# Patient Record
Sex: FEMALE | Race: WHITE | Employment: OTHER | URBAN - METROPOLITAN AREA
[De-identification: names, ages, dates, MRNs, and addresses within clinical notes are randomized per-mention and may not be internally consistent; named-entity substitution may affect disease eponyms.]

---

## 2022-11-10 ENCOUNTER — HOSPITAL ENCOUNTER (OUTPATIENT)
Dept: CT IMAGING | Age: 72
Discharge: HOME | End: 2022-11-12
Payer: COMMERCIAL

## 2022-11-10 VITALS
BODY MASS INDEX: 30.55 KG/M2 | HEIGHT: 62 IN | OXYGEN SATURATION: 97 % | RESPIRATION RATE: 16 BRPM | WEIGHT: 166 LBS | SYSTOLIC BLOOD PRESSURE: 176 MMHG | TEMPERATURE: 98.2 F | HEART RATE: 75 BPM | DIASTOLIC BLOOD PRESSURE: 76 MMHG

## 2022-11-10 DIAGNOSIS — R80.1 PERSISTENT PROTEINURIA: ICD-10-CM

## 2022-11-10 DIAGNOSIS — N17.9 ACUTE RENAL FAILURE, UNSPECIFIED ACUTE RENAL FAILURE TYPE (HCC): ICD-10-CM

## 2022-11-10 LAB
INR BLD: 1.2
PROTHROMBIN TIME: 12.9 SEC (ref 9.3–12.4)

## 2022-11-10 PROCEDURE — 2709999900 CT BIOPSY RENAL

## 2022-11-10 PROCEDURE — 2500000003 HC RX 250 WO HCPCS: Performed by: RADIOLOGY

## 2022-11-10 PROCEDURE — 6360000002 HC RX W HCPCS: Performed by: RADIOLOGY

## 2022-11-10 PROCEDURE — 2709999900 HC NON-CHARGEABLE SUPPLY

## 2022-11-10 PROCEDURE — 7100000011 HC PHASE II RECOVERY - ADDTL 15 MIN

## 2022-11-10 PROCEDURE — 85610 PROTHROMBIN TIME: CPT

## 2022-11-10 PROCEDURE — 36415 COLL VENOUS BLD VENIPUNCTURE: CPT

## 2022-11-10 PROCEDURE — 36591 DRAW BLOOD OFF VENOUS DEVICE: CPT

## 2022-11-10 PROCEDURE — 77012 CT SCAN FOR NEEDLE BIOPSY: CPT

## 2022-11-10 PROCEDURE — 7100000010 HC PHASE II RECOVERY - FIRST 15 MIN

## 2022-11-10 RX ORDER — FERROUS SULFATE 325(65) MG
325 TABLET ORAL
COMMUNITY

## 2022-11-10 RX ORDER — METOPROLOL TARTRATE 5 MG/5ML
10 INJECTION INTRAVENOUS ONCE
Status: COMPLETED | OUTPATIENT
Start: 2022-11-10 | End: 2022-11-10

## 2022-11-10 RX ORDER — ZINC GLUCONATE 50 MG
50 TABLET ORAL DAILY
COMMUNITY

## 2022-11-10 RX ORDER — MIDAZOLAM HYDROCHLORIDE 2 MG/2ML
INJECTION, SOLUTION INTRAMUSCULAR; INTRAVENOUS
Status: COMPLETED | OUTPATIENT
Start: 2022-11-10 | End: 2022-11-10

## 2022-11-10 RX ORDER — AMLODIPINE BESYLATE 5 MG/1
5 TABLET ORAL 2 TIMES DAILY
COMMUNITY

## 2022-11-10 RX ORDER — SODIUM BICARBONATE 325 MG/1
650 TABLET ORAL 2 TIMES DAILY
COMMUNITY

## 2022-11-10 RX ORDER — FENTANYL CITRATE 50 UG/ML
INJECTION, SOLUTION INTRAMUSCULAR; INTRAVENOUS
Status: COMPLETED | OUTPATIENT
Start: 2022-11-10 | End: 2022-11-10

## 2022-11-10 RX ORDER — HYDRALAZINE HYDROCHLORIDE 10 MG/1
10 TABLET, FILM COATED ORAL EVERY 12 HOURS
COMMUNITY

## 2022-11-10 RX ORDER — FLUOXETINE 10 MG/1
10 CAPSULE ORAL DAILY
COMMUNITY

## 2022-11-10 RX ORDER — LANOLIN ALCOHOL/MO/W.PET/CERES
1000 CREAM (GRAM) TOPICAL DAILY
COMMUNITY

## 2022-11-10 RX ORDER — OLANZAPINE 2.5 MG/1
2.5 TABLET ORAL NIGHTLY
COMMUNITY

## 2022-11-10 RX ORDER — PANTOPRAZOLE SODIUM 40 MG/1
40 TABLET, DELAYED RELEASE ORAL DAILY
COMMUNITY

## 2022-11-10 RX ORDER — FUROSEMIDE 20 MG/1
20 TABLET ORAL DAILY
COMMUNITY

## 2022-11-10 RX ORDER — LIDOCAINE HYDROCHLORIDE 20 MG/ML
INJECTION, SOLUTION INFILTRATION; PERINEURAL
Status: COMPLETED | OUTPATIENT
Start: 2022-11-10 | End: 2022-11-10

## 2022-11-10 RX ADMIN — MIDAZOLAM HYDROCHLORIDE 1 MG: 1 INJECTION, SOLUTION INTRAMUSCULAR; INTRAVENOUS at 11:33

## 2022-11-10 RX ADMIN — LIDOCAINE HYDROCHLORIDE 5 ML: 20 INJECTION, SOLUTION INFILTRATION; PERINEURAL at 11:40

## 2022-11-10 RX ADMIN — FENTANYL CITRATE 50 MCG: 50 INJECTION, SOLUTION INTRAMUSCULAR; INTRAVENOUS at 11:25

## 2022-11-10 RX ADMIN — FENTANYL CITRATE 50 MCG: 50 INJECTION, SOLUTION INTRAMUSCULAR; INTRAVENOUS at 11:33

## 2022-11-10 RX ADMIN — MIDAZOLAM HYDROCHLORIDE 1 MG: 1 INJECTION, SOLUTION INTRAMUSCULAR; INTRAVENOUS at 11:26

## 2022-11-10 RX ADMIN — METOPROLOL TARTRATE 10 MG: 5 INJECTION, SOLUTION INTRAVENOUS at 10:53

## 2022-11-10 ASSESSMENT — PAIN - FUNCTIONAL ASSESSMENT: PAIN_FUNCTIONAL_ASSESSMENT: NONE - DENIES PAIN

## 2022-11-10 NOTE — PROCEDURES
1226 pt returned from procedure, A&Ox4, VS returned to baseline. Pt verbalizes no complaints. Dressing clean, dry, and intact. Will continue to monitor    1241 A&Ox4, VS at baseline. Pt verbalizes no complaints. Dressing clean, dry, and intact. Supplied food/drink to pt, tolerating well.  Will continue to monitor

## 2022-11-10 NOTE — PROGRESS NOTES
Patient given discharge instructions and signed dc papers. Site was checked, still dry and intact, no s/s of bleeding noted.  Vss.

## 2023-01-13 RX ORDER — HYDRALAZINE HYDROCHLORIDE 10 MG/1
20 TABLET, FILM COATED ORAL EVERY 12 HOURS
COMMUNITY
Start: 2022-10-25

## 2023-01-13 RX ORDER — CYANOCOBALAMIN 1000 UG/ML
1000 INJECTION, SOLUTION INTRAMUSCULAR; SUBCUTANEOUS DAILY
COMMUNITY
Start: 2022-06-09 | End: 2023-02-07

## 2023-01-13 RX ORDER — FUROSEMIDE 20 MG/1
20 TABLET ORAL DAILY
COMMUNITY
Start: 2022-10-25

## 2023-01-13 RX ORDER — FERROUS SULFATE 325(65) MG
325 TABLET ORAL
COMMUNITY
Start: 2022-07-09 | End: 2023-01-23 | Stop reason: SDUPTHER

## 2023-01-13 RX ORDER — OLANZAPINE 2.5 MG/1
2.5 TABLET ORAL NIGHTLY
COMMUNITY
Start: 2022-07-07

## 2023-01-23 ENCOUNTER — OFFICE VISIT (OUTPATIENT)
Dept: VASCULAR SURGERY | Age: 73
End: 2023-01-23
Payer: COMMERCIAL

## 2023-01-23 ENCOUNTER — PREP FOR PROCEDURE (OUTPATIENT)
Dept: VASCULAR SURGERY | Age: 73
End: 2023-01-23

## 2023-01-23 DIAGNOSIS — Z99.2 ENCOUNTER REGARDING VASCULAR ACCESS FOR DIALYSIS FOR END-STAGE RENAL DISEASE (HCC): ICD-10-CM

## 2023-01-23 DIAGNOSIS — N18.6 ENCOUNTER REGARDING VASCULAR ACCESS FOR DIALYSIS FOR END-STAGE RENAL DISEASE (HCC): ICD-10-CM

## 2023-01-23 PROCEDURE — 99204 OFFICE O/P NEW MOD 45 MIN: CPT | Performed by: PHYSICIAN ASSISTANT

## 2023-01-23 PROCEDURE — 1123F ACP DISCUSS/DSCN MKR DOCD: CPT | Performed by: PHYSICIAN ASSISTANT

## 2023-01-23 RX ORDER — OMEPRAZOLE 20 MG/1
20 CAPSULE, DELAYED RELEASE ORAL DAILY
COMMUNITY
Start: 2023-01-16

## 2023-01-23 NOTE — PROGRESS NOTES
Vascular Surgery Outpatient Consultation    Reason for Consult:  Dialysis Access    PCP : Alyse Herbert MD  Nephrologist : Dr Marco Núñez:    Patient presents for evaluation for upper arm dialysis access creation. She is not currently on dialysis. She is right handed. Denies history of pacer, defibrillator, UE DVT, PICC line or other CVCs. She has chronic pruritis and is constantly scratching her arms resulting in excoriations. [] Dialysis and If so date initiated    [x] Etiology of ESRD - IgA nephropathy    [] Hx of Central  Catheters    [] Current Access    [] Previous Access Procedures    [x] Right Hand Dominant      ROS : All others Negative if blank [], Positive if [x]  General Urinary   [] Fevers [] Hematuria   [] Chills [] Dysuria   [] Weight Loss Vascular   Skin [] Claudication   [] Tissue Loss [] Rest Pain   Eyes Neurologic   [] Wears Glasses/Contacts [] Stroke/TIA   [] Vision Changes [] Focal weakness   Respiratory [] Slurred Speech    [] Shortness of breath ENT   Cardiovascular [] Difficulty swallowing   [] Chest Pain Endocrine    [] Shortness of breath with exertion [] Increased Thirst   Gastrointestinal    [] Abdominal Pain    [] Melena   [] Hematochezia         Past Medical History:        Diagnosis Date    Anemia     Kidney failure      Past Surgical History:        Procedure Laterality Date    CT BIOPSY RENAL  11/10/2022    CT BIOPSY RENAL 11/10/2022 Sami Mariea Gosselin, MD [facility]     Current Medications:   Current Outpatient Medications   Medication Sig Dispense Refill    omeprazole (PRILOSEC) 20 MG delayed release capsule Take 20 mg by mouth daily      furosemide (LASIX) 20 MG tablet Take 20 mg by mouth daily      hydrALAZINE (APRESOLINE) 10 MG tablet 20 mg  in the morning and 20 mg in the evening.       OLANZapine (ZYPREXA) 2.5 MG tablet Take 2.5 mg by mouth nightly      amLODIPine (NORVASC) 5 MG tablet Take 10 mg by mouth daily      vitamin B-12 (CYANOCOBALAMIN) 1000 MCG tablet Take 1,000 mcg by mouth daily      ferrous sulfate (IRON 325) 325 (65 Fe) MG tablet Take 325 mg by mouth three times a week      FLUoxetine (PROZAC) 10 MG capsule Take 20 mg by mouth 2 times daily      sodium bicarbonate 325 MG tablet Take 1,300 mg by mouth 2 times daily      zinc gluconate 50 MG tablet Take 50 mg by mouth daily      cyanocobalamin 1000 MCG/ML injection 1,000 mcg daily (Patient not taking: Reported on 1/23/2023)       No current facility-administered medications for this visit. Allergies:  Patient has no known allergies. Social History     Socioeconomic History    Marital status:      Spouse name: Not on file    Number of children: Not on file    Years of education: Not on file    Highest education level: Not on file   Occupational History    Not on file   Tobacco Use    Smoking status: Never    Smokeless tobacco: Never   Vaping Use    Vaping Use: Never used   Substance and Sexual Activity    Alcohol use: Yes     Comment: rare    Drug use: Never    Sexual activity: Not on file   Other Topics Concern    Not on file   Social History Narrative    Not on file     Social Determinants of Health     Financial Resource Strain: Not on file   Food Insecurity: Not on file   Transportation Needs: Not on file   Physical Activity: Not on file   Stress: Not on file   Social Connections: Not on file   Intimate Partner Violence: Not on file   Housing Stability: Not on file     No family history on file.   Labs  Lab Results   Component Value Date    PROTIME 12.9 (H) 11/10/2022    INR 1.2 11/10/2022       PHYSICAL EXAM:    CONSTITUTIONAL:   Awake, alert, cooperative  PSYCHIATRIC :  Oriented to time, place and person     Appropriate insight to disease process  EYES: Lids and lashes normal  ENT:  External ears and nose without lesions   Hearing deficits absent  NECK: Supple, symmetrical, trachea midline   Thyroid goiter not appreciated   Carotid bruit absent  LUNGS:  No increased work of breathing                 Clear to auscultation  CARDIOVASCULAR:  regular rate and rhythm   ABDOMEN:  soft, non-distended, non-tender  Lymphatics : Cervical lymphadenopathy absent     Femoral lymphadenopathy absent  SKIN:   Normal skin color   Texture and turgor normal, no induration  EXTREMITIES:   R UE 5/5 strength   No cyanosis noted in nail beds   Excoriations throughout  L UE 5/5 strength   No cyanosis noted in nail beds   Excoriations throughout  R brachial 3 L brachial 3   R radial 2 L radial 2   R ulnar  L ulnar 1   R palmar  L palmar biphasic   Arterial Dominance  Arterial Dominance codominance     RADIOLOGY: Vein mapping reviewed suggesting either a LUE BC or BB AVF    A/P ESRD requiring vascular access for hemodialysis  Based off bedside vein mapping the best option will likely be mid-forearm Left RC AVF vs brachiocephalic AVF  will schedule OR at patient's convenience  Encouraged her to stop scratching her arms so her superficial excoriations improve prior to surgery  I explained that I evaluate patient in OR with US to see if there are any other options  discussed with patient options of AV fistula vs. AV Graft  Discussed specifically the difficulties associated with dialysis access, time to use or maturation of access, potential need for additional procedures or even a completely new access if one created does not function well  Discussed risk of steal syndrome, symptoms associated with, and potential need for ligation of access if significant  Pt explained risks, benefits, complications, procedure, alternatives, options, and expected outcomes of each of the above options and was agreeable to proceed    Pt seen and plan reviewed with Dr. Kelsie Leon.      Franklin Brizuela PA-C

## 2023-02-07 RX ORDER — FLUOXETINE HYDROCHLORIDE 20 MG/1
20 CAPSULE ORAL DAILY
COMMUNITY

## 2023-02-07 RX ORDER — AMLODIPINE BESYLATE 10 MG/1
10 TABLET ORAL DAILY
COMMUNITY

## 2023-02-07 NOTE — PROGRESS NOTES
[address]   PRE-ADMISSION TESTING GENERAL INSTRUCTIONS  [facility] Phone Number: [phone]      GENERAL INSTRUCTIONS:    [x] Antibacterial Soap Shower Night before or AM of surgery. [] CHG Wipes instruction sheet and wipes given. [] Hibiclens shower the night before and the morning of surgery.   -Do not use Hibiclens on your face or head. [x] Do not wear makeup, lotions, powders, deodorant. [x] Nothing by mouth after midnight; including gum, candy, mints, or water. [x] You may brush your teeth, gargle, but do NOT swallow water. [x] No tobacco products, illegal drugs, or alcohol within 24 hours of your surgery. [x] Jewelry or valuables should not be brought to the hospital. All body and/or tongue piercing's must be removed prior to arriving to hospital. No contact lens or hair pins. [x] Arrange transportation with a responsible adult  to and from the hospital. Arrange for someone to be with you for the remainder of the day and for 24 hours after your procedure due to having had anesthesia when discharged.         -Who will be your  for transportation? Vennie Hodgkin        -Who will be staying with you for 24 hrs after your procedure? Pedro-son  [x] [employer] card and photo ID. [x] Bring copy of living will or healthcare power of  papers to be placed in your electronic record. [] Urine Pregnancy test will be preformed the day of surgery. A specimen sample may be brought from home. [] Transfusion Bracelet: Please bring with you to hospital, day of surgery. PARKING INSTRUCTIONS:     [x] ARRIVAL DATE & TIME:  2/10 at 7 am   [x] Enter into the [employer] [facility] [place]. Two people may accompany you. Masks are not required but are recommended. [x] Parking Lot \"I\" is where you will park. It is located on the corner of [address] and [address]. The entrance is on [address].    Upon entering the parking lot, a voucher ticket will print    EDUCATION INSTRUCTIONS:        [] Knee or Hip replacement booklet & exercise pamphlets given. [] [address] placed in chart. [] Pre-admission Testing educational folder given  [] Incentive Spirometry,coughing & deep breathing exercises reviewed. [] Medication information sheet(s)   [] Fluoroscopy-Xray used in surgery reviewed with patient. Educational pamphlet placed in chart. [] Pain: Post-op pain is normal and to be expected. You will be asked to rate your pain from 0-10. [] Joint camp offered. [] Joint replacement booklets given.  [] Spine Navigator to see in PAT. [] Other:___________________________    MEDICATION INSTRUCTIONS:    [x] Bring a complete list of your medications, please write the last time you took the medicine, give this list to the nurse in Pre-Op. [x] Take only the following medications the morning of surgery with 1-2 ounces of water:  Prozac; Apresoline; Norvasc; Prilosec   [x] Stop all herbal supplements and vitamins 5 days before surgery. Stop NSAIDS 7 days before surgery. [] DO NOT take any diabetic medicine the morning of surgery. Follow instructions for insulin the day before surgery. [] If you are diabetic and your blood sugar is low or you feel symptomatic, you may drink 1-2 ounces of apple juice or take a glucose tablet.            -The morning of your procedure, you may call the pre-op area if you have concerns about your blood sugar [phone]. [] Use your inhalers the morning of surgery. Bring your emergency inhaler with you day of surgery. [x] Follow physician instructions regarding any blood thinners you may be taking. WHAT TO EXPECT:    [x] The day of surgery you will be greeted and checked in by the [employer].  In addition, you will be registered in the [place] by a Patient Access Representative. Please bring your photo ID and insurance card.  A nurse will greet you in accordance to the time you are needed in the pre-op area to prepare you for surgery. Please do not be discouraged if you are not greeted in the order you arrive as there are many variables that are involved in patient preparation. Your patience is greatly appreciated as you wait for your nurse. Please bring in items such as: books, magazines, newspapers, electronics, or any other items  to occupy your time in the waiting area. [x]  Delays may occur with surgery and staff will make a sincere effort to keep you informed of delays. If any delays occur with your procedure, we apologize ahead of time for your inconvenience as we recognize the value of your time.

## 2023-02-10 ENCOUNTER — ANESTHESIA EVENT (OUTPATIENT)
Dept: OPERATING ROOM | Age: 73
End: 2023-02-10
Payer: COMMERCIAL

## 2023-02-10 ENCOUNTER — HOSPITAL ENCOUNTER (OUTPATIENT)
Age: 73
Setting detail: OUTPATIENT SURGERY
Discharge: HOME | End: 2023-02-10
Attending: SURGERY | Admitting: SURGERY
Payer: COMMERCIAL

## 2023-02-10 ENCOUNTER — ANESTHESIA (OUTPATIENT)
Dept: OPERATING ROOM | Age: 73
End: 2023-02-10
Payer: COMMERCIAL

## 2023-02-10 VITALS
SYSTOLIC BLOOD PRESSURE: 144 MMHG | DIASTOLIC BLOOD PRESSURE: 63 MMHG | OXYGEN SATURATION: 92 % | TEMPERATURE: 98 F | HEIGHT: 62 IN | RESPIRATION RATE: 18 BRPM | BODY MASS INDEX: 30.73 KG/M2 | HEART RATE: 80 BPM | WEIGHT: 167 LBS

## 2023-02-10 DIAGNOSIS — Z99.2 ENCOUNTER REGARDING VASCULAR ACCESS FOR DIALYSIS FOR END-STAGE RENAL DISEASE (HCC): Primary | ICD-10-CM

## 2023-02-10 DIAGNOSIS — N18.6 END STAGE RENAL DISEASE (HCC): ICD-10-CM

## 2023-02-10 DIAGNOSIS — N18.6 ENCOUNTER REGARDING VASCULAR ACCESS FOR DIALYSIS FOR END-STAGE RENAL DISEASE (HCC): Primary | ICD-10-CM

## 2023-02-10 LAB
ABO/RH: NORMAL
ANION GAP SERPL CALCULATED.3IONS-SCNC: 14 MMOL/L (ref 7–16)
ANTIBODY SCREEN: NORMAL
BUN BLDV-MCNC: 44 MG/DL (ref 6–23)
CALCIUM SERPL-MCNC: 8.1 MG/DL (ref 8.6–10.2)
CHLORIDE BLD-SCNC: 105 MMOL/L (ref 98–107)
CO2: 23 MMOL/L (ref 22–29)
CREAT SERPL-MCNC: 4.5 MG/DL (ref 0.5–1)
GFR SERPL CREATININE-BSD FRML MDRD: 10 ML/MIN/1.73
GLUCOSE BLD-MCNC: 122 MG/DL (ref 74–99)
HCT VFR BLD CALC: 25.7 % (ref 34–48)
HEMOGLOBIN: 8.3 G/DL (ref 11.5–15.5)
INR BLD: 1.2
MCH RBC QN AUTO: 28.5 PG (ref 26–35)
MCHC RBC AUTO-ENTMCNC: 32.3 % (ref 32–34.5)
MCV RBC AUTO: 88.3 FL (ref 80–99.9)
PDW BLD-RTO: 14.1 FL (ref 11.5–15)
PLATELET # BLD: 120 E9/L (ref 130–450)
PMV BLD AUTO: 11.5 FL (ref 7–12)
POTASSIUM REFLEX MAGNESIUM: 3.9 MMOL/L (ref 3.5–5)
PROTHROMBIN TIME: 13.4 SEC (ref 9.3–12.4)
RBC # BLD: 2.91 E12/L (ref 3.5–5.5)
SODIUM BLD-SCNC: 142 MMOL/L (ref 132–146)
WBC # BLD: 5.3 E9/L (ref 4.5–11.5)

## 2023-02-10 PROCEDURE — 85027 COMPLETE CBC AUTOMATED: CPT

## 2023-02-10 PROCEDURE — 6360000002 HC RX W HCPCS: Performed by: NURSE ANESTHETIST, CERTIFIED REGISTERED

## 2023-02-10 PROCEDURE — 2580000003 HC RX 258: Performed by: SURGERY

## 2023-02-10 PROCEDURE — 3600000012 HC SURGERY LEVEL 2 ADDTL 15MIN: Performed by: SURGERY

## 2023-02-10 PROCEDURE — 86900 BLOOD TYPING SEROLOGIC ABO: CPT

## 2023-02-10 PROCEDURE — 6370000000 HC RX 637 (ALT 250 FOR IP): Performed by: SURGERY

## 2023-02-10 PROCEDURE — 86901 BLOOD TYPING SEROLOGIC RH(D): CPT

## 2023-02-10 PROCEDURE — 6360000002 HC RX W HCPCS: Performed by: ANESTHESIOLOGY

## 2023-02-10 PROCEDURE — 36415 COLL VENOUS BLD VENIPUNCTURE: CPT

## 2023-02-10 PROCEDURE — 6360000002 HC RX W HCPCS: Performed by: SURGERY

## 2023-02-10 PROCEDURE — 2500000003 HC RX 250 WO HCPCS: Performed by: SURGERY

## 2023-02-10 PROCEDURE — 7100000011 HC PHASE II RECOVERY - ADDTL 15 MIN: Performed by: SURGERY

## 2023-02-10 PROCEDURE — 6360000002 HC RX W HCPCS

## 2023-02-10 PROCEDURE — 2709999900 HC NON-CHARGEABLE SUPPLY: Performed by: SURGERY

## 2023-02-10 PROCEDURE — 3700000000 HC ANESTHESIA ATTENDED CARE: Performed by: SURGERY

## 2023-02-10 PROCEDURE — 3600000002 HC SURGERY LEVEL 2 BASE: Performed by: SURGERY

## 2023-02-10 PROCEDURE — 3700000001 HC ADD 15 MINUTES (ANESTHESIA): Performed by: SURGERY

## 2023-02-10 PROCEDURE — 64415 NJX AA&/STRD BRCH PLXS IMG: CPT | Performed by: ANESTHESIOLOGY

## 2023-02-10 PROCEDURE — 7100000010 HC PHASE II RECOVERY - FIRST 15 MIN: Performed by: SURGERY

## 2023-02-10 PROCEDURE — 36821 AV FUSION DIRECT ANY SITE: CPT | Performed by: SURGERY

## 2023-02-10 PROCEDURE — 86850 RBC ANTIBODY SCREEN: CPT

## 2023-02-10 PROCEDURE — A4217 STERILE WATER/SALINE, 500 ML: HCPCS | Performed by: SURGERY

## 2023-02-10 PROCEDURE — 2580000003 HC RX 258

## 2023-02-10 PROCEDURE — 85610 PROTHROMBIN TIME: CPT

## 2023-02-10 PROCEDURE — 80048 BASIC METABOLIC PNL TOTAL CA: CPT

## 2023-02-10 RX ORDER — MIDAZOLAM HYDROCHLORIDE 2 MG/2ML
1 INJECTION, SOLUTION INTRAMUSCULAR; INTRAVENOUS PRN
Status: DISCONTINUED | OUTPATIENT
Start: 2023-02-10 | End: 2023-02-10 | Stop reason: HOSPADM

## 2023-02-10 RX ORDER — HEPARIN SODIUM 1000 [USP'U]/ML
INJECTION, SOLUTION INTRAVENOUS; SUBCUTANEOUS PRN
Status: DISCONTINUED | OUTPATIENT
Start: 2023-02-10 | End: 2023-02-10 | Stop reason: SDUPTHER

## 2023-02-10 RX ORDER — OXYCODONE HYDROCHLORIDE AND ACETAMINOPHEN 5; 325 MG/1; MG/1
1 TABLET ORAL EVERY 6 HOURS PRN
Qty: 28 TABLET | Refills: 0 | Status: SHIPPED | OUTPATIENT
Start: 2023-02-10 | End: 2023-02-17

## 2023-02-10 RX ORDER — OXYCODONE HYDROCHLORIDE AND ACETAMINOPHEN 5; 325 MG/1; MG/1
1 TABLET ORAL EVERY 4 HOURS PRN
Status: DISCONTINUED | OUTPATIENT
Start: 2023-02-10 | End: 2023-02-10 | Stop reason: HOSPADM

## 2023-02-10 RX ORDER — SODIUM CHLORIDE 0.9 % (FLUSH) 0.9 %
5-40 SYRINGE (ML) INJECTION EVERY 12 HOURS SCHEDULED
Status: DISCONTINUED | OUTPATIENT
Start: 2023-02-10 | End: 2023-02-10 | Stop reason: HOSPADM

## 2023-02-10 RX ORDER — SODIUM CHLORIDE 9 MG/ML
INJECTION, SOLUTION INTRAVENOUS PRN
Status: DISCONTINUED | OUTPATIENT
Start: 2023-02-10 | End: 2023-02-10 | Stop reason: HOSPADM

## 2023-02-10 RX ORDER — PROTAMINE SULFATE 10 MG/ML
INJECTION, SOLUTION INTRAVENOUS PRN
Status: DISCONTINUED | OUTPATIENT
Start: 2023-02-10 | End: 2023-02-10 | Stop reason: SDUPTHER

## 2023-02-10 RX ORDER — SODIUM CHLORIDE 9 MG/ML
INJECTION, SOLUTION INTRAVENOUS CONTINUOUS
Status: DISCONTINUED | OUTPATIENT
Start: 2023-02-10 | End: 2023-02-10 | Stop reason: HOSPADM

## 2023-02-10 RX ORDER — PROPOFOL 10 MG/ML
INJECTION, EMULSION INTRAVENOUS CONTINUOUS PRN
Status: DISCONTINUED | OUTPATIENT
Start: 2023-02-10 | End: 2023-02-10 | Stop reason: SDUPTHER

## 2023-02-10 RX ORDER — SODIUM CHLORIDE 0.9 % (FLUSH) 0.9 %
5-40 SYRINGE (ML) INJECTION PRN
Status: DISCONTINUED | OUTPATIENT
Start: 2023-02-10 | End: 2023-02-10 | Stop reason: HOSPADM

## 2023-02-10 RX ORDER — ROPIVACAINE HYDROCHLORIDE 5 MG/ML
30 INJECTION, SOLUTION EPIDURAL; INFILTRATION; PERINEURAL ONCE
Status: COMPLETED | OUTPATIENT
Start: 2023-02-10 | End: 2023-02-10

## 2023-02-10 RX ORDER — DEXAMETHASONE SODIUM PHOSPHATE 10 MG/ML
4 INJECTION, SOLUTION INTRAMUSCULAR; INTRAVENOUS ONCE
Status: COMPLETED | OUTPATIENT
Start: 2023-02-10 | End: 2023-02-10

## 2023-02-10 RX ORDER — ROPIVACAINE HYDROCHLORIDE 5 MG/ML
INJECTION, SOLUTION EPIDURAL; INFILTRATION; PERINEURAL
Status: COMPLETED | OUTPATIENT
Start: 2023-02-10 | End: 2023-02-10

## 2023-02-10 RX ADMIN — DEXAMETHASONE SODIUM PHOSPHATE 4 MG: 10 INJECTION INTRAMUSCULAR; INTRAVENOUS at 08:23

## 2023-02-10 RX ADMIN — PROPOFOL 50 MCG/KG/MIN: 10 INJECTION, EMULSION INTRAVENOUS at 09:43

## 2023-02-10 RX ADMIN — CEFAZOLIN 2000 MG: 2 INJECTION, POWDER, FOR SOLUTION INTRAMUSCULAR; INTRAVENOUS at 09:43

## 2023-02-10 RX ADMIN — HEPARIN SODIUM 7000 UNITS: 1000 INJECTION INTRAVENOUS; SUBCUTANEOUS at 10:04

## 2023-02-10 RX ADMIN — SODIUM CHLORIDE: 9 INJECTION, SOLUTION INTRAVENOUS at 09:38

## 2023-02-10 RX ADMIN — ROPIVACAINE HYDROCHLORIDE 30 ML: 5 INJECTION EPIDURAL; INFILTRATION; PERINEURAL at 08:30

## 2023-02-10 RX ADMIN — ROPIVACAINE HYDROCHLORIDE 30 ML: 5 INJECTION EPIDURAL; INFILTRATION; PERINEURAL at 08:23

## 2023-02-10 RX ADMIN — MIDAZOLAM 2 MG: 1 INJECTION INTRAMUSCULAR; INTRAVENOUS at 08:22

## 2023-02-10 RX ADMIN — PROTAMINE SULFATE 20 MG: 10 INJECTION, SOLUTION INTRAVENOUS at 10:47

## 2023-02-10 RX ADMIN — PROTAMINE SULFATE 25 MG: 10 INJECTION, SOLUTION INTRAVENOUS at 10:29

## 2023-02-10 ASSESSMENT — PAIN - FUNCTIONAL ASSESSMENT: PAIN_FUNCTIONAL_ASSESSMENT: 0-10

## 2023-02-10 NOTE — DISCHARGE INSTRUCTIONS
Elevate Left upper extremity at all times  Use left arm sling for 24 hours or until feeling and mobility returns in the arm. Then throw sling away. [place] may be drowsy or lightheaded after receiving sedation or anesthesia. A responsible person should be with you for the next 24 hours. Please follow the instructions checked below:    DIET INSTRUCTIONS:  [x]Start with light diet and progress to your normal diet as you feel like eating. If you experience nausea or repeated episodes of vomiting which persist beyond 12-24 hours, notify your doctor. []Other     ACTIVITY INSTRUCTIONS:  [x]Rest today. Increase activity as tolerated    [x]Elevate operative limb   [x]No heavy lifting or strenuous activity     [x]No driving for 2 days  []Other     WOUND/DRESSING INSTRUCTIONS:  Always ensure you and your care giver clean hands before and after caring for the wound. []May shower      [x]May bathe      [x]Derma bond dressing-Do not apply lotion, gel, or liquid to wound while the derma bond is in place. []Other         MEDICATION INSTRUCTIONS:  [x]Prescriptions sent with you. Use as directed. When taking pain medications, you may experience dizziness or drowsiness. Do not drink alcohol or drive when taking these medications. [x]You may take a non-prescription headache remedy, preferably one that does not contain aspirin. FOLLOW-UP CARE:  [x]Call the office at [phone] for follow-up appointment in 2 weeks    Call physician if they or any other problems occur:  Fever over 101°    Redness, swelling, hardness or warmth at the operative site  Swelling of arm  Unrelieved nausea    Foul smelling or cloudy drainage at the operative site   Unrelieved pain    Blood soaked dressing.  (Some oozing may be normal)  Pain in Hand  Blue or black fingers

## 2023-02-10 NOTE — ANESTHESIA POSTPROCEDURE EVALUATION
Department of Anesthesiology  Postprocedure Note    Patient: Phillip Nichole  MRN: [id number]  YOB: 1950  Date of evaluation: 2/10/2023      Procedure Summary     Date: 02/10/23 Room / Location: [facility] OR 04 / [facility]    Anesthesia Start: 8606 Anesthesia Stop: 1110    Procedure: CREATION ARTERIOVENOUS FISTULA LEFT ARM (Left: Arm Lower) Diagnosis:       End stage renal disease ([address]      (End stage renal disease ([address] [N18.6])    Surgeons: Worth Opitz, MD Responsible Provider: Delfin Palma DO    Anesthesia Type: MAC, regional ASA Status: 3          Anesthesia Type: No value filed.     Jt Phase I:      Jt Phase II: Jt Score: 10      Anesthesia Post Evaluation    Patient location during evaluation: bedside  Patient participation: complete - patient cannot participate  Level of consciousness: awake and alert  Airway patency: patent  Nausea & Vomiting: no nausea and no vomiting  Complications: no  Cardiovascular status: blood pressure returned to baseline  Respiratory status: acceptable  Hydration status: euvolemic  Multimodal analgesia pain management approach

## 2023-02-10 NOTE — H&P
Vascular Surgery History & Physical Exam      Chief Complaint:  ESRD    HISTORY OF PRESENT ILLNESS:                The patient is a 74 y.o. female who presents to the hospital for elective creation of a arteriovenous fistula, possible graft. The patient denies any problems since the last office visit. IMPRESSION:   ESRD    PLAN:  Creation of a left upper extremity arteriovenous fistula, possible graft    I reviewed the procedure with the patient and family as available. I discussed the procedure, risks, benefits, complications, and alternatives of the procedure. They understand and consent.   All questions were answered    ROS : All others Negative if blank [], Positive if [x]  General   [] Fevers   [] Chills   [] Weight Loss   Skin   [] Tissue Loss   Eyes   [] Wears Glasses/Contacts   [] Vision Changes   Respiratory    [] Shortness of breath   Cardiovascular   [] Chest Pain   [] Shortness of breath with exertion   Gastrointestinal   [] Abdominal Pain     Past Medical History:   Diagnosis Date    Anemia     iron deficiency    Hypertension     Kidney failure      Past Surgical History:   Procedure Laterality Date    CT BIOPSY RENAL  11/10/2022    CT BIOPSY RENAL 11/10/2022 Juan Her MD [facility]     Current Medications:     Current Facility-Administered Medications:     0.9 % sodium chloride infusion, , IntraVENous, Continuous, Carlos Hines APRN - CNP    sodium chloride flush 0.9 % injection 5-40 mL, 5-40 mL, IntraVENous, 2 times per day, QUINTEN Soto - CNP    sodium chloride flush 0.9 % injection 5-40 mL, 5-40 mL, IntraVENous, PRN, Carlos Hines APRN - CNP    0.9 % sodium chloride infusion, , IntraVENous, PRN, Carlos Hines APRN - CNP    ceFAZolin (ANCEF) 2,000 mg in sterile water 20 mL IV syringe, 2,000 mg, IntraVENous, On Call to OR, QUINTEN Arguelles - CNP    midazolam PF (VERSED) injection 1 mg, 1 mg, IntraVENous, PRN, Dior Barton DO, 2 mg at 02/10/23 9475  Allergies:  Patient has no known allergies. Social History     Socioeconomic History    Marital status:      Spouse name: Not on file    Number of children: Not on file    Years of education: Not on file    Highest education level: Not on file   Occupational History    Not on file   Tobacco Use    Smoking status: Never    Smokeless tobacco: Never   Vaping Use    Vaping Use: Never used   Substance and Sexual Activity    Alcohol use: Yes     Comment: rare    Drug use: Never    Sexual activity: Not on file   Other Topics Concern    Not on file   Social History Narrative    Not on file     Social Determinants of Health     Financial Resource Strain: Not on file   Food Insecurity: Not on file   Transportation Needs: Not on file   Physical Activity: Not on file   Stress: Not on file   Social Connections: Not on file   Intimate Partner Violence: Not on file   Housing Stability: Not on file     History reviewed. No pertinent family history. REVIEW OF SYSTEMS:  The chart was reviewed.     PHYSICAL EXAM:    Vitals:    02/10/23 0715   BP: 127/75   Pulse: 87   Resp: 21   Temp: 97.7 °F (36.5 °C)   SpO2: 97%     CONSTITUTIONAL:  awake, alert, cooperative, no apparent distress, and appears stated age  NECK:  supple, symmetrical, trachea midline  LUNGS:  no increased work of breathing, good resp excursion  CARDIOVASCULAR:  S1S2   ABDOMEN:  soft, non-distended and non-tender  left upper extremity   Brachial 2+ Radial 1+    LABS:    Lab Results   Component Value Date    WBC 5.3 02/10/2023    HGB 8.3 (L) 02/10/2023    HCT 25.7 (L) 02/10/2023     (L) 02/10/2023    PROTIME 13.4 (H) 02/10/2023    INR 1.2 02/10/2023    K 3.9 02/10/2023    BUN 44 (H) 02/10/2023    CREATININE 4.5 (H) 02/10/2023       Yohana Jeffers MD

## 2023-02-10 NOTE — ANESTHESIA PRE PROCEDURE
Department of Anesthesiology  Preprocedure Note       Name:  Luiza Hodge   Age:  74 y.o.  :  1950                                          MRN:  [id number]         Date:  2/10/2023      Surgeon: Leland Pham):  Ld Officer, MD    Procedure: Procedure(s):  Creation AVF left arm, possible AVG    Medications prior to admission:   Prior to Admission medications    Medication Sig Start Date End Date Taking? Authorizing Provider   FLUoxetine (PROZAC) 20 MG capsule Take 20 mg by mouth daily   Yes Historical Provider, MD   amLODIPine (NORVASC) 10 MG tablet Take 10 mg by mouth daily   Yes Historical Provider, MD   omeprazole (PRILOSEC) 20 MG delayed release capsule Take 20 mg by mouth daily 23   Historical Provider, MD   furosemide (LASIX) 20 MG tablet Take 20 mg by mouth daily 10/25/22   Historical Provider, MD   hydrALAZINE (APRESOLINE) 10 MG tablet 20 mg  in the morning and 20 mg in the evening.  10/25/22   Historical Provider, MD   OLANZapine (ZYPREXA) 2.5 MG tablet Take 2.5 mg by mouth nightly 22   Historical Provider, MD   vitamin B-12 (CYANOCOBALAMIN) 1000 MCG tablet Take 1,000 mcg by mouth daily    Historical Provider, MD   ferrous sulfate (IRON 325) 325 (65 Fe) MG tablet Take 325 mg by mouth three times a week [facility]    Historical Provider, MD   sodium bicarbonate 325 MG tablet Take 1,300 mg by mouth 2 times daily    Historical Provider, MD   zinc gluconate 50 MG tablet Take 50 mg by mouth daily    Historical Provider, MD       Current medications:    Current Facility-Administered Medications   Medication Dose Route Frequency Provider Last Rate Last Admin    0.9 % sodium chloride infusion   IntraVENous Continuous QUINTEN Soto CNP        sodium chloride flush 0.9 % injection 5-40 mL  5-40 mL IntraVENous 2 times per day QUNITEN Chung CNP        sodium chloride flush 0.9 % injection 5-40 mL  5-40 mL IntraVENous PRN QUINTEN Soto CNP        0.9 % sodium chloride infusion   IntraVENous PRN QUINTEN Carr - CNP        ceFAZolin (ANCEF) 2,000 mg in sterile water 20 mL IV syringe  2,000 mg IntraVENous On Call to [facility], APRN - CNP        midazolam PF (VERSED) injection 1 mg  1 mg IntraVENous PRN Massimo Juan DO   2 mg at 02/10/23 8189       Allergies:  No Known Allergies    Problem List:    Patient Active Problem List   Diagnosis Code    End stage renal disease ([facility] N18.6    Encounter regarding vascular access for dialysis for end-stage renal disease ([facility] N18.6, Z99.2       Past Medical History:        Diagnosis Date    Anemia     iron deficiency    Hypertension     Kidney failure        Past Surgical History:        Procedure Laterality Date    CT BIOPSY RENAL  11/10/2022    CT BIOPSY RENAL 11/10/2022 Juan Márquez MD [facility]       Social History:    Social History     Tobacco Use    Smoking status: Never    Smokeless tobacco: Never   Substance Use Topics    Alcohol use: Yes     Comment: rare                                Counseling given: Not Answered      Vital Signs (Current):   Vitals:    02/07/23 1223 02/10/23 0715   BP:  127/75   Pulse:  87   Resp:  21   Temp:  97.7 °F (36.5 °C)   TempSrc:  Temporal   SpO2:  97%   Weight: 167 lb (75.8 kg) 167 lb (75.8 kg)   Height: 5' 2\" (1.575 m) 5' 2\" (1.575 m)                                              BP Readings from Last 3 Encounters:   02/10/23 127/75   11/10/22 (!) 176/76       NPO Status: Time of last liquid consumption: 2300                        Time of last solid consumption: 2300                        Date of last liquid consumption: 02/09/23                        Date of last solid food consumption: 02/09/23    BMI:   Wt Readings from Last 3 Encounters:   02/10/23 167 lb (75.8 kg)   11/10/22 166 lb (75.3 kg)     Body mass index is 30.54 kg/m².     CBC:   Lab Results   Component Value Date/Time    WBC 5.3 02/10/2023 07:40 AM    RBC 2.91 02/10/2023 07:40 AM    HGB 8.3 02/10/2023 07:40 AM    HCT 25.7 02/10/2023 07:40 AM    MCV 88.3 02/10/2023 07:40 AM    RDW 14.1 02/10/2023 07:40 AM     02/10/2023 07:40 AM       CMP:   Lab Results   Component Value Date/Time     02/10/2023 07:40 AM    K 3.9 02/10/2023 07:40 AM     02/10/2023 07:40 AM    CO2 23 02/10/2023 07:40 AM    BUN 44 02/10/2023 07:40 AM    CREATININE 4.5 02/10/2023 07:40 AM    LABGLOM 10 02/10/2023 07:40 AM    GLUCOSE 122 02/10/2023 07:40 AM    CALCIUM 8.1 02/10/2023 07:40 AM       POC Tests: No results for input(s): POCGLU, POCNA, POCK, POCCL, POCBUN, POCHEMO, POCHCT in the last 72 hours. Coags:   Lab Results   Component Value Date/Time    PROTIME 13.4 02/10/2023 07:40 AM    INR 1.2 02/10/2023 07:40 AM       HCG (If Applicable): No results found for: PREGTESTUR, PREGSERUM, HCG, HCGQUANT     ABGs: No results found for: PHART, PO2ART, UPM6CFB, QFR1OAL, BEART, J4PYTXLS     Type & Screen (If Applicable):  No results found for: LABABO, LABRH    Drug/Infectious Status (If Applicable):  No results found for: HIV, HEPCAB    COVID-19 Screening (If Applicable): No results found for: COVID19        Anesthesia Evaluation  Patient summary reviewed and Nursing notes reviewed no history of anesthetic complications:   Airway: Mallampati: II  TM distance: >3 FB   Neck ROM: full  Mouth opening: > = 3 FB   Dental:      Comment: None loose    Pulmonary:Negative Pulmonary ROS and normal exam  breath sounds clear to auscultation                             Cardiovascular:  Exercise tolerance: poor (<4 METS),   (+) hypertension:,         Rhythm: regular  Rate: normal                    Neuro/Psych:   Negative Neuro/Psych ROS              GI/Hepatic/Renal:   (+) renal disease: CRI,           Endo/Other:                     Abdominal:             Vascular: Other Findings:           Anesthesia Plan      MAC and regional     ASA 3       Induction: intravenous.     MIPS: Prophylactic antiemetics administered. Anesthetic plan and risks discussed with patient. Plan discussed with CRNA.           Post-op pain plan if not by surgeon: minesh Arteaga DO   2/10/2023

## 2023-02-10 NOTE — ANESTHESIA PROCEDURE NOTES
Peripheral Block    Patient location during procedure: pre-op  Reason for block: post-op pain management and at surgeon's request  Start time: 2/10/2023 8:30 AM  End time: 2/10/2023 8:32 AM  Staffing  Performed: anesthesiologist   Anesthesiologist: Miguel Parra DO  Preanesthetic Checklist  Completed: patient identified, IV checked, site marked, risks and benefits discussed, surgical/procedural consents, equipment checked, pre-op evaluation, timeout performed, anesthesia consent given, oxygen available and monitors applied/VS acknowledged  Peripheral Block   Patient position: sitting  Prep: ChloraPrep  Provider prep: mask and sterile gloves  Patient monitoring: cardiac monitor, continuous pulse ox, frequent blood pressure checks and IV access  Block type: Brachial plexus  Supraclavicular  Laterality: left  Injection technique: single-shot  Guidance: ultrasound guided  Local infiltration: lidocaine  Infiltration strength: 1 %  Local infiltration: lidocaine  Dose: 3 mL    Needle   Needle gauge: 21 G  Needle localization: ultrasound guidance  Needle length: 10 cm  Assessment   Injection assessment: negative aspiration for heme, no paresthesia on injection and local visualized surrounding nerve on ultrasound  Paresthesia pain: none  Slow fractionated injection: yes  Hemodynamics: stable  Real-time US image taken/store: yes  Outcomes: uncomplicated and patient tolerated procedure well    Additional Notes  U/S [id number].   Timeout performed          Medications Administered  dexamethasone 4 MG/ML - Perineural   4 mg - 2/10/2023 8:30:00 AM  ropivacaine (NAROPIN) injection 0.5% - Perineural   30 mL - 2/10/2023 8:30:00 AM

## 2023-02-10 NOTE — OP NOTE
Operative Note      Patient: Roberto Eng  YOB: 1950  MRN: [id number]    Date of Procedure: 2/10/2023    Pre-Op Diagnosis: End stage renal disease ([facility] [N18.6]    Post-Op Diagnosis: same       Procedure   L BC AVF    Surgeon(s):  Pradeep Seth MD    Assistant:   Surgical Assistant: QUINTEN Dorsey - CNP    Anesthesia: Regional, [facility]    Estimated Blood Loss (mL): less than 50     Complications: None    DESCRIPTION OF PROCEDURE: The patient was identified and the procedure was confirmed. The left arm was prepped and draped in the usual sterile fashion. Lidocaine 1% mixed with 0.25% Marcaine was used for local anesthesia. A transverse skin incision was made in the antecubital fossa and carried down through the subcutaneous tissue. The cephalic vein was identified and dissected free from the surrounding tissues. The vein appeared to be good quality for the fistula. It was marked for orientation and branches were divided between silk ties. It was ligated distally and divided. Medially, the brachial artery was identified and freed from the surrounding tissues. It was surrounded proximally and distally with vessel loops for control. The patient was heparinized and the artery was clamped proximally and distally. A longitudinal arteriotomy measuring 5 mm was made. The vein was cut to the appropriate length and spatulated and anastomosed to the side of the artery using a running 6-0 Prolene suture. After completing the anastomosis, the clamps were released and a thrill was appreciated through the fistula. A radial pulse, ulnar, and palmar biphasic was appreciated in the wrist. Hemostasis was obtained and the incisions were irrigated with saline solution. The incision was approximated with Vicryl sutures and Dermabond was applied to the incisions in the operating room. Needle, sponge and instrument counts were reported as correct x2.  The patient tolerated the procedure and was transferred to the recovery area in satisfactory condition. Nereida Contreras CNP was scrubbed and participated in the entire procedure including incision, exposure, anastomosis, and closure. There was no qualified general surgery resident available.      Celso Wright MD    PCP : Martin Maria MD  Nephrologist : Dr Pilo Mayo  CKD IV    Previous Vascular Procedures  2/10/23 [facility] AVF               Electronically signed by Celso Wright MD on 2/10/2023 at 11:17 AM

## 2023-02-10 NOTE — PROGRESS NOTES
CLINICAL PHARMACY NOTE: MEDS TO BEDS    Total # of Prescriptions Filled: 1   The following medications were delivered to the patient:  Oxycodone-acetaminophen 5-325    Additional Documentation:

## 2023-02-24 ENCOUNTER — TELEPHONE (OUTPATIENT)
Dept: VASCULAR SURGERY | Age: 73
End: 2023-02-24

## 2023-02-27 ENCOUNTER — OFFICE VISIT (OUTPATIENT)
Dept: VASCULAR SURGERY | Age: 73
End: 2023-02-27

## 2023-02-27 ENCOUNTER — PREP FOR PROCEDURE (OUTPATIENT)
Dept: VASCULAR SURGERY | Age: 73
End: 2023-02-27

## 2023-02-27 VITALS — BODY MASS INDEX: 32.76 KG/M2 | HEIGHT: 62 IN | WEIGHT: 178 LBS

## 2023-02-27 DIAGNOSIS — N18.6 ENCOUNTER REGARDING VASCULAR ACCESS FOR DIALYSIS FOR END-STAGE RENAL DISEASE (HCC): Primary | ICD-10-CM

## 2023-02-27 DIAGNOSIS — Z99.2 ENCOUNTER REGARDING VASCULAR ACCESS FOR DIALYSIS FOR END-STAGE RENAL DISEASE (HCC): Primary | ICD-10-CM

## 2023-02-27 NOTE — PROGRESS NOTES
3/13/2023    Gerard Overton  1950    PCP :  Nephrologist :   Dialysis Center :     Previous Vascular Access Procedu   PCP : Alem Hsu MD  Nephrologist : Dr Silverio Bates  CKD IV     Previous Vascular Procedures  2/10/23 L BC AVF           Patient returns for post operative evaluation. The patient denies any unexpected problems since the procedure. The wound is healing well. Denies left hand pain. PE  Gen NAD Awake and Alert  left Upper Extremity  Fistula is pulsatile proximally  It measures as big as 7-8 mm proximally  Large vein branch is present  Distally it is about 5 mm   2+Brachial  Radial1+ Ulnar biphasic Palmar biphasic  Wound is clean, dry and intact  with no evidence of cellulitis or drainage  No deficits in sensation or motor     A/P Dialysis Access  left BV AV Fistula  wound is healing well  no evidence of  steal syndrome  av access is not ready for use  Suspect scarring vs. Valve causing fistula diffiuclty with development  Will need branch ligation and opening up scarred area to address with likely vein patch  I reviewed with the patient that normal activities can be resumed as tolerated  I reviewed the procedure with the patient and family as available. I discussed the procedure, risks, benefits, complications, and alternatives of the procedure. They understand and consent.   All questions were answered    Jose Painter MD

## 2023-03-08 ENCOUNTER — APPOINTMENT (OUTPATIENT)
Dept: ULTRASOUND IMAGING | Age: 73
End: 2023-03-08
Attending: FAMILY MEDICINE
Payer: MEDICARE

## 2023-03-08 ENCOUNTER — HOSPITAL ENCOUNTER (INPATIENT)
Age: 73
LOS: 9 days | Discharge: NURSING, REHAB OR LONG-TERM CARE | End: 2023-03-17
Attending: FAMILY MEDICINE | Admitting: INTERNAL MEDICINE
Payer: MEDICARE

## 2023-03-08 PROBLEM — N17.9 ACUTE RENAL FAILURE (ARF) (HCC): Status: ACTIVE | Noted: 2023-03-08

## 2023-03-08 LAB
ALBUMIN SERPL-MCNC: 2.9 G/DL (ref 3.5–5.2)
ALP BLD-CCNC: 137 U/L (ref 35–104)
ALT SERPL-CCNC: 7 U/L (ref 0–32)
ANION GAP SERPL CALCULATED.3IONS-SCNC: 14 MMOL/L (ref 7–16)
AST SERPL-CCNC: 22 U/L (ref 0–31)
BACTERIA: ABNORMAL /HPF
BILIRUB SERPL-MCNC: 0.5 MG/DL (ref 0–1.2)
BILIRUBIN URINE: NEGATIVE
BLOOD, URINE: ABNORMAL
BUN BLDV-MCNC: 49 MG/DL (ref 6–23)
CALCIUM SERPL-MCNC: 8 MG/DL (ref 8.6–10.2)
CHLORIDE BLD-SCNC: 106 MMOL/L (ref 98–107)
CHLORIDE URINE RANDOM: <20 MMOL/L
CLARITY: CLEAR
CO2: 24 MMOL/L (ref 22–29)
COLOR: YELLOW
CREAT SERPL-MCNC: 5.7 MG/DL (ref 0.5–1)
CREATININE URINE: 130 MG/DL (ref 29–226)
CREATININE URINE: 131 MG/DL (ref 29–226)
GFR SERPL CREATININE-BSD FRML MDRD: 7 ML/MIN/1.73
GLUCOSE BLD-MCNC: 112 MG/DL (ref 74–99)
GLUCOSE URINE: NEGATIVE MG/DL
HCT VFR BLD CALC: 28.4 % (ref 34–48)
HEMOGLOBIN: 8.3 G/DL (ref 11.5–15.5)
KETONES, URINE: NEGATIVE MG/DL
LEUKOCYTE ESTERASE, URINE: NEGATIVE
LV EF: 65 %
LVEF MODALITY: NORMAL
MAGNESIUM: 2.5 MG/DL (ref 1.6–2.6)
MCH RBC QN AUTO: 28.4 PG (ref 26–35)
MCHC RBC AUTO-ENTMCNC: 29.2 % (ref 32–34.5)
MCV RBC AUTO: 97.3 FL (ref 80–99.9)
MICROALBUMIN UR-MCNC: 670.1 MG/L
MICROALBUMIN/CREAT UR-RTO: 511.5 (ref 0–30)
NITRITE, URINE: NEGATIVE
PDW BLD-RTO: 15 FL (ref 11.5–15)
PH UA: 6 (ref 5–9)
PHOSPHORUS: 4.5 MG/DL (ref 2.5–4.5)
PLATELET # BLD: 115 E9/L (ref 130–450)
PMV BLD AUTO: 11.9 FL (ref 7–12)
POTASSIUM SERPL-SCNC: 3.7 MMOL/L (ref 3.5–5)
POTASSIUM, UR: 22.5 MMOL/L
PROTEIN UA: 100 MG/DL
RBC # BLD: 2.92 E12/L (ref 3.5–5.5)
RBC UA: ABNORMAL /HPF (ref 0–2)
SODIUM BLD-SCNC: 144 MMOL/L (ref 132–146)
SODIUM URINE: 50 MMOL/L
SPECIFIC GRAVITY UA: 1.01 (ref 1–1.03)
TOTAL PROTEIN: 6.2 G/DL (ref 6.4–8.3)
UREA NITROGEN, UR: 477 MG/DL (ref 800–1666)
UROBILINOGEN, URINE: 0.2 E.U./DL
VITAMIN D 25-HYDROXY: <6 NG/ML (ref 30–100)
WBC # BLD: 7.1 E9/L (ref 4.5–11.5)
WBC UA: ABNORMAL /HPF (ref 0–5)

## 2023-03-08 PROCEDURE — 82570 ASSAY OF URINE CREATININE: CPT

## 2023-03-08 PROCEDURE — 80053 COMPREHEN METABOLIC PANEL: CPT

## 2023-03-08 PROCEDURE — 83735 ASSAY OF MAGNESIUM: CPT

## 2023-03-08 PROCEDURE — 84540 ASSAY OF URINE/UREA-N: CPT

## 2023-03-08 PROCEDURE — 76770 US EXAM ABDO BACK WALL COMP: CPT

## 2023-03-08 PROCEDURE — 36415 COLL VENOUS BLD VENIPUNCTURE: CPT

## 2023-03-08 PROCEDURE — 84300 ASSAY OF URINE SODIUM: CPT

## 2023-03-08 PROCEDURE — 84133 ASSAY OF URINE POTASSIUM: CPT

## 2023-03-08 PROCEDURE — 2500000003 HC RX 250 WO HCPCS: Performed by: INTERNAL MEDICINE

## 2023-03-08 PROCEDURE — 6370000000 HC RX 637 (ALT 250 FOR IP): Performed by: INTERNAL MEDICINE

## 2023-03-08 PROCEDURE — 82306 VITAMIN D 25 HYDROXY: CPT

## 2023-03-08 PROCEDURE — 82436 ASSAY OF URINE CHLORIDE: CPT

## 2023-03-08 PROCEDURE — 2580000003 HC RX 258: Performed by: INTERNAL MEDICINE

## 2023-03-08 PROCEDURE — 81001 URINALYSIS AUTO W/SCOPE: CPT

## 2023-03-08 PROCEDURE — 1200000000 HC SEMI PRIVATE

## 2023-03-08 PROCEDURE — 84100 ASSAY OF PHOSPHORUS: CPT

## 2023-03-08 PROCEDURE — 82044 UR ALBUMIN SEMIQUANTITATIVE: CPT

## 2023-03-08 PROCEDURE — 93306 TTE W/DOPPLER COMPLETE: CPT

## 2023-03-08 PROCEDURE — 85027 COMPLETE CBC AUTOMATED: CPT

## 2023-03-08 RX ORDER — ERGOCALCIFEROL 1.25 MG/1
50000 CAPSULE ORAL WEEKLY
Status: DISCONTINUED | OUTPATIENT
Start: 2023-03-08 | End: 2023-03-17 | Stop reason: HOSPADM

## 2023-03-08 RX ORDER — LANOLIN ALCOHOL/MO/W.PET/CERES
1000 CREAM (GRAM) TOPICAL DAILY
Status: DISCONTINUED | OUTPATIENT
Start: 2023-03-08 | End: 2023-03-17 | Stop reason: HOSPADM

## 2023-03-08 RX ORDER — AMLODIPINE BESYLATE 10 MG/1
20 TABLET ORAL DAILY
Status: DISCONTINUED | OUTPATIENT
Start: 2023-03-08 | End: 2023-03-16

## 2023-03-08 RX ORDER — OMEPRAZOLE 20 MG/1
20 CAPSULE, DELAYED RELEASE ORAL DAILY
Status: DISCONTINUED | OUTPATIENT
Start: 2023-03-08 | End: 2023-03-08 | Stop reason: CLARIF

## 2023-03-08 RX ORDER — FUROSEMIDE 20 MG/1
20 TABLET ORAL DAILY
Status: DISCONTINUED | OUTPATIENT
Start: 2023-03-08 | End: 2023-03-08

## 2023-03-08 RX ORDER — PANTOPRAZOLE SODIUM 40 MG/1
40 TABLET, DELAYED RELEASE ORAL
Status: DISCONTINUED | OUTPATIENT
Start: 2023-03-09 | End: 2023-03-17 | Stop reason: HOSPADM

## 2023-03-08 RX ORDER — OLANZAPINE 5 MG/1
2.5 TABLET, ORALLY DISINTEGRATING ORAL NIGHTLY
Status: DISCONTINUED | OUTPATIENT
Start: 2023-03-08 | End: 2023-03-17 | Stop reason: HOSPADM

## 2023-03-08 RX ORDER — FLUOXETINE HYDROCHLORIDE 20 MG/1
20 CAPSULE ORAL DAILY
Status: DISCONTINUED | OUTPATIENT
Start: 2023-03-08 | End: 2023-03-17 | Stop reason: HOSPADM

## 2023-03-08 RX ORDER — FERROUS SULFATE 325(65) MG
325 TABLET ORAL
Status: DISCONTINUED | OUTPATIENT
Start: 2023-03-08 | End: 2023-03-17 | Stop reason: HOSPADM

## 2023-03-08 RX ORDER — HYDRALAZINE HYDROCHLORIDE 10 MG/1
20 TABLET, FILM COATED ORAL 2 TIMES DAILY
Status: DISCONTINUED | OUTPATIENT
Start: 2023-03-08 | End: 2023-03-16

## 2023-03-08 RX ORDER — ZINC GLUCONATE 50 MG
50 TABLET ORAL DAILY
Status: DISCONTINUED | OUTPATIENT
Start: 2023-03-08 | End: 2023-03-17 | Stop reason: HOSPADM

## 2023-03-08 RX ORDER — SODIUM BICARBONATE 650 MG/1
650 TABLET ORAL 2 TIMES DAILY
Status: DISCONTINUED | OUTPATIENT
Start: 2023-03-08 | End: 2023-03-17 | Stop reason: HOSPADM

## 2023-03-08 RX ADMIN — HYDRALAZINE HYDROCHLORIDE 20 MG: 10 TABLET, FILM COATED ORAL at 22:13

## 2023-03-08 RX ADMIN — FLUOXETINE 20 MG: 20 CAPSULE ORAL at 12:03

## 2023-03-08 RX ADMIN — Medication 50 MG: at 12:03

## 2023-03-08 RX ADMIN — AMLODIPINE BESYLATE 20 MG: 10 TABLET ORAL at 12:03

## 2023-03-08 RX ADMIN — FERROUS SULFATE TAB 325 MG (65 MG ELEMENTAL FE) 325 MG: 325 (65 FE) TAB at 12:03

## 2023-03-08 RX ADMIN — CYANOCOBALAMIN TAB 1000 MCG 1000 MCG: 1000 TAB at 12:03

## 2023-03-08 RX ADMIN — SODIUM BICARBONATE 650 MG: 650 TABLET ORAL at 12:03

## 2023-03-08 RX ADMIN — OLANZAPINE 2.5 MG: 5 TABLET, ORALLY DISINTEGRATING ORAL at 22:12

## 2023-03-08 RX ADMIN — HYDRALAZINE HYDROCHLORIDE 20 MG: 10 TABLET, FILM COATED ORAL at 12:03

## 2023-03-08 RX ADMIN — BUMETANIDE 0.5 MG/HR: 0.25 INJECTION, SOLUTION INTRAMUSCULAR; INTRAVENOUS at 12:05

## 2023-03-08 RX ADMIN — SODIUM BICARBONATE 650 MG: 650 TABLET ORAL at 22:12

## 2023-03-08 ASSESSMENT — PAIN SCALES - GENERAL: PAINLEVEL_OUTOF10: 0

## 2023-03-08 NOTE — CONSULTS
Nephrology Consult  [facility]    CC:   Management of chronic kidney disease stage V not yet on dialysis    HPI:   Issa Reilly is a 77-year-old female patient we are asked to see in regards to management of her chronic kidney disease stage V not yet on dialysis. She was seen yesterday at [facility] emergency department for after a fall she sustained at home. Her son is at bedside and states patient's legs have been weak. Also concern for increased abdominal girth which patient is in need of seeing GI for. Patient was transferred here from [facility] yesterday for GI consultation and for further management of her chronic kidney disease. Is followed longitudinally in the office by Dr. Gilmar Charlton was last seen in the office on 2/21/2023. In November of last year patient has a renal biopsy that showed IgA nephropathy. She had a left upper arm AV fistula placed on 2/10/2023 by Dr. Kasey Baugh and followed up with them 2/27/2023. AV fistula with faint thrill and bruit. Per family plans were to give the fistula more time for development. On her last visit to the office her creatinine was noted to be at her recent baseline of 4.37 4.50 mg/dL. She notes increasing abdominal girth over several weeks. Lower extremity edema has continued to increase over the past week or so. Her son is at the bedside and states she has been having a decrease in her appetite over the past several weeks. She has been drinking more than usual.  She admits that she fills up fast especially with the increasing abdominal girth. She is pleasant and a fairly good historian. When she is unable to relay her son is at the bedside and is able to relate quite well.     PMH:    Past Medical History:   Diagnosis Date    Anemia     iron deficiency    Hypertension     Kidney failure        Patient Active Problem List   Diagnosis    End stage renal disease ([facility]    Encounter regarding vascular access for dialysis for end-stage renal disease (HCC)    Acute renal failure (ARF) ([facility])       Meds:     ferrous sulfate  325 mg Oral Once per day on Mon Wed Fri    amLODIPine  20 mg Oral Daily    FLUoxetine  20 mg Oral Daily    hydrALAZINE  20 mg Oral BID    OLANZapine zydis  2.5 mg Oral Nightly    sodium bicarbonate  650 mg Oral BID    vitamin B-12  1,000 mcg Oral Daily    zinc gluconate  50 mg Oral Daily    [START ON 3/9/2023] pantoprazole  40 mg Oral QAM AC        bumetanide 0.1 mg/mL infusion 0.5 mg/hr (03/08/23 1205)       Meds prn:     perflutren lipid microspheres    Meds prior to admission:     No current facility-administered medications on file prior to encounter. Current Outpatient Medications on File Prior to Encounter   Medication Sig Dispense Refill    FLUoxetine (PROZAC) 20 MG capsule Take 20 mg by mouth daily      amLODIPine (NORVASC) 10 MG tablet Take 20 mg by mouth daily      omeprazole (PRILOSEC) 20 MG delayed release capsule Take 20 mg by mouth daily      furosemide (LASIX) 20 MG tablet Take 20 mg by mouth daily      hydrALAZINE (APRESOLINE) 10 MG tablet 20 mg  in the morning and 20 mg in the evening. OLANZapine (ZYPREXA) 2.5 MG tablet Take 2.5 mg by mouth nightly      vitamin B-12 (CYANOCOBALAMIN) 1000 MCG tablet Take 1,000 mcg by mouth daily      ferrous sulfate (IRON 325) 325 (65 Fe) MG tablet Take 325 mg by mouth three times a week Otgdco-Bdfpbqhsq-Rpaakj      sodium bicarbonate 325 MG tablet Take 650 mg by mouth 2 times daily (Patient not taking: No sig reported)      zinc gluconate 50 MG tablet Take 50 mg by mouth daily         Allergies:    Patient has no known allergies. Social History:     reports that she has never smoked. She has never used smokeless tobacco. She reports current alcohol use. She reports that she does not use drugs. Family History:     No family history on file.     ROS:     All pertinent positives discussed in HPI  All other sx negative     Physical Exam:      Patient Vitals for the past 24 hrs:   BP Temp Temp src Pulse Resp SpO2 Height Weight   03/08/23 1000 134/63 98.8 °F (37.1 °C) Oral 76 17 93 % 5' 2\" (1.575 m) 190 lb 6.4 oz (86.4 kg)       No intake or output data in the 24 hours ending 03/08/23 1221    Constitutional: Patient in no acute distress   Head: normocephalic, atraumatic   Neck: supple, no jvd  Cardiovascular:  S1-S2 no S3 rub  Respiratory: Clear upper with bibasilar crackles  Gastrointestinal: Taut and distended  Ext:++ Bilateral lower extremity edema, left upper extremity with faint thrill and bruit of AV fistula  Neuro: Awake alert and oriented  Skin: She has multiple areas where she is scratching on her upper and lower extremities in various stages of healing. Data:    No results for input(s): WBC, HGB, HCT, MCV, PLT in the last 72 hours. No results for input(s): NA, K, CL, CO2, GLU, CREATININE, BUN, LABGLOM, GLUCOSE, CALCIUM, PHOS, MG in the last 72 hours. No results found for: VITD25    No results found for: PTH    No results for input(s): ALT, AST, ALKPHOS, BILITOT, BILIDIR in the last 72 hours. No results for input(s): LABALBU in the last 72 hours. No results found for: FERRITIN, IRON, TIBC    No results found for: [id number]    No results found for: FOLATE      No results found for: VOL, APPEARANCE, COLORU, LABSPEC, LABPH, LEUKBLD, NITRU, GLUCOSEU, KETUA, UROBILINOGEN, KETUA, UROBILINOGEN, BILIRUBINUR, OCBU    No results found for: RAYNE, CREURRAN, MACREATRATIO, OSMOU    No components found for: URIC    No results found for: LIPIDPAN      Assessment and Plan:    Acute kidney injury on chronic kidney disease.   Kidney injury presumed in the setting of decreased effective renal perfusion  Cannot rule out hepatorenal syndrome  We will check urinalysis urine lytes urine urea  Check urine microalbumin to creatinine ratio  We will follow labs and urine output closely  Was discussed with her and her son that she may require intervention with renal replacement therapy during this admission if we do not see improvement in her renal function. 2. Chronic kidney disease stage V not yet on dialysis-   In the setting of biopsy-proven IgA nephropathy  Creatinine at [facility] yesterday was 6.1 g/dL  Recent baseline serum creatinine 4.37 to 4.50 mg/dL  Follow renal function  Labs ordered for today    3. Volume overload-  In the setting of cirrhosis and acute renal failure  Trialing bumetanide drip at 0.5 mg/h  Check a CMP today  Patient may need SPA assist with diuresis    4. Hypertension with chronic kidney disease stage V-  Blood pressure goal less than 130/80  Currently at goal  Follow closely and avoid hypotension with plan diuresis    5. Secondary hyperparathyroidism of renal origin-  We will check PTH, vitamin D and phosphorus in the morning    6. Anemia of chronic kidney disease-  Been maintained as an outpatient on an VIKKI via the [address] iron studies in the morning, B12 and folate      QUINTEN Mills - CNP      ==========================    Renal Attending Addendum  Seen and examined   Agree with NP note above    Ms Lawyer Melissa is a 74 to F who initially presented to [facility] after a fall. There was concern for cirrhosis/ascites and decision was made to transfer for GI evaluation. Consult for MARY    She has chronic kidney disease that has been worsening  Chronic kidney disease with biopsy proven IgA nephropathy with severe changes from Nov 2022. Check urine studies as above  Check renal US     Last creatinine was 6.1 from 3/7. Follow renal function closely    She has evidence of volume overload on exam.  Give diuretics. May needs paracentesis as well    Follow closely for need to start dialysis. She has AVF in place but this is not mature. She is on sodium bicarbonate for metabolic acidosis in the setting of kidney failure and possible liver failure.   Follow CO2    Discussed with son at bedside  Thank you for the opportunity to participate in the care of Ms Mabel Lantigua MD

## 2023-03-08 NOTE — PROGRESS NOTES
Physical Therapy    Room #: 1421/1525-89  Date: 3/8/2023       Patient Name: Delfino Medina  : 1950      MRN: [id number]     Patient unavailable for physical therapy eval due to off floor at [address]. Will attempt PT evaluation at a later time. Thank you.        Loretta Johnson, PT

## 2023-03-08 NOTE — PROGRESS NOTES
Physical Therapy Initial Evaluation/Plan of Care    Room #:  0915/9615-41  Patient Name: Delfino Medina  YOB: 1950  MRN: [id number]    Date of Service: 3/9/2023     Tentative placement recommendation: Subacute Rehab  Equipment recommendation: [place]      Evaluating Physical Therapist: Loretta Johnson, PT #[id number]      Specific Provider Orders/Date/Referring Provider : 03/08/23 1115    PT eval and treat  Start:  03/08/23 1115,   End:  03/08/23 1115,   ONE TIME,   Standing Count:  1 Occurrences,   R         Lauren Starkey,       Admitting Diagnosis:   Acute renal failure (ARF) ([facility] [N17.9]     Priyanka Russ yesterday-lost her balance- legs are too weak to support her    Went to [facility]- also has \"fluid in her belly and fluid in her legs\"  Surgery: none      Patient Active Problem List   Diagnosis    End stage renal disease ([facility]    Encounter regarding vascular access for dialysis for end-stage renal disease ([facility]    Acute renal failure (ARF) ([facility]        ASSESSMENT of Current Deficits Patient exhibits decreased strength, balance, and endurance impairing functional mobility, transfers, gait , gait distance, and tolerance to activity slow danae, slight shortness of breath maintains O2 saturation. Patient requires continued skilled physical therapy to address concerns listed above for increased safety and function at discharge.          PHYSICAL THERAPY  PLAN OF CARE       Physical therapy plan of care is established based on physician order,  patient diagnosis and clinical assessment    Current Treatment Recommendations:    -Bed Mobility: Lower extremity exercises   -Sitting Balance: Incorporate reaching activities to activate trunk muscles   -Standing Balance: Perform strengthening exercises in standing to promote motor control with or without upper extremity support   -Transfers: Provide instruction on proper hand and foot position for adequate transfer of weight onto lower extremities and use of gait device if needed and Cues for hand placement, technique and safety. Provide stabilization to prevent fall   -Gait: Gait training, Standing activities to improve: base of support, weight shift, weight bearing , and Pregait training to emphasize: Sequencing , Device control, Safety, and pacing   -Endurance: Utilize Supervised activities to increase level of endurance to allow for safe functional mobility including transfers and gait  and Use graduated activities to promote good breathing techniques and provide support and education to maximize respiratory function    PT long term treatment goals are located in below grid    Patient and or family understand(s) diagnosis, prognosis, and plan of care. Frequency of treatments: Patient will be seen  daily. Prior Level of Function: Patient ambulated independently    Rehab Potential: good - for baseline    Past medical history:   Past Medical History:   Diagnosis Date    Anemia     iron deficiency    Hypertension     Kidney failure      Past Surgical History:   Procedure Laterality Date    CT BIOPSY RENAL  11/10/2022    CT BIOPSY RENAL 11/10/2022 Juan Munguia MD [facility] CT    DIALYSIS FISTULA CREATION Left 2/10/2023    CREATION ARTERIOVENOUS FISTULA LEFT ARM performed by Gabby Diaz MD at [address]:    Precautions:    , falls ,      Imaging results: No results found. Social history: Patient lives with son in a ranch home  with 3 steps, bilateral rails  to enter home.    Walk in shower  , built in shower chair     Equipment owned: Cane and Quad cane,       AM-PAC Basic Mobility        AM-PAC Basic Mobility - Inpatient   How much help is needed turning from your back to your side while in a flat bed without using bedrails?: A Little  How much help is needed moving from lying on your back to sitting on the side of a flat bed without using bedrails?: A Little  How much help is needed moving to and from a bed to a chair?: A Little  How much help is needed standing up from a chair using your arms?: A Little  How much help is needed walking in hospital room?: A Little  How much help is needed climbing 3-5 steps with a railing?: A Lot  AM-PAC Inpatient Mobility Raw Score : 17  AM-PAC Inpatient T-Scale Score : 42.13  Mobility Inpatient CMS 0-100% Score: 50.57  Mobility Inpatient CMS G-Code Modifier : CK    Nursing cleared patient for PT evaluation. The admitting diagnosis and active problem list as listed above have been reviewed prior to the initiation of this evaluation. OBJECTIVE;   Initial Evaluation  Date: 3/9/2023 Treatment Date:     Short Term/ Long Term   Goals   Was pt agreeable to Eval/treatment? Yes  To be met in 3 days   Pain level   0/10        Bed Mobility    Rolling: Not assessed patient in chair      Rolling: Independent    Supine to sit:  Independent    Sit to supine: Independent    Scooting: Independent     Transfers Sit to stand: Minimal assist of 1 Cues for hand placement and safety   Sit to stand: Supervision      Ambulation     2 x 20 feet using  wheeled walker with Minimal assist of 1   for walker control and cues for walker approximation, safety, and pacing    2 x 50 feet using  wheeled walker with Supervision     Stair negotiation: ascended and descended   Not assessed          ROM Within functional limits    Increase range of motion 10% of affected joints    Strength BUE:  refer to OT eval  RLE:  3+/5  LLE:  3+/5  Increase strength in affected mm groups by 1/3 grade   Balance Sitting EOB:  not assessed    Dynamic Standing:  fair wheeled walker   Sitting EOB:  good    Dynamic Standing: good -wheeled walker      Patient is Alert & Oriented x person, place, time, and situation and follows directions    Sensation:  Patient  denies numbness/tingling   Edema:  yes bilateral lower extremities   Endurance: fair       Vitals: room air   Blood Pressure at rest  Blood Pressure during session    Heart Rate at rest 72 Heart Rate during session 90   SPO2 at rest 97%  SPO2 during session 94%     Patient education  Patient educated on role of Physical Therapy, risks of immobility, safety and plan of care,  importance of mobility while in hospital , ankle pumps, quad set and glut set for edema control, blood clot prevention, and positioning for skin integrity and comfort     Patient response to education:   Pt verbalized understanding Pt demonstrated skill Pt requires further education in this area   Yes Partial Yes      Treatment:  Patient practiced and was instructed/facilitated in the following treatment: Patient in chair, ambulated in room returned up to chair   performed exercises. Declined 2nd rep gait due to fatigue. Therapeutic Exercises:  ankle pumps, heel raises, glut sets, long arc quad, and seated marching  x 15-20 reps. At end of session, patient in chair with     call light and phone within reach,  all lines and tubes intact, nursing notified. Patient would benefit from continued skilled Physical Therapy to improve functional independence and quality of life. Patient's/ family goals   get stronger    Time in  1035  Time out  1105    Total Treatment Time  10 minutes    Evaluation time includes thorough review of current medical information, gathering information on past medical history/social history and prior level of function, completion of standardized testing/informal observation of tasks, assessment of data, and development of Plan of care and goals.      CPT codes:  Low Complexity PT evaluation (86763)  Therapeutic activities (43929)   10 minutes  1 unit(s)    Mark Watts PT

## 2023-03-09 ENCOUNTER — APPOINTMENT (OUTPATIENT)
Dept: INTERVENTIONAL RADIOLOGY/VASCULAR | Age: 73
End: 2023-03-09
Attending: FAMILY MEDICINE
Payer: MEDICARE

## 2023-03-09 LAB
ALBUMIN FLUID: 1.2 G/DL
ALBUMIN SERPL-MCNC: 2.6 G/DL (ref 3.5–5.2)
ALP BLD-CCNC: 147 U/L (ref 35–104)
ALT SERPL-CCNC: 7 U/L (ref 0–32)
AMYLASE FLUID: 18 U/L
ANION GAP SERPL CALCULATED.3IONS-SCNC: 11 MMOL/L (ref 7–16)
APPEARANCE FLUID: CLEAR
AST SERPL-CCNC: 21 U/L (ref 0–31)
BASOPHILS ABSOLUTE: 0.02 E9/L (ref 0–0.2)
BASOPHILS RELATIVE PERCENT: 0.4 % (ref 0–2)
BILIRUB SERPL-MCNC: 0.4 MG/DL (ref 0–1.2)
BUN BLDV-MCNC: 54 MG/DL (ref 6–23)
CALCIUM SERPL-MCNC: 7.5 MG/DL (ref 8.6–10.2)
CELL COUNT FLUID TYPE: NORMAL
CHLORIDE BLD-SCNC: 105 MMOL/L (ref 98–107)
CHOLESTEROL, TOTAL: 150 MG/DL (ref 0–199)
CO2: 26 MMOL/L (ref 22–29)
COLOR FLUID: NORMAL
CREAT SERPL-MCNC: 5.9 MG/DL (ref 0.5–1)
EOSINOPHILS ABSOLUTE: 0.17 E9/L (ref 0.05–0.5)
EOSINOPHILS RELATIVE PERCENT: 3.7 % (ref 0–6)
FERRITIN: 264 NG/ML
FLUID TYPE: NORMAL
FOLATE: 3.3 NG/ML (ref 4.8–24.2)
GFR SERPL CREATININE-BSD FRML MDRD: 7 ML/MIN/1.73
GLUCOSE BLD-MCNC: 87 MG/DL (ref 74–99)
HCT VFR BLD CALC: 25.5 % (ref 34–48)
HDLC SERPL-MCNC: 48 MG/DL
HEMOGLOBIN: 7.6 G/DL (ref 11.5–15.5)
IMMATURE GRANULOCYTES #: 0.02 E9/L
IMMATURE GRANULOCYTES %: 0.4 % (ref 0–5)
IRON SATURATION: 18 % (ref 15–50)
IRON: 28 MCG/DL (ref 37–145)
LDL CHOLESTEROL CALCULATED: 81 MG/DL (ref 0–99)
LYMPHOCYTES ABSOLUTE: 0.62 E9/L (ref 1.5–4)
LYMPHOCYTES RELATIVE PERCENT: 13.5 % (ref 20–42)
MAGNESIUM: 2.5 MG/DL (ref 1.6–2.6)
MCH RBC QN AUTO: 28.4 PG (ref 26–35)
MCHC RBC AUTO-ENTMCNC: 29.8 % (ref 32–34.5)
MCV RBC AUTO: 95.1 FL (ref 80–99.9)
MONOCYTE, FLUID: 68 %
MONOCYTES ABSOLUTE: 0.39 E9/L (ref 0.1–0.95)
MONOCYTES RELATIVE PERCENT: 8.5 % (ref 2–12)
NEUTROPHIL, FLUID: 32 %
NEUTROPHILS ABSOLUTE: 3.37 E9/L (ref 1.8–7.3)
NEUTROPHILS RELATIVE PERCENT: 73.5 % (ref 43–80)
NUCLEATED CELLS FLUID: 66 /UL
OVALOCYTES: ABNORMAL
PARATHYROID HORMONE INTACT: 104 PG/ML (ref 15–65)
PDW BLD-RTO: 14.8 FL (ref 11.5–15)
PHOSPHORUS: 4.4 MG/DL (ref 2.5–4.5)
PLATELET # BLD: 97 E9/L (ref 130–450)
PLATELET CONFIRMATION: NORMAL
PMV BLD AUTO: 11.8 FL (ref 7–12)
POIKILOCYTES: ABNORMAL
POTASSIUM SERPL-SCNC: 3.6 MMOL/L (ref 3.5–5)
RBC # BLD: 2.68 E12/L (ref 3.5–5.5)
RBC FLUID: <2000 /UL
SEDIMENTATION RATE, ERYTHROCYTE: 10 MM/HR (ref 0–20)
SODIUM BLD-SCNC: 142 MMOL/L (ref 132–146)
TOTAL IRON BINDING CAPACITY: 157 MCG/DL (ref 250–450)
TOTAL PROTEIN: 5.9 G/DL (ref 6.4–8.3)
TRIGL SERPL-MCNC: 104 MG/DL (ref 0–149)
TSH SERPL DL<=0.05 MIU/L-ACNC: 11.87 UIU/ML (ref 0.27–4.2)
URIC ACID, SERUM: 11.9 MG/DL (ref 2.4–5.7)
VITAMIN B-12: >2000 PG/ML (ref 211–946)
VLDLC SERPL CALC-MCNC: 21 MG/DL
WBC # BLD: 4.6 E9/L (ref 4.5–11.5)

## 2023-03-09 PROCEDURE — 82042 OTHER SOURCE ALBUMIN QUAN EA: CPT

## 2023-03-09 PROCEDURE — 2500000003 HC RX 250 WO HCPCS: Performed by: INTERNAL MEDICINE

## 2023-03-09 PROCEDURE — 83036 HEMOGLOBIN GLYCOSYLATED A1C: CPT

## 2023-03-09 PROCEDURE — 83550 IRON BINDING TEST: CPT

## 2023-03-09 PROCEDURE — 89051 BODY FLUID CELL COUNT: CPT

## 2023-03-09 PROCEDURE — 97161 PT EVAL LOW COMPLEX 20 MIN: CPT | Performed by: PHYSICAL THERAPIST

## 2023-03-09 PROCEDURE — 97535 SELF CARE MNGMENT TRAINING: CPT

## 2023-03-09 PROCEDURE — C1729 CATH, DRAINAGE: HCPCS

## 2023-03-09 PROCEDURE — 36415 COLL VENOUS BLD VENIPUNCTURE: CPT

## 2023-03-09 PROCEDURE — 0W9G3ZZ DRAINAGE OF PERITONEAL CAVITY, PERCUTANEOUS APPROACH: ICD-10-PCS | Performed by: INTERNAL MEDICINE

## 2023-03-09 PROCEDURE — 88305 TISSUE EXAM BY PATHOLOGIST: CPT

## 2023-03-09 PROCEDURE — 85025 COMPLETE CBC W/AUTO DIFF WBC: CPT

## 2023-03-09 PROCEDURE — 97530 THERAPEUTIC ACTIVITIES: CPT

## 2023-03-09 PROCEDURE — 84550 ASSAY OF BLOOD/URIC ACID: CPT

## 2023-03-09 PROCEDURE — 84443 ASSAY THYROID STIM HORMONE: CPT

## 2023-03-09 PROCEDURE — 97165 OT EVAL LOW COMPLEX 30 MIN: CPT

## 2023-03-09 PROCEDURE — 6370000000 HC RX 637 (ALT 250 FOR IP): Performed by: NURSE PRACTITIONER

## 2023-03-09 PROCEDURE — 85651 RBC SED RATE NONAUTOMATED: CPT

## 2023-03-09 PROCEDURE — 84100 ASSAY OF PHOSPHORUS: CPT

## 2023-03-09 PROCEDURE — 80061 LIPID PANEL: CPT

## 2023-03-09 PROCEDURE — 88112 CYTOPATH CELL ENHANCE TECH: CPT

## 2023-03-09 PROCEDURE — 97530 THERAPEUTIC ACTIVITIES: CPT | Performed by: PHYSICAL THERAPIST

## 2023-03-09 PROCEDURE — 2580000003 HC RX 258: Performed by: INTERNAL MEDICINE

## 2023-03-09 PROCEDURE — 6370000000 HC RX 637 (ALT 250 FOR IP): Performed by: INTERNAL MEDICINE

## 2023-03-09 PROCEDURE — 83735 ASSAY OF MAGNESIUM: CPT

## 2023-03-09 PROCEDURE — 82150 ASSAY OF AMYLASE: CPT

## 2023-03-09 PROCEDURE — 80053 COMPREHEN METABOLIC PANEL: CPT

## 2023-03-09 PROCEDURE — 82746 ASSAY OF FOLIC ACID SERUM: CPT

## 2023-03-09 PROCEDURE — 83540 ASSAY OF IRON: CPT

## 2023-03-09 PROCEDURE — 1200000000 HC SEMI PRIVATE

## 2023-03-09 PROCEDURE — 49083 ABD PARACENTESIS W/IMAGING: CPT

## 2023-03-09 PROCEDURE — 87205 SMEAR GRAM STAIN: CPT

## 2023-03-09 PROCEDURE — 83970 ASSAY OF PARATHORMONE: CPT

## 2023-03-09 PROCEDURE — 82607 VITAMIN B-12: CPT

## 2023-03-09 PROCEDURE — 82728 ASSAY OF FERRITIN: CPT

## 2023-03-09 PROCEDURE — 87070 CULTURE OTHR SPECIMN AEROBIC: CPT

## 2023-03-09 RX ORDER — FOLIC ACID 1 MG/1
1 TABLET ORAL DAILY
Status: DISCONTINUED | OUTPATIENT
Start: 2023-03-09 | End: 2023-03-17 | Stop reason: HOSPADM

## 2023-03-09 RX ORDER — LEVOTHYROXINE SODIUM 0.1 MG/1
100 TABLET ORAL DAILY
Status: DISCONTINUED | OUTPATIENT
Start: 2023-03-09 | End: 2023-03-17 | Stop reason: HOSPADM

## 2023-03-09 RX ORDER — CHOLESTYRAMINE 4 G/9G
1 POWDER, FOR SUSPENSION ORAL 2 TIMES DAILY
Status: DISCONTINUED | OUTPATIENT
Start: 2023-03-09 | End: 2023-03-17 | Stop reason: HOSPADM

## 2023-03-09 RX ORDER — METOLAZONE 5 MG/1
5 TABLET ORAL ONCE
Status: COMPLETED | OUTPATIENT
Start: 2023-03-09 | End: 2023-03-09

## 2023-03-09 RX ORDER — COLCHICINE 0.6 MG/1
0.6 TABLET ORAL EVERY OTHER DAY
Status: DISCONTINUED | OUTPATIENT
Start: 2023-03-09 | End: 2023-03-17 | Stop reason: HOSPADM

## 2023-03-09 RX ADMIN — FOLIC ACID 1 MG: 1 TABLET ORAL at 20:49

## 2023-03-09 RX ADMIN — LEVOTHYROXINE SODIUM 100 MCG: 100 TABLET ORAL at 20:49

## 2023-03-09 RX ADMIN — CHOLESTYRAMINE 4 G: 4 POWDER, FOR SUSPENSION ORAL at 21:06

## 2023-03-09 RX ADMIN — OLANZAPINE 2.5 MG: 5 TABLET, ORALLY DISINTEGRATING ORAL at 21:06

## 2023-03-09 RX ADMIN — PANTOPRAZOLE SODIUM 40 MG: 40 TABLET, DELAYED RELEASE ORAL at 05:07

## 2023-03-09 RX ADMIN — BUMETANIDE 1 MG/HR: 0.25 INJECTION, SOLUTION INTRAMUSCULAR; INTRAVENOUS at 17:16

## 2023-03-09 RX ADMIN — ERGOCALCIFEROL 50000 UNITS: 1.25 CAPSULE ORAL at 05:06

## 2023-03-09 RX ADMIN — METOLAZONE 5 MG: 5 TABLET ORAL at 10:57

## 2023-03-09 RX ADMIN — CHOLESTYRAMINE 4 G: 4 POWDER, FOR SUSPENSION ORAL at 10:57

## 2023-03-09 RX ADMIN — CYANOCOBALAMIN TAB 1000 MCG 1000 MCG: 1000 TAB at 08:29

## 2023-03-09 RX ADMIN — HYDRALAZINE HYDROCHLORIDE 20 MG: 10 TABLET, FILM COATED ORAL at 21:06

## 2023-03-09 RX ADMIN — COLCHICINE 0.6 MG: 0.6 TABLET, FILM COATED ORAL at 20:49

## 2023-03-09 RX ADMIN — FLUOXETINE 20 MG: 20 CAPSULE ORAL at 08:29

## 2023-03-09 RX ADMIN — HYDRALAZINE HYDROCHLORIDE 20 MG: 10 TABLET, FILM COATED ORAL at 08:29

## 2023-03-09 RX ADMIN — SODIUM BICARBONATE 650 MG: 650 TABLET ORAL at 08:29

## 2023-03-09 RX ADMIN — AMLODIPINE BESYLATE 20 MG: 10 TABLET ORAL at 08:28

## 2023-03-09 RX ADMIN — Medication 50 MG: at 08:29

## 2023-03-09 RX ADMIN — SODIUM BICARBONATE 650 MG: 650 TABLET ORAL at 21:06

## 2023-03-09 ASSESSMENT — PAIN SCALES - GENERAL: PAINLEVEL_OUTOF10: 0

## 2023-03-09 NOTE — CARE COORDINATION
Case Management Assessment  Initial Evaluation    Date/Time of Evaluation: 3/9/2023 9:38 AM  Assessment Completed by: NAVJOT Arguello    If patient is discharged prior to next notation, then this note serves as note for discharge by case management. Patient Name: Augustus Carreno                   YOB: 1950  Diagnosis: Acute renal failure (ARF) ([facility] [address] [N17.9]                   Date / Time: 3/8/2023  9:59 AM    Patient Admission Status: Inpatient   Readmission Risk (Low < 19, Mod (19-27), High > 27): Readmission Risk Score: 17.4    Current PCP: Abida Daigle MD  PCP verified by CM? Yes    Chart Reviewed: Yes      History Provided by: Patient, Child/Family, Other (see comment) (jackie Martin, pts brother and sister in law also present)  Patient Orientation: Alert and Oriented, Person, Place, Situation    Patient Cognition: Alert    Hospitalization in the last 30 days (Readmission):  No    If yes, Readmission Assessment in CM Navigator will be completed. Advance Directives:      Code Status: Full Code   Patient's Primary Decision Maker is: Legal Next of Kin (jackie Russo)    Primary Decision Maker: Pedro Rodriguez - Child - [phone]    Discharge Planning:    Patient lives with: Children Type of Home: House  Primary Care Giver: Self (resides with jackie Martin he is not currently working till summer begins.)  Patient Support Systems include: Children, Family Members   Current Financial resources:    Current community resources:    Current services prior to admission: None            Current DME:              Type of Home Care services:  None    ADLS  Prior functional level: Independent in ADLs/IADLs  Current functional level: Other (see comment) (therapy on, pt fell at home. )    PT AM-PAC:   /24  OT AM-PAC:   /24    Family can provide assistance at DC: Yes  Would you like Case Management to discuss the discharge plan with any other family members/significant others, and if so, who?  Yes (racheal Martin and [facility])  Plans to Return to Present Housing: Other (see comment) (therapy on, HHC vs SNF depending on needs.)  Other Identified Issues/Barriers to RETURNING to current housing: [facility] vs SNF, therapy ordered. Potential Assistance needed at discharge: N/A              Patient expects to discharge to: home vs short term rehab, awaiting therapy    Plan for transportation at discharge: TBD anticipate family if able to sit in chair. Financial    Payor: MEDICARE / Plan: MEDICARE PART A / Product Type: *No Product type* /     Does insurance require precert for SNF: No    Potential assistance Purchasing Medications: No  Meds-to-Beds request: Yes      [facility] [address] - F [phone]  [address] [place] [address] RD. [facility] [address]  Phone: [phone] Fax: [phone]    Notes:    Ss note: 3/9/2023 9:40 AM No covid testing. Pt is on room air, presents from home w/son Paul Fried, pt took fall at home. Resides with son in 1 story home 3 steps/bilateral rails, another son Adali Ogden [phone] also . PTA pt has straight cane & quad cane she doesn't use. Reports independence but son relays pt has been declining with ambulation, pt does not drive. son does homemaking chores, son not currently working until the summer begins. PCP is Dr. Elgin Arteaga. Follows at [facility] for iron infusion every 4 weeks but has not needed them for over a month and half as not needed? Son reports pt has a Fistula since Feb 10th No outpt HD yet. No hx HHC or SNF and therapy has been ordered. Discussed transition of care plan with pt and son, [facility] vs SNF, may be leaning towards SNF as pt needs to be able to ambulate and does steps per the son. Briefly reviewed SNF choices, family MAY be interested in [place] rehab. No final dc plan in place yet awaiting therapy evals and medical needs. SW will update pts other son [address] [place] [address] per request once therapy evals in. SW will follow. NAVJOT Guerra    Advance Care Planning   Healthcare Decision Maker:    Primary Decision Maker: Gabrielle Donnelly Child - [phone]        The Plan for Transition of Care is related to the following treatment goals of Acute renal failure (ARF) ([facility] [address] [N17.9]  I     The Patient and/or Patient Representative Agree with the Discharge Plan?       NAVJOT Vides  Case Management Department

## 2023-03-09 NOTE — PROGRESS NOTES
[address]  [place]. [place]        Date:3/9/2023                                                  Patient Name: Lilliana Winslow    MRN: [id number]    : 1950    Room: [address] [place] [address]      Evaluating OT: Jeannine Varghese OTR/L; [id number]     Referring Provider and Specific Provider Orders/Date:      23 111  OT eval and treat  Start:  23 111,   End:  23 1115,   ONE TIME,   Standing Count:  1 Occurrences,   R         Evetta Null Bisel, DO      Placement Recommendation: Subacute        Diagnosis:   No diagnosis found.        Surgery: anticipate paracentesis d/t abdominal ascites      Pertinent Medical History:       Past Medical History:   Diagnosis Date    Anemia     iron deficiency    Hypertension     Kidney failure          Past Surgical History:   Procedure Laterality Date    CT BIOPSY RENAL  11/10/2022    CT BIOPSY RENAL 11/10/2022 Juan Tirado MD [facility]    DIALYSIS FISTULA CREATION Left 2/10/2023    CREATION ARTERIOVENOUS FISTULA LEFT ARM performed by Faith Mason MD at [facility]      Precautions:  Fall Risk, multiple picked area on the skin with scabbing, chronic kidney disease  Comment: had fallen at home this past Tuesday 3/7/23      Assessment of current deficits:     [x] Functional mobility  [x]ADLs  [x] Strength               []Cognition    [x] Functional transfers   [x] IADLs         [] Safety Awareness   [x]Endurance    [] Fine Coordination              [x] Balance      [] Vision/perception   []Sensation     []Gross Motor Coordination  [] ROM  [] Delirium                   [] Motor Control     OT PLAN OF CARE   OT POC based on physician orders, patient diagnosis and results of clinical assessment    Frequency/Duration 1-3 days/wk for 2 weeks PRN     Specific OT Treatment Interventions to include:   * Instruction/training on adapted ADL techniques and AE recommendations to increase functional independence within precautions       * Training on energy conservation strategies, correct breathing pattern and techniques to improve independence/tolerance for self-care routine  * Functional transfer/mobility training/DME recommendations for increased independence, safety, and fall prevention  * Patient/Family education to increase follow through with safety techniques and functional independence  * Recommendation of environmental modifications for increased safety with functional transfers/mobility and ADLs  * Therapeutic exercise to improve motor endurance, ROM, and functional strength for ADLs/functional transfers  * Therapeutic activities to facilitate/challenge dynamic balance, stand tolerance for increased safety and independence with ADLs  * Positioning to improve skin integrity, interaction with environment and functional independence    Recommended Adaptive Equipment: TBD at rehab      Home Living: with son; single family home, 1 story, 1+3 steps to enter with B rails, laundry in the basement, walk in shower. Equipment owned: quad cane, built in shower chairs     Prior Level of Function: Independent with ADLs, son assists with cooking, laundry and medicine management. Needs assistance with IADLs; ambulated with no device, fell this past Tuesday and the son states she hasent being able to walk since. Pt states both of her \"knees are bad. \"     Driving: not since October 2022  Occupation: no working     Pain Level: no pain at time of evaluation; Nursing notified.       Cognition: A&O: 3/4; Follows 2 step directions   Memory: good    Sequencing: good    Problem solving: fair    Judgement/safety: fair     [facility]   AM-PAC Daily Activity - Inpatient   How much help is needed for putting on and taking off regular lower body clothing?: A Lot  How much help is needed for bathing (which includes washing, rinsing, drying)?: A Lot  How much help is needed for toileting (which includes using toilet, bedpan, or urinal)?: Total  How much help is needed for putting on and taking off regular upper body clothing?: A Lot  How much help is needed for taking care of personal grooming?: A Lot  How much help for eating meals?: A Little  AM-PAC Inpatient Daily Activity Raw Score: 12  AM-PAC Inpatient ADL T-Scale Score : 30.6  ADL Inpatient CMS 0-100% Score: 66.57  ADL Inpatient CMS G-Code Modifier : CL     Functional Assessment:    Initial Eval Status  Date: 3/9/23 Treatment Status  Date: STGs = LTGs  Time frame: 10-14 days   Feeding Supervision with set up   Independent    Grooming Moderate Assist to massage shampoo cap into scalp and towel dry. Moderate Assist to brush hair. Supervision with set up provided for oral care: brush teeth and rinse while seated in bedside chair. Independent    UB Dressing Moderate Assist to doff and don hospital gown  Independent    LB Dressing Dependent   Supervision    Bathing Maximal Assist to sponge bathe while rolling in bed, power applied. Minimal Assist    Toileting Dependent for hygiene   Supervision    Bed Mobility  Supine to sit: Minimal Assist   Moderate assist to scoot out  Sit to supine: N/T as pt was up in chair   Supine to sit: Independent   Sit to supine: Independent    Functional Transfers Minimal Assist from EOB to wheeled walker. Transfer training with verbal cues for hand placement throughout session to improve safety. Independent    Functional Mobility Minimal Assist with EOB to bedside chair to improve balance, verbal cues for walker sequence and safety.    Modified Dent    Balance Sitting:     Static: good     Dynamic: fair   Standing: fair  with wheeled walker     Activity Tolerance fair   good    Visual/  Perceptual Glasses: yes                 Hand Dominance: right      AROM (PROM) Strength Additional Info:    RUE  WFL 4/5 good  and wfl FMC/dexterity noted during ADL tasks     LUE WFL 4/5 good  and wfl FMC/dexterity noted during ADL tasks  Fistula        Hearing: [facility]   Sensation:  No c/o numbness or tingling  Tone: WFL   Edema: no     Comments: Upon arrival the patient was supine. At end of session, patient was up in chair with call light and phone within reach, all lines and tubes intact. Overall patient demonstrated decreased independence and safety during completion of ADL/functional transfer/mobility tasks. Pt would benefit from continued skilled OT to increase safety and independence with completion of ADL/IADL tasks for functional independence and quality of life. Treatment: OT treatment provided this date includes:   Instruction/training on safety and adapted techniques for completion of ADLs   Instruction/training on safe functional mobility/transfer techniques   Instruction/training on energy conservation/work simplification for completion of ADLs    Rehab Potential: Good for established goals. Patient / Family Goal: feel better       Patient and/or family were instructed on functional diagnosis, prognosis/goals and OT plan of care. Demonstrated good understanding. Eval Complexity: Low    Time In: 9:38am   Time Out: 10:31am    Total Treatment Time: 38      Min Units   OT Eval Low 97165  X  1    OT Eval Medium 94401      OT Eval High 92026      OT Re-Eval 27802            ADL/Self Care 65152  28  2    Therapeutic Activities 27433  10 1     Therapeutic Ex 16311       Orthotic Management 43027       Manual [id number]     Neuro Re-Ed 67517       Non-Billable Time        Evaluation Time additionally includes thorough review of current medical information, gathering information on past medical history/social history and prior level of function, interpretation of standardized testing/informal observation of tasks, assessment of data and development of plan of care and goals.         Evaluating OT: Racheal Zuleta OTR/L; [id number]

## 2023-03-09 NOTE — CARE COORDINATION
Ss note: 3/9/2023 3:55 PM Will need Rapid covid test for SNF. Therapy has been ordered and rec SNF. Spoke with pts other son Klever Renae whom relays plan will be SNF at discharge for rehab prior to returning home with son Roxanna Soto. Referral made to [facility] awaiting chart review and acceptance, NO PRECERT. Discussed HHC after SNF and son Derrick Pearson interested in meals on wheels and possible private duty post rehab, sw will leave resources at bedside tomorrow. Son Merwyn Cooks works daily till 2 pm and relays he will be involved with decision making/discharge planning. Pt has Fistula, unsure if pt will require outpt HD prior to discharge. Need Hens and signed ELDA for SNF.  NAVJOT Hagen

## 2023-03-09 NOTE — PROGRESS NOTES
Internal Medicine Progress Note    [facility]    [facility]. Brandy Johnston., ISHMAELODARREN. Jesse Antoine D.O., GALINDO Burch D.O. Kourtney Mathews, MSN, APRN, NP-C  Colette Newton. Mars Somers, MSN, APRN-CNP     Primary Care Physician: Catalino Atwood MD   Admitting Physician:  Guadalupe Stephens DO  Admission date and time: 3/8/2023  9:59 AM    Room:  [facility]-22  Admitting diagnosis: Acute renal failure (ARF) [facility]) [N17.9]    Patient Name: Esme Aleman  MRN: [id number]    Date of Service: 3/9/2023     Subjective:  Martina Guerra is a 74 y.o. female who was seen and examined today,3/9/2023, at the bedside. Patient was in bed. Staff was present as they had just finished giving the patient a bath. Patient states she continues to have issue with itching and has been scratching herself profusely. She has multiple open areas and healing crusted areas on her skin. Admits to abdominal distention. She denies any nausea. Appetite fair. No family present during my examination. Review of System:   Constitutional:   Denies fever or chills, weight loss or gain, positive for fatigue/malaise. HEENT:   Denies ear pain, sore throat, sinus or eye problems. Cardiovascular:   Denies any chest pain, irregular heartbeats, or palpitations. Respiratory:   Denies shortness of breath, coughing, sputum production, hemoptysis, or wheezing. Gastrointestinal:   Denies nausea, vomiting, diarrhea, or constipation. Admits to abdominal distention. Mild discomfort. Genitourinary:    Denies any urgency, frequency, hematuria. Voiding  without difficulty with Wilkerson catheter. Extremities:   Positive for lower extremity swelling//edema. Neurology:    Denies any headache or focal neurological deficits, Denies generalized weakness or memory difficulty. Psch:   Denies being anxious or depressed.   Musculoskeletal:    Denies  myalgias, joint complaints or back pain. Integumentary:   Denies any rashes, ulcers, or excoriations. Denies bruising. Hematologic/Lymphatic:  Denies bruising or bleeding. Physical Exam:  I/O this shift:  In: 240 [P.O.:240]  Out: 450 [Urine:450]    Intake/Output Summary (Last 24 hours) at 3/9/2023 1654  Last data filed at 3/9/2023 1616  Gross per 24 hour   Intake 480 ml   Output 750 ml   Net -270 ml   I/O last 3 completed shifts: In: 240 [P.O.:240]  Out: 300 [Urine:300]  Patient Vitals for the past 96 hrs (Last 3 readings):   Weight   03/09/23 0500 184 lb (83.5 kg)   03/08/23 1000 190 lb 6.4 oz (86.4 kg)     Vital Signs:   Blood pressure (!) 114/56, pulse 77, temperature 97.8 °F (36.6 °C), temperature source Temporal, resp. rate 16, height 5' 2\" (1.575 m), weight 184 lb (83.5 kg), SpO2 97 %. General appearance:  Alert, responsive, oriented to person, place, and time. Well preserved, alert, no distress. Head:  Normocephalic. No masses, lesions or tenderness. Eyes:  PERRLA. EOMI. Sclera clear. Buccal mucosa moist.  Impaired vision. ENT:  Ears normal. Mucosa normal.  Neck:    Supple. Trachea midline. No thyromegaly. No JVD. No bruits. Heart:    Rhythm regular. Rate controlled. No murmurs. Lungs:    Symmetrical. Clear to auscultation bilaterally. No wheezes. No rhonchi. No rales. Abdomen:   Softly distended. Tender. Bowel sounds positive. No organomegaly or masses. Gross ascites. Extremities:    Peripheral pulses present. 2+ pitting edema the bilateral lower extremities. Edema/swelling of the upper extremities. AV fistula left upper extremity. Neurologic:    Alert x 3. No focal deficit. Cranial nerves grossly intact. No focal weakness. Psych:   Behavior is normal. Mood appears normal. Speech is not rapid and/or pressured. Musculoskeletal:   Spine ROM normal. Muscular strength weak. Gait not assessed. Integumentary:  Patient has new open areas as well as old healing scratches that are crusted over. Patient admits to digging at her skin secondary to itching. See EMR for full details under skin  Genitalia/Breast:  Wilkerson catheter. Medication:  Scheduled Meds:   cholestyramine  1 packet Oral BID    ferrous sulfate  325 mg Oral Once per day on Mon Wed Fri    amLODIPine  20 mg Oral Daily    FLUoxetine  20 mg Oral Daily    hydrALAZINE  20 mg Oral BID    OLANZapine zydis  2.5 mg Oral Nightly    sodium bicarbonate  650 mg Oral BID    vitamin B-12  1,000 mcg Oral Daily    zinc gluconate  50 mg Oral Daily    pantoprazole  40 mg Oral QAM AC    vitamin D  50,000 Units Oral Weekly     Continuous Infusions:   bumetanide 0.1 mg/mL infusion 1 mg/hr (03/09/23 1430)       Objective Data:  Recent Labs     03/08/23  1424 03/09/23  0805   WBC 7.1 4.6   RBC 2.92* 2.68*   HGB 8.3* 7.6*   HCT 28.4* 25.5*   MCV 97.3 95.1   MCH 28.4 28.4   MCHC 29.2* 29.8*   RDW 15.0 14.8   * 97*   MPV 11.9 11.8     Recent Labs     03/08/23  1424 03/09/23  0804    142   K 3.7 3.6    105   CO2 24 26   BUN 49* 54*   CREATININE 5.7* 5.9*   GLUCOSE 112* 87   CALCIUM 8.0* 7.5*   PROT 6.2* 5.9*   LABALBU 2.9* 2.6*   BILITOT 0.5 0.4   ALKPHOS 137* 147*   AST 22 21   ALT 7 7     No results found for: TROPONINI       Wound Documentation:   See EMR-patient does have multiple scratches on her body from scratching. Assessment:  Anasarca, probably secondary to progressive end-stage renal disease. End-stage renal disease with possible hepatorenal syndrome. Normochromic normocytic anemia. Essential hypertension. Hypothyroidism-newly diagnosed with TSH of 11.870  Hyperuricemia  Urticaria  History of depression. Urticaria secondary to liver disease. 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 Plan:   Patient has complaint of urticaria. We will add Questran and continue to evaluate. Nephrology is following patient recommendations of been reviewed. Currently following renal function no mention of dialysis. Continue diuresis.   Will increase Bumex drip from 0.5 to 1 mg/h. Strict intake and output. Daily weights. Patient does have ascites therefore will order paracentesis panel. Patient found to have hyperuricemia. We will treat accordingly. Vitamin D supplementation was added for vitamin D deficiency. Hemoglobin is trending downward. We will continue to monitor and transfuse as warranted. Patient was found to have folic acid deficiency and iron level was found to be 28 with a saturation of 18 and TIBC of 157. Patient to be initiated on Synthroid as TSH was found to be 11.870. Will obtain free T3. Continue current therapy. See orders for further plan of care. More than 50% of my  time was spent at the bedside counseling/coordinating care with the patient and/or family with face to face contact. This time was spent reviewing notes and laboratory data as well as instructing and counseling the patient. Time I spent with the family or surrogate(s) is included only if the patient was incapable of providing the necessary information or participating in medical decisions. I also discussed the differential diagnosis and all of the proposed management plans with the patient and individuals accompanying the patient. The patient was seen, examined and then discussed with Dr. Marianela Finley. Carmela Man, QUINTEN - CNP,   3/9/2023  4:54 PM       I saw and evaluated the patient. I agree with the findings and the plan of care as documented in Carmela Man NP-CRISTAL's  note.     Casey Martínez DO, D.O., FACOI  5:10 PM  3/9/2023

## 2023-03-09 NOTE — H&P
[address]                              HISTORY AND PHYSICAL    PATIENT NAME: OSCAR ROWLEY                       :        1950  MED REC NO:   [id number]                            ROOM:       [id number]  ACCOUNT NO:   [de-identified]                           ADMIT DATE: 2023  PROVIDER:     Ralph Justice DO    CHIEF COMPLAINT AND HISTORY OF CHIEF COMPLAINT:  This is a pleasant  57-year-old white female who was admitted to [address] [place] [address]. The  patient presented to the hospital here. Apparently, the patient did not  feel well. She was complaining of increasing amount of swelling of the  lower extremities with progressive weakness and increasing abdominal  fluid. The patient apparently went to [facility]  yesterday. At that time, she apparently did have what appeared to be  progressive end-stage renal disease and does follow up with [facility], Dr. Adelita Bush. The patient apparently saw Dr. Hilario Starkey, vascular  surgeon at [facility], on 2023. A fistula was placed at the  time on 02/10/2023. Fistula is not maturing at this time. The patient  is scheduled to have additional surgery on 2023. Apparently, the  patient, because of the symptoms of distended abdomen and increasing  abdominal girth, was transferred to [facility] where she was  met under the service of Dr. Fahad Silvestre and Dr. Asha Wyatt. The patient was seen  at this time. Exam at this time reveals a 57-year-old white female. From a cardiac standpoint of view, the patient currently denies any  chest pain. She does have increasing swelling of her lower extremities  along with exertional dyspnea. Some of this is a component of an  underlying volume overload. She does have a history of hypertension and  also of anemia. Respiratory wise, the patient denies any significant  smoking, history of productive cough. Gastrointestinal wise, the  patient denies any significant use of alcohol but does have evidence of  intraabdominal ascites. At that time, retroperitoneal ultrasound did  show a large amount of ascites present and there was evidence of anemia. Genitourinary wise, she does have a low urine output. She denies  neurologically any stroke, TIAs, or abnormalities, although she does  appear to be very weak and fatigued. ALLERGIES:  No known drug allergies. MEDICATIONS:  Current medicines at the present time include Prozac 20 mg  daily, Norvasc 10 mg daily, Prilosec 20 mg daily, Lasix 20 mg daily,  alprazolam 20 mg every 12 hours, Zyprexa 2.5 mg nightly, vitamin D, and  sodium bicarbonate 650 b.i.d. PAST MEDICAL HISTORY:  Positive for usual childhood diseases, anemia,  essential hypertension, and kidney failure. PAST SURGICAL HISTORY:  Includes CT and renal biopsy in 2022 and  dialysis fistula 2023. FAMILY HISTORY:  Parents are . SOCIAL HISTORY:  The patient never smoked. Social use of alcohol in the  past but very rarely. Denies any recreational drugs. The patient is  currently . Mother of two children. REVIEW OF CHART:  BUN and creatinine of 49 and 5.7 respectively. Electrolytes were satisfactory. Albumin is low at 2.9. Renal  ultrasound showed evidence of multiple renal cysts with large volume  ascites. Hemoglobin and hematocrit of 8.3 and 28.4 respectively. PHYSICAL EXAMINATION:  VITAL SIGNS:  Show height to be 5 feet 2 inches, weight is 190 pounds,  BMI 34.82. Blood pressure was 134/63, pulse 76, respirations 18. GENERAL APPEARANCE:  This is a 40-year-old white female who is alert,  responsive, and oriented to person, place, and time. Interviewed in the  presence of her son. HEENT:  Normal.  Hair distribution was normal.  Eyes revealed  conjunctivae were clear. NECK:  Revealed external JVD noted. Trachea midline. Thyroid not  palpable.   LUNGS: Diminished. HEART:  Fairly regular with a loud grade 3/6 murmur. No S3, no S4. ABDOMEN:  Markedly distended. Evidence of intraabdominal ascites noted. EXTREMITIES:  Revealed +2 pitting edema. Fistula was present in the  left forearm with slight thrill. SKIN:  Revealed evidence of multiple picked area on the skin with  crusting and scabbing. RECTAL:  Not performed. GENITAL:  Not performed. IMPRESSION:  Includes:  1. Anasarca, probably secondary to progressive end-stage renal disease. 2.  End-stage renal disease with possible hepatorenal syndrome. 3.  Normochromic normocytic anemia. 4.  Essential hypertension. 5.  History of depression. 6.  Urticaria secondary to liver disease. 7.  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 RECOMMENDATIONS:  Currently, the patient appeared to be stable. She has  been admitted at this time to the general telemetry floor. Consult will  be obtained with Nephrology at the present time. We will institute  diuretic infusion, possible paracentesis will be obtained. Otherwise,  the patient at this time might require dialysis with possibly temporary  fistula. We will continue to observe her and treat with her consultant. Make further recommendations as clinically indicated.         Donta Le DO    D: 03/08/2023 17:45:54       T: 03/08/2023 21:21:03     [email or web address]  Job#: [id number]     Doc#: [id number]    CC:

## 2023-03-09 NOTE — PROGRESS NOTES
Nephrology Progress Note  [facility]    Reason for Consult: MARY  Requesting Physician:  Dr Caterina Lorenzo   Date of Service: 3/9/2023     Subjective    Patient seen and examined this AM  Labs were pending at the time my evaluation this morning  She states she is feeling better  Denies shortness of breath. No abdominal pain or nausea. Tolerated breakfast.  She can not comment on her edema. Past Medical History:        Diagnosis Date    Anemia     iron deficiency    Hypertension     Kidney failure        Past Surgical History:        Procedure Laterality Date    CT BIOPSY RENAL  11/10/2022    CT BIOPSY RENAL 11/10/2022 Juan Manning MD [facility] CT    DIALYSIS FISTULA CREATION Left 2/10/2023    CREATION ARTERIOVENOUS FISTULA LEFT ARM performed by Sherita Segal MD at [facility] OR       Current Medications:    Current Facility-Administered Medications: cholestyramine (QUESTRAN) packet 4 g, 1 packet, Oral, BID  ferrous sulfate (IRON 325) tablet 325 mg, 325 mg, Oral, Once per day on Mon Wed Fri  amLODIPine (NORVASC) tablet 20 mg, 20 mg, Oral, Daily  FLUoxetine (PROZAC) capsule 20 mg, 20 mg, Oral, Daily  hydrALAZINE (APRESOLINE) tablet 20 mg, 20 mg, Oral, BID  OLANZapine zydis (ZYPREXA) disintegrating tablet 2.5 mg, 2.5 mg, Oral, Nightly  sodium bicarbonate tablet 650 mg, 650 mg, Oral, BID  vitamin B-12 (CYANOCOBALAMIN) tablet 1,000 mcg, 1,000 mcg, Oral, Daily  zinc gluconate tablet 50 mg, 50 mg, Oral, Daily  bumetanide (BUMEX) 12.5 mg in sodium chloride 0.9 % 125 mL infusion, 1 mg/hr, IntraVENous, Continuous  pantoprazole (PROTONIX) tablet 40 mg, 40 mg, Oral, QAM AC  perflutren lipid microspheres (DEFINITY) injection 1.5 mL, 1.5 mL, IntraVENous, ONCE PRN  vitamin D (ERGOCALCIFEROL) capsule 50,000 Units, 50,000 Units, Oral, Weekly    Allergies:  Patient has no known allergies.       Physical exam:   Constitutional:  VITALS:  /61   Pulse 80   Temp 98.2 °F (36.8 °C) (Oral)   Resp 16   Ht 5' 2\" (1.575 m)   Wt 184 lb (83.5 kg)   SpO2 94%   BMI 33.65 kg/m²     Gen: alert, awake, no acute distress  Eyes: anicteric sclerae, eomi  HEENT: atraumatic/normocephalic  Lungs: clear to ascultation bilaterally, equal lung expansion  CV: RRR, no murmurs  Abdomen: soft, nontender, distended, normoactive bowel sounds  Extremitiy: +edema  : no CVA tenderness, +valles   Skin: no rash, tugor wnl  Neuro: no focal deficits, AOX3  Psych: cooperative and appropriate      Data:         Last 3 BMP  Recent Labs     03/08/23  1424 03/09/23  0804    142   K 3.7 3.6    105   CO2 24 26   BUN 49* 54*   CREATININE 5.7* 5.9*   GLUCOSE 112* 87   CALCIUM 8.0* 7.5*         Last 3 CMP:    Recent Labs     03/08/23  1424 03/09/23  0804    142   K 3.7 3.6    105   CO2 24 26   BUN 49* 54*   CREATININE 5.7* 5.9*   GLUCOSE 112* 87   CALCIUM 8.0* 7.5*   PROT 6.2* 5.9*   LABALBU 2.9* 2.6*   BILITOT 0.5 0.4   ALKPHOS 137* 147*   AST 22 21   ALT 7 7         Last 3 Glucose:     Recent Labs     03/08/23  1424 03/09/23  0804   GLUCOSE 112* 87         Last 3 POC Glucose:     No results for input(s): POCGLU in the last 72 hours. Last 3 CK, CKMB, Troponin  No results for input(s): CKTOTAL, CKMB, TROPONINI in the last 72 hours. Last 3 CBC:  Recent Labs     03/08/23  1424 03/09/23  0805   WBC 7.1 4.6   RBC 2.92* 2.68*   HGB 8.3* 7.6*   HCT 28.4* 25.5*   MCV 97.3 95.1   MCH 28.4 28.4   MCHC 29.2* 29.8*   RDW 15.0 14.8   * 97*   MPV 11.9 11.8       Last 3 Hepatic Function Panel:    Recent Labs     03/08/23  1424 03/09/23  0804   ALKPHOS 137* 147*   ALT 7 7   AST 22 21   PROT 6.2* 5.9*   BILITOT 0.5 0.4   LABALBU 2.9* 2.6*       Albumin:  Recent Labs     03/09/23  0804   LABALBU 2.6*       Calcium:  Recent Labs     03/09/23  0804   CALCIUM 7.5*       Ionized Calcium:  No results for input(s): IONCA in the last 72 hours.     Magnesium:    Recent Labs     03/09/23  0804   MG 2.5         ABGs:  No results for input(s): PHART, PO2ART, PWV9JAY, HNJ0PIN, BEART, Q1NLLUYB in the last 72 hours. Lactic Acid:  No results for input(s): LACTA in the last 72 hours. Last 3 Amylase:  No results for input(s): AMYLASE in the last 72 hours. Last 3 Lipase:  No results for input(s): LIPASE in the last 72 hours. Last 3 BNP:  No results for input(s): BNP in the last 72 hours. Assessment/Plan      1. Acute kidney injury on chronic kidney disease V  Kidney injury presumed in the setting of decreased effective renal perfusion  Cannot rule out hepatorenal syndrome  Renal US with bilateral cysts and ascites  Urine studies with sodium of 50 - on loop diuretic  UA with 10-20 RBCs, 100 protein  Chronic kidney disease with biopsy proven IgA nephropathy with severe changes from Nov 2022.    2. Chronic kidney disease stage V not yet on dialysis-   In the setting of biopsy-proven IgA nephropathy  Creatinine at [facility] on 3/7 was 6.1 g/dL  Recent baseline serum creatinine 4.37 to 4.50 mg/dL  Follow renal function     3. Volume overload-  In the setting of cirrhosis and acute renal failure  On bumex gtt    4. Hypertension with chronic kidney disease stage V-  Blood pressure goal less than 130/80  Currently at goal  Follow closely and avoid hypotension with plan diuresis     5. Secondary hyperparathyroidism of renal origin-  PTH pending  Vitamin D <6 - start replacement    6. Anemia of chronic kidney disease-  Been maintained as an outpatient on an VIKKI via the [address] iron studies        ==========================      Renal function poor but stable   Remains volume overloaded. Continue Bumex gtt   Appears to be having adequate response, though urine output not being documented   She has abdominal ascites and is anticipating a paracentesis   There is no acute indication for dialysis but will follow closely. Ideally she would start with an AV fistula. AV fistula present but not matured.   Would need tunneled dialysis line if dialysis initiated this admission   Vitamin-D severely low, started vitamin-D replacement   Metabolic acidosis controlled.   May be able to stop sodium bicarbonate soon       Discussed with son at bedside    Thank you for the opportunity to participate in the care of  Ms Nadeem Shabazz     ______________________________      Nia Poe MD  3/9/2023  11:39 AM

## 2023-03-10 ENCOUNTER — APPOINTMENT (OUTPATIENT)
Dept: ULTRASOUND IMAGING | Age: 73
End: 2023-03-10
Attending: FAMILY MEDICINE
Payer: MEDICARE

## 2023-03-10 LAB
ALBUMIN SERPL-MCNC: 2.5 G/DL (ref 3.5–5.2)
ALP BLD-CCNC: 133 U/L (ref 35–104)
ALT SERPL-CCNC: 7 U/L (ref 0–32)
ANION GAP SERPL CALCULATED.3IONS-SCNC: 12 MMOL/L (ref 7–16)
AST SERPL-CCNC: 20 U/L (ref 0–31)
BASOPHILS ABSOLUTE: 0 E9/L (ref 0–0.2)
BASOPHILS RELATIVE PERCENT: 0.5 % (ref 0–2)
BILIRUB SERPL-MCNC: 0.3 MG/DL (ref 0–1.2)
BUN BLDV-MCNC: 56 MG/DL (ref 6–23)
CALCIUM SERPL-MCNC: 7.2 MG/DL (ref 8.6–10.2)
CHLORIDE BLD-SCNC: 102 MMOL/L (ref 98–107)
CO2: 24 MMOL/L (ref 22–29)
CREAT SERPL-MCNC: 5.8 MG/DL (ref 0.5–1)
EOSINOPHILS ABSOLUTE: 0.15 E9/L (ref 0.05–0.5)
EOSINOPHILS RELATIVE PERCENT: 3.6 % (ref 0–6)
GFR SERPL CREATININE-BSD FRML MDRD: 7 ML/MIN/1.73
GLUCOSE BLD-MCNC: 88 MG/DL (ref 74–99)
HCT VFR BLD CALC: 22.8 % (ref 34–48)
HEMOGLOBIN: 7.2 G/DL (ref 11.5–15.5)
HYPOCHROMIA: ABNORMAL
LYMPHOCYTES ABSOLUTE: 0.21 E9/L (ref 1.5–4)
LYMPHOCYTES RELATIVE PERCENT: 4.5 % (ref 20–42)
MAGNESIUM: 2.4 MG/DL (ref 1.6–2.6)
MCH RBC QN AUTO: 29.1 PG (ref 26–35)
MCHC RBC AUTO-ENTMCNC: 31.6 % (ref 32–34.5)
MCV RBC AUTO: 92.3 FL (ref 80–99.9)
MONOCYTES ABSOLUTE: 0.08 E9/L (ref 0.1–0.95)
MONOCYTES RELATIVE PERCENT: 1.8 % (ref 2–12)
MYELOCYTE PERCENT: 0.9 % (ref 0–0)
NEUTROPHILS ABSOLUTE: 3.69 E9/L (ref 1.8–7.3)
NEUTROPHILS RELATIVE PERCENT: 89.3 % (ref 43–80)
NUCLEATED RED BLOOD CELLS: 0.9 /100 WBC
PDW BLD-RTO: 14.7 FL (ref 11.5–15)
PHOSPHORUS: 4.1 MG/DL (ref 2.5–4.5)
PLATELET # BLD: 82 E9/L (ref 130–450)
PLATELET CONFIRMATION: NORMAL
PMV BLD AUTO: 11.6 FL (ref 7–12)
POTASSIUM SERPL-SCNC: 3.7 MMOL/L (ref 3.5–5)
RBC # BLD: 2.47 E12/L (ref 3.5–5.5)
SODIUM BLD-SCNC: 138 MMOL/L (ref 132–146)
T3 FREE: 1.6 PG/ML (ref 2–4.4)
TOTAL PROTEIN: 5.5 G/DL (ref 6.4–8.3)
WBC # BLD: 4.1 E9/L (ref 4.5–11.5)

## 2023-03-10 PROCEDURE — 97110 THERAPEUTIC EXERCISES: CPT

## 2023-03-10 PROCEDURE — 76705 ECHO EXAM OF ABDOMEN: CPT

## 2023-03-10 PROCEDURE — 6370000000 HC RX 637 (ALT 250 FOR IP): Performed by: NURSE PRACTITIONER

## 2023-03-10 PROCEDURE — 2500000003 HC RX 250 WO HCPCS: Performed by: INTERNAL MEDICINE

## 2023-03-10 PROCEDURE — 84481 FREE ASSAY (FT-3): CPT

## 2023-03-10 PROCEDURE — 2580000003 HC RX 258: Performed by: INTERNAL MEDICINE

## 2023-03-10 PROCEDURE — 80053 COMPREHEN METABOLIC PANEL: CPT

## 2023-03-10 PROCEDURE — 93976 VASCULAR STUDY: CPT

## 2023-03-10 PROCEDURE — 97530 THERAPEUTIC ACTIVITIES: CPT

## 2023-03-10 PROCEDURE — 85025 COMPLETE CBC W/AUTO DIFF WBC: CPT

## 2023-03-10 PROCEDURE — 83735 ASSAY OF MAGNESIUM: CPT

## 2023-03-10 PROCEDURE — 6370000000 HC RX 637 (ALT 250 FOR IP): Performed by: INTERNAL MEDICINE

## 2023-03-10 PROCEDURE — 36415 COLL VENOUS BLD VENIPUNCTURE: CPT

## 2023-03-10 PROCEDURE — 6360000002 HC RX W HCPCS: Performed by: INTERNAL MEDICINE

## 2023-03-10 PROCEDURE — 84100 ASSAY OF PHOSPHORUS: CPT

## 2023-03-10 PROCEDURE — 1200000000 HC SEMI PRIVATE

## 2023-03-10 RX ORDER — POTASSIUM CHLORIDE 20 MEQ/1
20 TABLET, EXTENDED RELEASE ORAL ONCE
Status: COMPLETED | OUTPATIENT
Start: 2023-03-10 | End: 2023-03-10

## 2023-03-10 RX ADMIN — CHOLESTYRAMINE 4 G: 4 POWDER, FOR SUSPENSION ORAL at 08:09

## 2023-03-10 RX ADMIN — AMLODIPINE BESYLATE 20 MG: 10 TABLET ORAL at 08:08

## 2023-03-10 RX ADMIN — CYANOCOBALAMIN TAB 1000 MCG 1000 MCG: 1000 TAB at 08:08

## 2023-03-10 RX ADMIN — FLUOXETINE 20 MG: 20 CAPSULE ORAL at 08:08

## 2023-03-10 RX ADMIN — SODIUM CHLORIDE 125 MG: 9 INJECTION, SOLUTION INTRAVENOUS at 13:12

## 2023-03-10 RX ADMIN — SODIUM BICARBONATE 650 MG: 650 TABLET ORAL at 21:36

## 2023-03-10 RX ADMIN — POTASSIUM CHLORIDE 20 MEQ: 1500 TABLET, EXTENDED RELEASE ORAL at 10:32

## 2023-03-10 RX ADMIN — BUMETANIDE 1 MG/HR: 0.25 INJECTION, SOLUTION INTRAMUSCULAR; INTRAVENOUS at 20:11

## 2023-03-10 RX ADMIN — FOLIC ACID 1 MG: 1 TABLET ORAL at 08:09

## 2023-03-10 RX ADMIN — PANTOPRAZOLE SODIUM 40 MG: 40 TABLET, DELAYED RELEASE ORAL at 05:30

## 2023-03-10 RX ADMIN — FERROUS SULFATE TAB 325 MG (65 MG ELEMENTAL FE) 325 MG: 325 (65 FE) TAB at 08:09

## 2023-03-10 RX ADMIN — LEVOTHYROXINE SODIUM 100 MCG: 100 TABLET ORAL at 05:30

## 2023-03-10 RX ADMIN — Medication 50 MG: at 08:08

## 2023-03-10 RX ADMIN — SODIUM BICARBONATE 650 MG: 650 TABLET ORAL at 08:08

## 2023-03-10 RX ADMIN — BUMETANIDE 1 MG/HR: 0.25 INJECTION, SOLUTION INTRAMUSCULAR; INTRAVENOUS at 05:30

## 2023-03-10 RX ADMIN — CHOLESTYRAMINE 4 G: 4 POWDER, FOR SUSPENSION ORAL at 21:36

## 2023-03-10 RX ADMIN — OLANZAPINE 2.5 MG: 5 TABLET, ORALLY DISINTEGRATING ORAL at 21:37

## 2023-03-10 RX ADMIN — HYDRALAZINE HYDROCHLORIDE 20 MG: 10 TABLET, FILM COATED ORAL at 21:36

## 2023-03-10 RX ADMIN — HYDRALAZINE HYDROCHLORIDE 20 MG: 10 TABLET, FILM COATED ORAL at 08:09

## 2023-03-10 ASSESSMENT — PAIN SCALES - GENERAL: PAINLEVEL_OUTOF10: 0

## 2023-03-10 NOTE — PROGRESS NOTES
Subjective:    The patient is awake and alert.  Son is at bedside.  Has improvement in abdominal discomfort after paracentesis with removal of 7L.  Also has fatigue.    No problems overnight.  Denies chest pain, angina, dyspnea or abdominal discomfort. No nausea or vomiting. Tolerating diet.      Objective:    BP (!) 135/59   Pulse 92   Temp 98.3 °F (36.8 °C) (Oral)   Resp 16   Ht 5' 2\" (1.575 m)   Wt 184 lb (83.5 kg)   SpO2 95%   BMI 33.65 kg/m²     General: NAD  HEENT: No thrush or mucositis, EOMI  Heart:  RRR, no murmurs, gallops, or rubs.  Lungs:  CTA bilaterally, no wheeze, rales or rhonchi  Abd: BS present, nontender, +distended, no masses  Extrem:  No clubbing, cyanosis. +BLE edema  : valles   Lymphatics: No palpable adenopathy in cervical and supraclavicular regions  Skin: scattered bruises.    CBC with Differential:    Lab Results   Component Value Date/Time    WBC 4.1 03/10/2023 06:55 AM    RBC 2.47 03/10/2023 06:55 AM    HGB 7.2 03/10/2023 06:55 AM    HCT 22.8 03/10/2023 06:55 AM    PLT 82 03/10/2023 06:55 AM    MCV 92.3 03/10/2023 06:55 AM    MCH 29.1 03/10/2023 06:55 AM    MCHC 31.6 03/10/2023 06:55 AM    RDW 14.7 03/10/2023 06:55 AM    NRBC 0.9 03/10/2023 06:55 AM    LYMPHOPCT 4.5 03/10/2023 06:55 AM    MONOPCT 1.8 03/10/2023 06:55 AM    MYELOPCT 0.9 03/10/2023 06:55 AM    BASOPCT 0.5 03/10/2023 06:55 AM    MONOSABS 0.08 03/10/2023 06:55 AM    LYMPHSABS 0.21 03/10/2023 06:55 AM    EOSABS 0.15 03/10/2023 06:55 AM    BASOSABS 0.00 03/10/2023 06:55 AM     CMP:    Lab Results   Component Value Date/Time     03/10/2023 06:55 AM    K 3.7 03/10/2023 06:55 AM    K 3.9 02/10/2023 07:40 AM     03/10/2023 06:55 AM    CO2 24 03/10/2023 06:55 AM    BUN 56 03/10/2023 06:55 AM    CREATININE 5.8 03/10/2023 06:55 AM    LABGLOM 7 03/10/2023 06:55 AM    GLUCOSE 88 03/10/2023 06:55 AM    PROT 5.5 03/10/2023 06:55 AM    LABALBU 2.5 03/10/2023 06:55 AM    CALCIUM 7.2 03/10/2023 06:55 AM    BILITOT 0.3  03/10/2023 06:55 AM    ALKPHOS 133 03/10/2023 06:55 AM    AST 20 03/10/2023 06:55 AM    ALT 7 03/10/2023 06:55 AM          Current Facility-Administered Medications:     ferric gluconate (FERRLECIT) 125 mg in sodium chloride 0.9 % 100 mL IVPB, 125 mg, IntraVENous, Daily, Rj Vazquezel DO    cholestyramine (QUESTRAN) packet 4 g, 1 packet, Oral, BID, Rj Vazquezel, DO, 4 g at 45/49/31 6503    folic acid (FOLVITE) tablet 1 mg, 1 mg, Oral, Daily, Ale Gregorio, APRN - CNP, 1 mg at 03/10/23 0809    colchicine (COLCRYS) tablet 0.6 mg, 0.6 mg, Oral, Every Other Day, Nemiah Pill, DO, 0.6 mg at 03/09/23 2049    levothyroxine (SYNTHROID) tablet 100 mcg, 100 mcg, Oral, Daily, Amber Vazquezel, DO, 100 mcg at 03/10/23 0530    ferrous sulfate (IRON 325) tablet 325 mg, 325 mg, Oral, Once per day on Mon Wed Fri, Rj Vazquezel, DO, 325 mg at 03/10/23 0809    amLODIPine (NORVASC) tablet 20 mg, 20 mg, Oral, Daily, Rj Vazquezel, DO, 20 mg at 03/10/23 0808    FLUoxetine (PROZAC) capsule 20 mg, 20 mg, Oral, Daily, Rj Vazquezel, DO, 20 mg at 03/10/23 7714    hydrALAZINE (APRESOLINE) tablet 20 mg, 20 mg, Oral, BID, Rj Vazquezel, DO, 20 mg at 03/10/23 0809    OLANZapine zydis (ZYPREXA) disintegrating tablet 2.5 mg, 2.5 mg, Oral, Nightly, Rj Wayne, DO, 2.5 mg at 03/09/23 2106    sodium bicarbonate tablet 650 mg, 650 mg, Oral, BID, Amber Santana Bisel, DO, 650 mg at 03/10/23 0808    vitamin B-12 (CYANOCOBALAMIN) tablet 1,000 mcg, 1,000 mcg, Oral, Daily, Amber Vazquezel, DO, 1,000 mcg at 03/10/23 8484    zinc gluconate tablet 50 mg, 50 mg, Oral, Daily, Rj Wayne DO, 50 mg at 03/10/23 0808    bumetanide (BUMEX) 12.5 mg in sodium chloride 0.9 % 125 mL infusion, 1 mg/hr, IntraVENous, Continuous, Rj Wayne DO, Last Rate: 10 mL/hr at 03/10/23 0530, 1 mg/hr at 03/10/23 0530    pantoprazole (PROTONIX) tablet 40 mg, 40 mg, Oral, QAM AC, Rj Wayne DO, 40 mg at 03/10/23 0530    perflutren lipid microspheres (DEFINITY) injection 1.5 mL, 1.5 mL, IntraVENous, ONCE PRN, Rj Wayne DO    vitamin D (ERGOCALCIFEROL) capsule 50,000 Units, 50,000 Units, Oral, Weekly, Brown Nieto DO Rosana, 50,000 Units at 03/09/23 0506    US RETROPERITONEAL COMPLETE   Final Result   Multiple bilateral renal cysts with the largest measuring 2.7 cm. Large volume ascites. Bilateral hyperechoic kidneys. US GUIDED PARACENTESIS    (Results Pending)       Assessment:    Principal Problem:    Acute renal failure (ARF) ([address]  Resolved Problems:    * No resolved hospital problems. *    73 yo female known to me with multifactorial anemia in setting of CKD stage IV (IgA nephropathy), iron deficiency, B12 deficiency, +Intrinsic factor Ab, pernicious anemia. Also hx of liver cirrhosis. She was treated with IV iron and recently started on VIKKI on 1/16/23 and given 1 dose, missed 2nd dose on 2/23/23. Admitted with volume overload and acute on CKD. S/p paracentesis. Plan:  Iron sat was low at 18%, give Ferrlecit 125 mg x 3 doses. VIKKI as outpatient once iron stores corrected. Does not require transfusion support today. Goal to maintain hgb > 7 g/dL. Diuresis per primary and nephrology.      Electronically signed by Yessica Ashton MD on 3/10/2023 at 10:42 AM

## 2023-03-10 NOTE — PROGRESS NOTES
Internal Medicine Progress Note    [facility]    Luis Miguelcary Bailon. Rocco Jensen., ISHMAELODARREN. Larry Lopes D.O., F.A.C.O.I. Corrinne Naval, D.O. Manya Craver, D.O. Matt Crooks, MSN, APRN, NP-C  Diaz Chris. Rosalia Zendejas, MSN, APRN-CNP     Primary Care Physician: Chente Duncan MD   Admitting Physician:  Hilda Nicole DO  Admission date and time: 3/8/2023  9:59 AM    Room:  [facility]-  Admitting diagnosis: Acute renal failure (ARF) [facility]) [N17.9]    Patient Name: Kelley Umanzor  MRN: [id number]    Date of Service: 3/10/2023     Subjective:  Viviana Sylvester is a 74 y.o. female who was seen and examined today,3/10/2023, at the bedside. Patient was sitting up in bed. States she actually does feel little bit better today. Slightly less abdominal distention. States she is taking Questran as ordered and she has noticed that the itching is improved. Denies shortness of breath. No nausea or vomiting. Kendra Wharton, son, present during my examination. Review of System:   Constitutional:   Denies fever or chills, weight loss or gain, positive for fatigue/malaise-improved. HEENT:   Denies ear pain, sore throat, sinus or eye problems. Cardiovascular:   Denies any chest pain, irregular heartbeats, or palpitations. Respiratory:   Denies shortness of breath, coughing, sputum production, hemoptysis, or wheezing. Gastrointestinal:   Denies nausea, vomiting, diarrhea, or constipation. Less abdominal distention. Genitourinary:    Denies any urgency, frequency, hematuria. Voiding  without difficulty with Wilkerson catheter. Extremities:   Positive for lower extremity swelling//edema. Neurology:    Denies any headache or focal neurological deficits, Denies generalized weakness or memory difficulty. Psch:   Denies being anxious or depressed. Musculoskeletal:    Denies  myalgias, joint complaints or back pain.    Integumentary:   Denies any rashes, ulcers, or excoriations. Denies bruising. Hematologic/Lymphatic:  Denies bruising or bleeding. Physical Exam:  I/O this shift:  In: -   Out: 450 [Urine:450]    Intake/Output Summary (Last 24 hours) at 3/10/2023 1537  Last data filed at 3/10/2023 1026  Gross per 24 hour   Intake 120 ml   Output 2000 ml   Net -1880 ml   I/O last 3 completed shifts: In: 600 [P.O.:600]  Out: 1850 [Urine:1850]  Patient Vitals for the past 96 hrs (Last 3 readings):   Weight   03/09/23 0500 184 lb (83.5 kg)   03/08/23 1000 190 lb 6.4 oz (86.4 kg)     Vital Signs:   Blood pressure (!) 135/59, pulse 92, temperature 98.3 °F (36.8 °C), temperature source Oral, resp. rate 16, height 5' 2\" (1.575 m), weight 184 lb (83.5 kg), SpO2 95 %. General appearance:  Alert, responsive, oriented to person, place, and time. Well preserved, alert, no distress. Head:  Normocephalic. No masses, lesions or tenderness. Eyes:  PERRLA. EOMI. Sclera clear. Buccal mucosa moist.  Impaired vision. ENT:  Ears normal. Mucosa normal.  Neck:    Supple. Trachea midline. No thyromegaly. No JVD. No bruits. Heart:    Rhythm regular. Rate controlled. No murmurs. Lungs:    Symmetrical. Clear to auscultation bilaterally. No wheezes. No rhonchi. No rales. Abdomen:   Softly distended. Tender. Bowel sounds positive. No organomegaly or masses. Gross ascites. Extremities:    Peripheral pulses present. 2+ pitting edema the bilateral lower extremities. Edema/swelling of the upper extremities. AV fistula left upper extremity. Neurologic:    Alert x 3. No focal deficit. Cranial nerves grossly intact. No focal weakness. Psych:   Behavior is normal. Mood appears normal. Speech is not rapid and/or pressured. Musculoskeletal:   Spine ROM normal. Muscular strength weak. Gait not assessed. Integumentary:  Patient has new open areas as well as old healing scratches that are crusted over. Patient admits to digging at her skin secondary to itching.   See EMR for full details under skin  Genitalia/Breast:  Wilkerson catheter. Medication:  Scheduled Meds:   ferric gluconate (FERRLECIT) IVPB  125 mg IntraVENous Daily    cholestyramine  1 packet Oral BID    folic acid  1 mg Oral Daily    colchicine  0.6 mg Oral Every Other Day    levothyroxine  100 mcg Oral Daily    ferrous sulfate  325 mg Oral Once per day on Mon Wed Fri    amLODIPine  20 mg Oral Daily    FLUoxetine  20 mg Oral Daily    hydrALAZINE  20 mg Oral BID    OLANZapine zydis  2.5 mg Oral Nightly    sodium bicarbonate  650 mg Oral BID    vitamin B-12  1,000 mcg Oral Daily    zinc gluconate  50 mg Oral Daily    pantoprazole  40 mg Oral QAM AC    vitamin D  50,000 Units Oral Weekly     Continuous Infusions:   bumetanide 0.1 mg/mL infusion 1 mg/hr (03/10/23 0530)       Objective Data:  Recent Labs     03/08/23  1424 03/09/23  0805 03/10/23  0655   WBC 7.1 4.6 4.1*   RBC 2.92* 2.68* 2.47*   HGB 8.3* 7.6* 7.2*   HCT 28.4* 25.5* 22.8*   MCV 97.3 95.1 92.3   MCH 28.4 28.4 29.1   MCHC 29.2* 29.8* 31.6*   RDW 15.0 14.8 14.7   * 97* 82*   MPV 11.9 11.8 11.6     Recent Labs     03/08/23  1424 03/09/23  0804 03/10/23  0655    142 138   K 3.7 3.6 3.7    105 102   CO2 24 26 24   BUN 49* 54* 56*   CREATININE 5.7* 5.9* 5.8*   GLUCOSE 112* 87 88   CALCIUM 8.0* 7.5* 7.2*   PROT 6.2* 5.9* 5.5*   LABALBU 2.9* 2.6* 2.5*   BILITOT 0.5 0.4 0.3   ALKPHOS 137* 147* 133*   AST 22 21 20   ALT 7 7 7     No results found for: TROPONINI       Wound Documentation:   See EMR-patient does have multiple scratches on her body from scratching. Assessment:  Anasarca, probably secondary to progressive end-stage renal disease. Cirrhosis-likely secondary to [facility]  Ascites  End-stage renal disease with possible hepatorenal syndrome. Normochromic normocytic anemia. Iron deficiency  Essential hypertension. Hypothyroidism-newly diagnosed with TSH of 11.870  Hyperuricemia  Urticaria  History of depression.   Urticaria secondary to liver disease. Plan:   Patient has complaint of urticaria. Improved on Questran - will continue. Will consult GI for ascites, anasarca and cirrhosis  Nephrology is following and patient was discussed face to face with Dr. Peyton Ennis. Currently following renal function, monitor output. No dialysis at this time. Agrees with increasing Bumex drip at this time. Daily weights. patient found to have hyperuricemia. We will treat accordingly. IV iron as prescribed for iron deficiency. Consult the [facility] as the patient does go there for outpatient iron infusion. Patient states she did undergo paracentesis-no report. Hemoglobin is trending downward. We will continue to monitor and transfuse as warranted. GI following. Continue current therapy. See orders for further plan of care. More than 50% of my  time was spent at the bedside counseling/coordinating care with the patient and/or family with face to face contact. This time was spent reviewing notes and laboratory data as well as instructing and counseling the patient. Time I spent with the family or surrogate(s) is included only if the patient was incapable of providing the necessary information or participating in medical decisions. I also discussed the differential diagnosis and all of the proposed management plans with the patient and individuals accompanying the patient. The patient was seen, examined and then discussed with Dr. Shannan Mcbride. QUINTEN Duong - CARLITO,   3/10/2023  3:37 PM       I saw and evaluated the patient. I agree with the findings and the plan of care as documented in Michelle Najera NP-C's  note.     Geovany Olivas DO, D.O., Alfred Hernandez  3:55 PM  3/10/2023

## 2023-03-10 NOTE — CONSULTS
Micki Lopez M.D. [facility]  Dr. Faith Lunsford M.D.,  Dr. Miller Hollis M.D.,  Dr. Hector Macias D.O.,  Dr. Amandeep Cormier D.O. ,  Dr. Elieser Dinh M.D.,          Conception Peel  74 y.o.  female      Re: Ascites, anasarca, liver disease  Requesting physician: Dr. Kathleen Harris  Date:12:32 PM 3/10/2023          HPI: Elderly  female seen in the hospital for above-described issue. Patient presented to the hospital on 8 March as a transfer from [facility]. Patient is accompanied by her son who provides additional information in regards to her medical history and HPI. Patient reports that she has history of cirrhosis for which she follows with Dr. Smuit Perdomo from our group and most recently have seen him approximately 3 months ago. Patient reports upper endoscopy and colonoscopy in the office in the summer/fall of last year with repeat upper endoscopy in October of last year. Patient complains of increasing abdominal distention and lower extremity edema. Patient reports that her abdominal distention has significantly improved after paracentesis yesterday. Patient denies currently any significant abdominal pain. She denies nausea vomiting. She denies bleeding. According to the patient and her son she is does not follow low-salt diet. Patient apparently has history of chronic renal disease with eventual plan to start hemodialysis. Patient apparently had an unsuccessful creation of AV fistula in her right arm.     Information sources:   -Patient  -family  -medical record  -health care team    PMHx:  Past Medical History:   Diagnosis Date    Anemia     iron deficiency    Hypertension     Kidney failure        PSHx:  Past Surgical History:   Procedure Laterality Date    CT BIOPSY RENAL  11/10/2022    CT BIOPSY RENAL 11/10/2022 Juan Huntley MD [facility]    DIALYSIS FISTULA CREATION Left 2/10/2023    CREATION ARTERIOVENOUS FISTULA LEFT ARM performed by Ramana Austin Marely Villarreal MD at [address] [facility]:  Current Facility-Administered Medications   Medication Dose Route Frequency Provider Last Rate Last Admin    ferric gluconate (FERRLECIT) 125 mg in sodium chloride 0.9 % 100 mL IVPB  125 mg IntraVENous Daily Rj Wayne, DO        cholestyramine (QUESTRAN) packet 4 g  1 packet Oral BID [facility] Bisel, DO   4 g at 01/46/12 8938    folic acid (FOLVITE) tablet 1 mg  1 mg Oral Daily QUINTEN Esquivel CNP   1 mg at 03/10/23 0809    colchicine (COLCRYS) tablet 0.6 mg  0.6 mg Oral Every Other Day Canary Allis, DO   0.6 mg at 03/09/23 2049    levothyroxine (SYNTHROID) tablet 100 mcg  100 mcg Oral Daily [facility] Bisel, DO   100 mcg at 03/10/23 0530    ferrous sulfate (IRON 325) tablet 325 mg  325 mg Oral Once per day on Mon Wed Fri Canary Allis, DO   325 mg at 03/10/23 0809    amLODIPine (NORVASC) tablet 20 mg  20 mg Oral Daily Rj Wayne, DO   20 mg at 03/10/23 7343    FLUoxetine (PROZAC) capsule 20 mg  20 mg Oral Daily Rj Wayne, DO   20 mg at 03/10/23 6587    hydrALAZINE (APRESOLINE) tablet 20 mg  20 mg Oral BID [facility] Bisel, DO   20 mg at 03/10/23 0809    OLANZapine zydis (ZYPREXA) disintegrating tablet 2.5 mg  2.5 mg Oral Nightly Rj Wayne, DO   2.5 mg at 03/09/23 2106    sodium bicarbonate tablet 650 mg  650 mg Oral BID [facility] Bisel, DO   650 mg at 03/10/23 4897    vitamin B-12 (CYANOCOBALAMIN) tablet 1,000 mcg  1,000 mcg Oral Daily [facility] Bisel, DO   1,000 mcg at 03/10/23 3360    zinc gluconate tablet 50 mg  50 mg Oral Daily Rj Wayne, DO   50 mg at 03/10/23 8029    bumetanide (BUMEX) 12.5 mg in sodium chloride 0.9 % 125 mL infusion  1 mg/hr IntraVENous Continuous Rj Wayne DO 10 mL/hr at 03/10/23 0530 1 mg/hr at 03/10/23 0530    pantoprazole (PROTONIX) tablet 40 mg  40 mg Oral QAM ABDELRAHMAN Wayne, DO   40 mg at 03/10/23 0530    perflutren lipid microspheres (DEFINITY) injection 1.5 mL  1.5 mL IntraVENous ONCE PRN Cannatali Allis, DO vitamin D (ERGOCALCIFEROL) capsule 50,000 Units  50,000 Units Oral Weekly Ravindra Metzger DO   50,000 Units at 03/09/23 0506        SocHx:  Social History     Socioeconomic History    Marital status:      Spouse name: Not on file    Number of children: 2    Years of education: Not on file    Highest education level: Not on file   Occupational History    Not on file   Tobacco Use    Smoking status: Never    Smokeless tobacco: Never   Vaping Use    Vaping Use: Never used   Substance and Sexual Activity    Alcohol use: Yes     Comment: rare    Drug use: Never    Sexual activity: Not on file   Other Topics Concern    Not on file   Social History Narrative    Not on file     Social Determinants of Health     Financial Resource Strain: Not on file   Food Insecurity: Not on file   Transportation Needs: Not on file   Physical Activity: Not on file   Stress: Not on file   Social Connections: Not on file   Intimate Partner Violence: Not on file   Housing Stability: Not on file       FamHx:No family history on file. Allergy:No Known Allergies      ROS: As described in the HPI and in addition is negative upon detailed review of systems or unobtainable unless otherwise stated in this dictation. PE:  BP (!) 135/59   Pulse 92   Temp 98.3 °F (36.8 °C) (Oral)   Resp 16   Ht 5' 2\" (1.575 m)   Wt 184 lb (83.5 kg)   SpO2 95%   BMI 33.65 kg/m²     Gen.: NAD/elderly  female  Head: Atraumatic/normocephalic  Eyes: EOMI/anicteric sclera  ENT: Moist oral mucosa/no discharge from nose ears  Neck: Supple with trachea midline  Chest: Symmetric excursion/no wheezing  Cor: Regular/S1-S2  Abd.: Soft and nontender. Mildly distended. BS +  Extr.:  BLE 2-3+ edema  Muscles: Decreased tone and bulk, consistent with age and condition  Skin: Warm and dry/anicteric.       DATA:     Lab Results   Component Value Date/Time    WBC 4.1 03/10/2023 06:55 AM    RBC 2.47 03/10/2023 06:55 AM    HGB 7.2 03/10/2023 06:55 AM    HCT 22.8 03/10/2023 06:55 AM    MCV 92.3 03/10/2023 06:55 AM    MCH 29.1 03/10/2023 06:55 AM    MCHC 31.6 03/10/2023 06:55 AM    RDW 14.7 03/10/2023 06:55 AM    PLT 82 03/10/2023 06:55 AM    MPV 11.6 03/10/2023 06:55 AM     Lab Results   Component Value Date/Time     03/10/2023 06:55 AM    K 3.7 03/10/2023 06:55 AM    K 3.9 02/10/2023 07:40 AM     03/10/2023 06:55 AM    CO2 24 03/10/2023 06:55 AM    BUN 56 03/10/2023 06:55 AM    CREATININE 5.8 03/10/2023 06:55 AM    CALCIUM 7.2 03/10/2023 06:55 AM    PROT 5.5 03/10/2023 06:55 AM    LABALBU 2.5 03/10/2023 06:55 AM    BILITOT 0.3 03/10/2023 06:55 AM    ALKPHOS 133 03/10/2023 06:55 AM    AST 20 03/10/2023 06:55 AM    ALT 7 03/10/2023 06:55 AM     No results found for: LIPASE  No results found for: AMYLASE      ASSESSMENT/PLAN:  Patient Active Problem List   Diagnosis    End stage renal disease ([facility]    Encounter regarding vascular access for dialysis for end-stage renal disease ([facility]    Acute renal failure (ARF) ([facility]     1. Cirrhosis  -Decompensated/nonalcoholic  -MELD cannot be calculated as no INR has been obtained  -Monitor MELD labs  -Obtain AFP and dedicated imaging    2. Anemia  -Acute on chronic  -Iron deficient  -No evidence of overt bleed  -Consider repeat upper endoscopy as inpatient if further decrease in H&H or overt bleed      3. Ascites  -S/P paracentesis -unknown amount removed as noted is pending  -Ascitic fluid analysis not consistent with SBP  -Defer diuretics to admitting/nephrology      4. Renal disease  -CKD stage IV-V  -Per admitting/pertinent consultants    Above has been discussed with patient and her son at bedside and all questions answered to their satisfaction. They verbalized understanding and agreement with the plan as delineated. Thank you for the opportunity to see this patient in consultation. Barbie Walls MD  3/10/2023  12:32 PM    NOTE:  This report was transcribed using voice recognition software.   Every effort was made to ensure accuracy; however, inadvertent computerized transcription errors may be present.

## 2023-03-10 NOTE — PLAN OF CARE
Problem: Pain  Goal: Verbalizes/displays adequate comfort level or baseline comfort level  3/9/2023 2255 by Cleo Grewal RN  Outcome: Progressing

## 2023-03-10 NOTE — PROGRESS NOTES
Nephrology Progress Note  [facility]    Reason for Consult: MARY  Requesting Physician:  Dr Mary Grace Waldrop   Date of Service: 3/10/2023     Subjective    Patient seen and examined this AM  Labs were pending at the time my evaluation this morning  She states she is feeling better  Feels better after paracentesis  Denies shortness of breath. No abdominal pain or nausea.           Past Medical History:        Diagnosis Date    Anemia     iron deficiency    Hypertension     Kidney failure        Past Surgical History:        Procedure Laterality Date    CT BIOPSY RENAL  11/10/2022    CT BIOPSY RENAL 11/10/2022 Juan Calles MD [facility]    DIALYSIS FISTULA CREATION Left 2/10/2023    CREATION ARTERIOVENOUS FISTULA LEFT ARM performed by Holland Wright MD at [facility] OR       Current Medications:    Current Facility-Administered Medications: cholestyramine (QUESTRAN) packet 4 g, 1 packet, Oral, BID  folic acid (FOLVITE) tablet 1 mg, 1 mg, Oral, Daily  colchicine (COLCRYS) tablet 0.6 mg, 0.6 mg, Oral, Every Other Day  levothyroxine (SYNTHROID) tablet 100 mcg, 100 mcg, Oral, Daily  ferrous sulfate (IRON 325) tablet 325 mg, 325 mg, Oral, Once per day on Mon Wed Fri  amLODIPine (NORVASC) tablet 20 mg, 20 mg, Oral, Daily  FLUoxetine (PROZAC) capsule 20 mg, 20 mg, Oral, Daily  hydrALAZINE (APRESOLINE) tablet 20 mg, 20 mg, Oral, BID  OLANZapine zydis (ZYPREXA) disintegrating tablet 2.5 mg, 2.5 mg, Oral, Nightly  sodium bicarbonate tablet 650 mg, 650 mg, Oral, BID  vitamin B-12 (CYANOCOBALAMIN) tablet 1,000 mcg, 1,000 mcg, Oral, Daily  zinc gluconate tablet 50 mg, 50 mg, Oral, Daily  bumetanide (BUMEX) 12.5 mg in sodium chloride 0.9 % 125 mL infusion, 1 mg/hr, IntraVENous, Continuous  pantoprazole (PROTONIX) tablet 40 mg, 40 mg, Oral, QAM AC  perflutren lipid microspheres (DEFINITY) injection 1.5 mL, 1.5 mL, IntraVENous, ONCE PRN  vitamin D (ERGOCALCIFEROL) capsule 50,000 Units, 50,000 Units, Oral, Weekly    Allergies:  Patient has no known allergies. Physical exam:   Constitutional:  VITALS:  BP (!) 135/59   Pulse 92   Temp 98.3 °F (36.8 °C) (Oral)   Resp 16   Ht 5' 2\" (1.575 m)   Wt 184 lb (83.5 kg)   SpO2 95%   BMI 33.65 kg/m²     Gen: alert, awake, no acute distress  Eyes: anicteric sclerae, eomi  HEENT: atraumatic/normocephalic  Lungs: clear to ascultation bilaterally, equal lung expansion  CV: RRR, no murmurs  Abdomen: soft, nontender, nondistended, normoactive bowel sounds  Extremitiy: +edema  : no CVA tenderness, +valles   Skin: no rash, tugor wnl  Neuro: no focal deficits  Psych: cooperative and appropriate      Data:         Last 3 BMP  Recent Labs     03/08/23  1424 03/09/23  0804 03/10/23  0655    142 138   K 3.7 3.6 3.7    105 102   CO2 24 26 24   BUN 49* 54* 56*   CREATININE 5.7* 5.9* 5.8*   GLUCOSE 112* 87 88   CALCIUM 8.0* 7.5* 7.2*           Last 3 CMP:    Recent Labs     03/08/23  1424 03/09/23  0804 03/10/23  0655    142 138   K 3.7 3.6 3.7    105 102   CO2 24 26 24   BUN 49* 54* 56*   CREATININE 5.7* 5.9* 5.8*   GLUCOSE 112* 87 88   CALCIUM 8.0* 7.5* 7.2*   PROT 6.2* 5.9* 5.5*   LABALBU 2.9* 2.6* 2.5*   BILITOT 0.5 0.4 0.3   ALKPHOS 137* 147* 133*   AST 22 21 20   ALT 7 7 7           Last 3 Glucose:     Recent Labs     03/08/23  1424 03/09/23  0804 03/10/23  0655   GLUCOSE 112* 87 88           Last 3 POC Glucose:     No results for input(s): POCGLU in the last 72 hours. Last 3 CK, CKMB, Troponin  No results for input(s): CKTOTAL, CKMB, TROPONINI in the last 72 hours.       Last 3 CBC:  Recent Labs     03/08/23  1424 03/09/23  0805 03/10/23  0655   WBC 7.1 4.6 4.1*   RBC 2.92* 2.68* 2.47*   HGB 8.3* 7.6* 7.2*   HCT 28.4* 25.5* 22.8*   MCV 97.3 95.1 92.3   MCH 28.4 28.4 29.1   MCHC 29.2* 29.8* 31.6*   RDW 15.0 14.8 14.7   * 97* 82*   MPV 11.9 11.8 11.6         Last 3 Hepatic Function Panel:    Recent Labs     03/08/23  1424 03/09/23  0804 03/10/23  0655   ALKPHOS 137* 147* 133*   ALT 7 7 7   AST 22 21 20   PROT 6.2* 5.9* 5.5*   BILITOT 0.5 0.4 0.3   LABALBU 2.9* 2.6* 2.5*         Albumin:  Recent Labs     03/10/23  0655   LABALBU 2.5*         Calcium:  Recent Labs     03/10/23  0655   CALCIUM 7.2*         Ionized Calcium:  No results for input(s): IONCA in the last 72 hours. Magnesium:    Recent Labs     03/10/23  0655   MG 2.4           ABGs:  No results for input(s): PHART, PO2ART, MGQ9WZV, RXQ6VBO, BEART, F3UOUASE in the last 72 hours. Lactic Acid:  No results for input(s): LACTA in the last 72 hours. Last 3 Amylase:  No results for input(s): AMYLASE in the last 72 hours. Last 3 Lipase:  No results for input(s): LIPASE in the last 72 hours. Last 3 BNP:  No results for input(s): BNP in the last 72 hours. Assessment/Plan      1. Acute kidney injury on chronic kidney disease V  Kidney injury presumed in the setting of decreased effective renal perfusion  Cannot rule out hepatorenal syndrome  Renal US with bilateral cysts and ascites  Urine studies with sodium of 50 - on loop diuretic  UA with 10-20 RBCs, 100 protein  Chronic kidney disease with biopsy proven IgA nephropathy with severe changes from Nov 2022.    2. Chronic kidney disease stage V not yet on dialysis-   In the setting of biopsy-proven IgA nephropathy  Creatinine at [facility] on 3/7 was 6.1 g/dL  Recent baseline serum creatinine 4.37 to 4.50 mg/dL  Follow renal function     3. Volume overload-  In the setting of cirrhosis and acute renal failure  On bumex gtt    4. Hypertension with chronic kidney disease stage V-  Blood pressure goal less than 130/80  Currently at goal  Follow closely and avoid hypotension with plan diuresis     5. Secondary hyperparathyroidism of renal origin-  PTH at 104  Vitamin D <6 - start replacement    6.   Anemia of chronic kidney disease-  Been maintained as an outpatient on an VIKKI via the [address] [address] [facility] studies        ==========================      Renal function poor but stable   Remains volume overloaded but improving daily. On Bumex gtt. Good response. Of note, urine output as documented not accurate   Underwent paracentesis on 3/9   There is no acute indication for dialysis but will follow closely. Ideally she would start with an AV fistula. AV fistula present but not matured. Would need tunneled dialysis line if dialysis initiated this admission   Vitamin-D severely low, started vitamin-D replacement   Metabolic acidosis controlled.   On sodium bicarbonate   Hypokalemia with diuretics - replace and follow         Discussed with son at bedside today outside of the room     Thank you for the opportunity to participate in the care of  Ms Alyce Oliva     ______________________________      Kimber Narvaez MD  3/10/2023  8:21 AM

## 2023-03-10 NOTE — DISCHARGE INSTR - COC
Continuity of Care Form    Patient Name: Migue Arce   :  1950  MRN:  [id number]    Admit date:  3/8/2023  Discharge date:  2023    Code Status Order: Full Code   Advance Directives:     Admitting Physician:  Theresa Helton DO  PCP: Jesusita Reilly MD    Discharging Nurse: Gaetano Hamilton RN  [address] Unit/Room#: 3063/1030-20  Discharging Unit Phone Number: [phone]      Emergency Contact:   Extended Emergency Contact Information  Primary Emergency Contact: Pedro Rodriguez  Address: [address] [place] [address] Phone: [phone]  Mobile Phone: [phone]  Relation: Child  Preferred language: English   needed?  No    Past Surgical History:  Past Surgical History:   Procedure Laterality Date    CT BIOPSY RENAL  11/10/2022    CT BIOPSY RENAL 11/10/2022 Juan Rees MD [facility]    DIALYSIS FISTULA CREATION Left 2/10/2023    CREATION ARTERIOVENOUS FISTULA LEFT ARM performed by Manisha Lopez MD at [address] [place] [address]       Immunization History:   Immunization History   Administered Date(s) Administered    COVID-19, PFIZER PURPLE top, DILUTE for use, (age 15 y+), 30mcg/0.3mL 2021, 2021       Active Problems:  Patient Active Problem List   Diagnosis Code    End stage renal disease ([facility] N18.6    Encounter regarding vascular access for dialysis for end-stage renal disease ([facility] N18.6, Z99.2    Acute renal failure (ARF) ([facility] N17.9       Isolation/Infection:   Isolation            No Isolation          Patient Infection Status       None to display            Nurse Assessment:  Last Vital Signs: BP (!) 135/59   Pulse 92   Temp 98.3 °F (36.8 °C) (Oral)   Resp 16   Ht 5' 2\" (1.575 m)   Wt 184 lb (83.5 kg)   SpO2 95%   BMI 33.65 kg/m²     Last documented pain score (0-10 scale): Pain Level: 0  Last Weight:   Wt Readings from Last 1 Encounters:   23 184 lb (83.5 kg)     Mental Status:  oriented    IV Access:  Right chest Tessio catheter for Dialysis        Nursing Mobility/ADLs:  Walking   Assisted  Transfer  Assisted  Bathing  Assisted  Dressing  Dependent  Toileting  Assisted  Feeding  Dependant  Med Admin  Assisted  Med Delivery   whole    Wound Care Documentation and Therapy:  Incision 02/10/23 Radial Left;Proximal (Active)   Number of days: 28        Elimination:  Continence: Bowel: No  Bladder: No  Urinary Catheter: None   Colostomy/Ileostomy/Ileal Conduit: No       Date of Last BM:    Intake/Output Summary (Last 24 hours) at 3/10/2023 1230  Last data filed at 3/10/2023 1026  Gross per 24 hour   Intake 360 ml   Output 2000 ml   Net -1640 ml     I/O last 3 completed shifts: In: 600 [P.O.:600]  Out: [address] [Urine:1850]    Safety Concerns: At Risk for Falls    Impairments/Disabilities:      Vision    Nutrition Therapy:  Current Nutrition Therapy:   - Oral Diet:  Low Sodium (2gm)    Routes of Feeding: Oral  Liquids: Thin Liquids  Daily Fluid Restriction: no  Last Modified Barium Swallow with Video (Video Swallowing Test): not done    Treatments at the Time of Hospital Discharge:   Respiratory Treatments: none  Oxygen Therapy:  is not on home oxygen therapy. Ventilator:    - No ventilator support    Rehab Therapies: Physical Therapy and Occupational Therapy  Weight Bearing Status/Restrictions: No weight bearing restrictions  Other Medical Equipment (for information only, NOT a DME order):  walker  Other Treatments: None    Patient's personal belongings (please select all that are sent with patient):  Atul    RN SIGNATURE:  Hazeline Marines RN    CASE MANAGEMENT/SOCIAL WORK SECTION    Inpatient Status Date: ***    Readmission Risk Assessment Score:  Readmission Risk              Risk of Unplanned Readmission:  15           Discharging to Facility/ Agency   Name:  [facility]  Address: [address] [place] [address]   Phone: [phone]  Fax: [phone]    Dialysis Facility (if applicable)   Name:  Address:  Dialysis Schedule:  Phone:  Fax:    / signature: Electronically signed by NAVJOT Goldsmith on 3/10/23 at 12:33 PM EST    PHYSICIAN SECTION    Prognosis: {Prognosis:8555404352}    Condition at Discharge: [address] Patient Condition:004527225}    Rehab Potential (if transferring to Rehab): {Prognosis:4616274143}    Recommended Labs or Other Treatments After Discharge: ***    Physician Certification: I certify the above information and transfer of Kelley Umanzor  is necessary for the continuing treatment of the diagnosis listed and that she requires {Admit to Appropriate Level of Care:02905} for {GREATER/LESS:367207476} 30 days.      Update Admission H&P: {CHP DME Changes in EZPVO:692265208}    PHYSICIAN SIGNATURE:  Electronically signed by Camilo Villalpando DO on 3/17/23 at 9:17 AM EDT

## 2023-03-10 NOTE — PROGRESS NOTES
Physical Therapy Treatment Note/Plan of Care    Room #:  3085/0318-66  Patient Name: Lukas Virgen  YOB: 1950  MRN: [id number]    Date of Service: 3/10/2023     Tentative placement recommendation: Subacute Rehab  Equipment recommendation: Kelly Padilla      Evaluating Physical Therapist: Brittney Hawkins, PT #[id number]      Specific Provider Orders/Date/Referring Provider : 03/08/23 1115    PT eval and treat  Start:  03/08/23 1115,   End:  03/08/23 1115,   ONE TIME,   Standing Count:  1 Occurrences,   R         Katya Maguire,       Admitting Diagnosis:   Acute renal failure (ARF) ([facility] [address] [N17.9]     Karli Oates yesterday-lost her balance- legs are too weak to support her    Went to [facility]- also has \"fluid in her belly and fluid in her legs\"  Surgery: none      Patient Active Problem List   Diagnosis    End stage renal disease ([facility] [address]    Encounter regarding vascular access for dialysis for end-stage renal disease ([facility] [address]    Acute renal failure (ARF) ([facility] [address]        ASSESSMENT of Current Deficits Patient exhibits decreased strength, balance, and endurance impairing functional mobility, transfers, gait , gait distance, and tolerance to activity slow danae, slight shortness of breath maintains O2 saturation. Cues needed for obstacle negotiation. Patient was motivated to work with therapy this date. Patient requires continued skilled physical therapy to address concerns listed above for increased safety and function at discharge.          PHYSICAL THERAPY  PLAN OF CARE       Physical therapy plan of care is established based on physician order,  patient diagnosis and clinical assessment    Current Treatment Recommendations:    -Bed Mobility: Lower extremity exercises   -Sitting Balance: Incorporate reaching activities to activate trunk muscles   -Standing Balance: Perform strengthening exercises in standing to promote motor control with or without upper extremity support   -Transfers: Provide instruction on proper hand and foot position for adequate transfer of weight onto lower extremities and use of gait device if needed and Cues for hand placement, technique and safety. Provide stabilization to prevent fall   -Gait: Gait training, Standing activities to improve: base of support, weight shift, weight bearing , and Pregait training to emphasize: Sequencing , Device control, Safety, and pacing   -Endurance: Utilize Supervised activities to increase level of endurance to allow for safe functional mobility including transfers and gait  and Use graduated activities to promote good breathing techniques and provide support and education to maximize respiratory function    PT long term treatment goals are located in below grid    Patient and or family understand(s) diagnosis, prognosis, and plan of care. Frequency of treatments: Patient will be seen  daily. Prior Level of Function: Patient ambulated independently    Rehab Potential: good - for baseline    Past medical history:   Past Medical History:   Diagnosis Date    Anemia     iron deficiency    Hypertension     Kidney failure      Past Surgical History:   Procedure Laterality Date    CT BIOPSY RENAL  11/10/2022    CT BIOPSY RENAL 11/10/2022 Juan Thomas MD [facility]    DIALYSIS FISTULA CREATION Left 2/10/2023    CREATION ARTERIOVENOUS FISTULA LEFT ARM performed by Idania Tejeda MD at [address]:    Precautions:    , falls ,      Imaging results: No results found. Social history: Patient lives with son in a ranch home  with 3 steps, bilateral rails  to enter home.    Walk in shower  , built in shower chair     Equipment owned: Cane and Quad cane,       AM-PAC Basic Mobility        AM-PAC Basic Mobility - Inpatient   How much help is needed turning from your back to your side while in a flat bed without using bedrails?: A Lot  How much help is needed moving from lying on your back to sitting on the side of a flat bed without using bedrails?: A Lot  How much help is needed moving to and from a bed to a chair?: A Little  How much help is needed standing up from a chair using your arms?: A Little  How much help is needed walking in hospital room?: A Lot  How much help is needed climbing 3-5 steps with a railing?: A Lot  AM-PAC Inpatient Mobility Raw Score : 14  AM-PAC Inpatient T-Scale Score : 38.1  Mobility Inpatient CMS 0-100% Score: 61.29  Mobility Inpatient CMS G-Code Modifier : CL    Nursing cleared patient for PT treatment. OBJECTIVE;   Initial Evaluation  Date: 3/9/2023 Treatment Date:    3/10/2023   Short Term/ Long Term   Goals   Was pt agreeable to Eval/treatment? Yes Yes  To be met in 3 days   Pain level   0/10    No pain     Bed Mobility    Rolling: Not assessed patient in chair     Rolling: Moderate assist of 1   Supine to sit: Moderate assist of 1   Sit to supine: Moderate assist of 1   Scooting: Minimal assist of 1    Rolling: Independent    Supine to sit: Independent    Sit to supine: Independent    Scooting: Independent     Transfers Sit to stand: Minimal assist of 1 Cues for hand placement and safety  Sit to stand: Minimal assist of 1 cues for hand placement   Sit to stand: Supervision      Ambulation     2 x 20 feet using  wheeled walker with Minimal assist of 1   for walker control and cues for walker approximation, safety, and pacing 12 feet and 2x40  feet using  wheeled walker with Moderate assist of 1   for walker approximation, balance, and safety obstacle negotiation.       2 x 50 feet using  wheeled walker with Supervision     Stair negotiation: ascended and descended   Not assessed  Not assessed         ROM Within functional limits    Increase range of motion 10% of affected joints    Strength BUE:  refer to OT eval  RLE:  3+/5  LLE:  3+/5  Increase strength in affected mm groups by 1/3 grade   Balance Sitting EOB:  not assessed    Dynamic Standing:  fair wheeled walker  Sitting EOB: fair   Dynamic Standing: fair Sitting EOB:  good    Dynamic Standing: good -wheeled walker      Patient is Alert & Oriented x person, place, time, and situation and follows directions    Sensation:  Patient  denies numbness/tingling   Edema:  yes bilateral lower extremities   Endurance: fair       Vitals: room air   Blood Pressure at rest  Blood Pressure during session    Heart Rate at rest 83 Heart Rate during session 86-90   SPO2 at rest 95%  SPO2 during session 92-94%     Patient education  Patient educated on role of Physical Therapy, risks of immobility, safety and plan of care,  importance of mobility while in hospital , ankle pumps, quad set and glut set for edema control, blood clot prevention, and positioning for skin integrity and comfort     Patient response to education:   Pt verbalized understanding Pt demonstrated skill Pt requires further education in this area   Yes Partial Yes      Treatment:  Patient practiced and was instructed/facilitated in the following treatment: Patient with bowel movement, rolled and assisted with hygiene and new chux and new gown. Stood ambulated to  chair, ambulated in room returned up to chair   performed exercises. Ambulated again in room, assisted to chair and positioned for comfort. Educated patient to perform exercise in her chair throughout the day. Therapeutic Exercises:  ankle pumps, heel raises, glut sets, hip abduction/adduction, long arc quad, and seated marching  x 15-20 reps. At end of session, patient in chair with  son present  call light and phone within reach,  all lines and tubes intact, nursing notified. Patient would benefit from continued skilled Physical Therapy to improve functional independence and quality of life.          Patient's/ family goals   get stronger    Time in  227  Time out  302    Total Treatment Time  35 minutes    CPT codes:    Therapeutic activities (57531)   22 minutes  1 unit(s)  Therapeutic exercises (55981)   13 minutes  1 unit(s)    Raymond Castro PTA [facility]#[id number]

## 2023-03-10 NOTE — PLAN OF CARE
Problem: Safety - Adult  Goal: Free from fall injury  3/9/2023 2255 by Hilary Mccurdy RN  Outcome: Progressing     Problem: Skin/Tissue Integrity  Goal: Absence of new skin breakdown  Description: 1. Monitor for areas of redness and/or skin breakdown  2. Assess vascular access sites hourly  3. Every 4-6 hours minimum:  Change oxygen saturation probe site  4. Every 4-6 hours:  If on nasal continuous positive airway pressure, respiratory therapy assess nares and determine need for appliance change or resting period.   3/9/2023 2255 by Hilary Mccurdy RN  Outcome: Progressing     Problem: Pain  Goal: Verbalizes/displays adequate comfort level or baseline comfort level  3/9/2023 2255 by Hilary Mccurdy RN  Outcome: Progressing

## 2023-03-10 NOTE — CARE COORDINATION
Ss note: 3/10/058563:20 AM Need Rapid Covid for SNF. Pt accepted to [place] for skilled rehab. Per kimberley Hernandez, pt has Medicare Part A only as primary insurance, NO PRECERT. Pt resides home with son Maricarmen Telles, son Lavinia Jenkins also involved and supportive and in agreement with SNF plan. Resources left at bedside for future reference to include [employer], meal programs, private duty care and transportation options. Pt has [facility], unclear if pt needs outpt HD arranged prior to discharge to SNF. Wilkins Sandifer, LSW    Ss note: 3/10/2023 11:03 AM Will need Rapid Covid for SNF. Referral made to [facility] for skilled rehab. Kimberley Buitrago states pt accepted HOWEVER now relays she needs to verify pts primary insurance, awaiting response on this matter. Pt resides home with son Maricarmen Telles, pt has [facility], unclear if pt will need outpt HD arranged prior to discharge. SW will follow.  Wilkins Sandifer, LSW

## 2023-03-11 LAB
ALBUMIN SERPL-MCNC: 2.5 G/DL (ref 3.5–5.2)
ALP BLD-CCNC: 125 U/L (ref 35–104)
ALT SERPL-CCNC: 6 U/L (ref 0–32)
ANION GAP SERPL CALCULATED.3IONS-SCNC: 11 MMOL/L (ref 7–16)
AST SERPL-CCNC: 18 U/L (ref 0–31)
BASOPHILS ABSOLUTE: 0 E9/L (ref 0–0.2)
BASOPHILS RELATIVE PERCENT: 0.6 % (ref 0–2)
BILIRUB SERPL-MCNC: 0.3 MG/DL (ref 0–1.2)
BUN BLDV-MCNC: 59 MG/DL (ref 6–23)
CALCIUM SERPL-MCNC: 7.3 MG/DL (ref 8.6–10.2)
CHLORIDE BLD-SCNC: 103 MMOL/L (ref 98–107)
CO2: 24 MMOL/L (ref 22–29)
CREAT SERPL-MCNC: 6 MG/DL (ref 0.5–1)
EOSINOPHILS ABSOLUTE: 0.06 E9/L (ref 0.05–0.5)
EOSINOPHILS RELATIVE PERCENT: 1.8 % (ref 0–6)
GFR SERPL CREATININE-BSD FRML MDRD: 7 ML/MIN/1.73
GLUCOSE BLD-MCNC: 79 MG/DL (ref 74–99)
HCT VFR BLD CALC: 23.6 % (ref 34–48)
HEMOGLOBIN: 7.3 G/DL (ref 11.5–15.5)
LYMPHOCYTES ABSOLUTE: 0.31 E9/L (ref 1.5–4)
LYMPHOCYTES RELATIVE PERCENT: 9.6 % (ref 20–42)
MAGNESIUM: 2.4 MG/DL (ref 1.6–2.6)
MCH RBC QN AUTO: 29 PG (ref 26–35)
MCHC RBC AUTO-ENTMCNC: 30.9 % (ref 32–34.5)
MCV RBC AUTO: 93.7 FL (ref 80–99.9)
METAMYELOCYTES RELATIVE PERCENT: 0.9 % (ref 0–1)
MONOCYTES ABSOLUTE: 0.12 E9/L (ref 0.1–0.95)
MONOCYTES RELATIVE PERCENT: 3.5 % (ref 2–12)
NEUTROPHILS ABSOLUTE: 2.64 E9/L (ref 1.8–7.3)
NEUTROPHILS RELATIVE PERCENT: 84.2 % (ref 43–80)
PDW BLD-RTO: 14.6 FL (ref 11.5–15)
PHOSPHORUS: 4.5 MG/DL (ref 2.5–4.5)
PLATELET # BLD: 69 E9/L (ref 130–450)
PLATELET CONFIRMATION: NORMAL
PMV BLD AUTO: 11.6 FL (ref 7–12)
POTASSIUM SERPL-SCNC: 3.8 MMOL/L (ref 3.5–5)
RBC # BLD: 2.52 E12/L (ref 3.5–5.5)
SODIUM BLD-SCNC: 138 MMOL/L (ref 132–146)
TOTAL PROTEIN: 5.6 G/DL (ref 6.4–8.3)
WBC # BLD: 3.1 E9/L (ref 4.5–11.5)

## 2023-03-11 PROCEDURE — 6370000000 HC RX 637 (ALT 250 FOR IP): Performed by: NURSE PRACTITIONER

## 2023-03-11 PROCEDURE — 80053 COMPREHEN METABOLIC PANEL: CPT

## 2023-03-11 PROCEDURE — 83735 ASSAY OF MAGNESIUM: CPT

## 2023-03-11 PROCEDURE — 6370000000 HC RX 637 (ALT 250 FOR IP): Performed by: INTERNAL MEDICINE

## 2023-03-11 PROCEDURE — 1200000000 HC SEMI PRIVATE

## 2023-03-11 PROCEDURE — 36415 COLL VENOUS BLD VENIPUNCTURE: CPT

## 2023-03-11 PROCEDURE — 6360000002 HC RX W HCPCS: Performed by: INTERNAL MEDICINE

## 2023-03-11 PROCEDURE — 85025 COMPLETE CBC W/AUTO DIFF WBC: CPT

## 2023-03-11 PROCEDURE — P9047 ALBUMIN (HUMAN), 25%, 50ML: HCPCS | Performed by: INTERNAL MEDICINE

## 2023-03-11 PROCEDURE — 2500000003 HC RX 250 WO HCPCS: Performed by: INTERNAL MEDICINE

## 2023-03-11 PROCEDURE — 82105 ALPHA-FETOPROTEIN SERUM: CPT

## 2023-03-11 PROCEDURE — 84100 ASSAY OF PHOSPHORUS: CPT

## 2023-03-11 PROCEDURE — 2580000003 HC RX 258: Performed by: INTERNAL MEDICINE

## 2023-03-11 RX ORDER — ALBUMIN (HUMAN) 12.5 G/50ML
25 SOLUTION INTRAVENOUS EVERY 8 HOURS
Status: COMPLETED | OUTPATIENT
Start: 2023-03-11 | End: 2023-03-12

## 2023-03-11 RX ADMIN — SODIUM CHLORIDE 125 MG: 9 INJECTION, SOLUTION INTRAVENOUS at 13:01

## 2023-03-11 RX ADMIN — CHOLESTYRAMINE 4 G: 4 POWDER, FOR SUSPENSION ORAL at 21:11

## 2023-03-11 RX ADMIN — HYDRALAZINE HYDROCHLORIDE 20 MG: 10 TABLET, FILM COATED ORAL at 09:39

## 2023-03-11 RX ADMIN — Medication 50 MG: at 09:40

## 2023-03-11 RX ADMIN — FOLIC ACID 1 MG: 1 TABLET ORAL at 09:39

## 2023-03-11 RX ADMIN — BUMETANIDE 1 MG/HR: 0.25 INJECTION, SOLUTION INTRAMUSCULAR; INTRAVENOUS at 09:46

## 2023-03-11 RX ADMIN — AMLODIPINE BESYLATE 20 MG: 10 TABLET ORAL at 09:39

## 2023-03-11 RX ADMIN — ALBUMIN (HUMAN) 25 G: 0.25 INJECTION, SOLUTION INTRAVENOUS at 21:02

## 2023-03-11 RX ADMIN — LEVOTHYROXINE SODIUM 100 MCG: 100 TABLET ORAL at 06:50

## 2023-03-11 RX ADMIN — SODIUM BICARBONATE 650 MG: 650 TABLET ORAL at 09:44

## 2023-03-11 RX ADMIN — PANTOPRAZOLE SODIUM 40 MG: 40 TABLET, DELAYED RELEASE ORAL at 06:50

## 2023-03-11 RX ADMIN — CYANOCOBALAMIN TAB 1000 MCG 1000 MCG: 1000 TAB at 09:39

## 2023-03-11 RX ADMIN — OLANZAPINE 2.5 MG: 5 TABLET, ORALLY DISINTEGRATING ORAL at 21:19

## 2023-03-11 RX ADMIN — HYDRALAZINE HYDROCHLORIDE 20 MG: 10 TABLET, FILM COATED ORAL at 21:19

## 2023-03-11 RX ADMIN — ALBUMIN (HUMAN) 25 G: 0.25 INJECTION, SOLUTION INTRAVENOUS at 13:38

## 2023-03-11 RX ADMIN — FLUOXETINE 20 MG: 20 CAPSULE ORAL at 09:40

## 2023-03-11 RX ADMIN — CHOLESTYRAMINE 4 G: 4 POWDER, FOR SUSPENSION ORAL at 09:40

## 2023-03-11 RX ADMIN — COLCHICINE 0.6 MG: 0.6 TABLET, FILM COATED ORAL at 09:40

## 2023-03-11 RX ADMIN — SODIUM BICARBONATE 650 MG: 650 TABLET ORAL at 21:19

## 2023-03-11 RX ADMIN — BUMETANIDE 1 MG/HR: 0.25 INJECTION, SOLUTION INTRAMUSCULAR; INTRAVENOUS at 20:59

## 2023-03-11 ASSESSMENT — PAIN SCALES - GENERAL
PAINLEVEL_OUTOF10: 0

## 2023-03-11 NOTE — PROGRESS NOTES
Nephrology Progress Note  3/11/2023 12:08 PM  Subjective:   Admit Date: 3/8/2023  PCP: Bridget Ivey MD    Interval History: Patient was seen in her room. Patient's son was present at the bedside. Patient felt much better after more than 7 L paracentesis fluid removal.  Currently not short of breath. Good appetite. No nausea or vomiting. Not on oxygen. Diet: ADULT DIET; Regular; Low Sodium (2 gm); 1500 ml    Data:     Scheduled Meds:   ferric gluconate (FERRLECIT) IVPB  125 mg IntraVENous Daily    cholestyramine  1 packet Oral BID    folic acid  1 mg Oral Daily    colchicine  0.6 mg Oral Every Other Day    levothyroxine  100 mcg Oral Daily    ferrous sulfate  325 mg Oral Once per day on Mon Wed Fri    amLODIPine  20 mg Oral Daily    FLUoxetine  20 mg Oral Daily    hydrALAZINE  20 mg Oral BID    OLANZapine zydis  2.5 mg Oral Nightly    sodium bicarbonate  650 mg Oral BID    vitamin B-12  1,000 mcg Oral Daily    zinc gluconate  50 mg Oral Daily    pantoprazole  40 mg Oral QAM AC    vitamin D  50,000 Units Oral Weekly     Continuous Infusions:   bumetanide 0.1 mg/mL infusion 1 mg/hr (03/11/23 0946)     PRN Meds:perflutren lipid microspheres  I/O last 3 completed shifts: In: 221.6 [P.O.:120; IV Piggyback:101.6]  Out: 2300 [Urine:2300]  No intake/output data recorded. Intake/Output Summary (Last 24 hours) at 3/11/2023 1208  Last data filed at 3/11/2023 0656  Gross per 24 hour   Intake 101.63 ml   Output 750 ml   Net -648. 37 ml     CBC:   Recent Labs     03/09/23  0805 03/10/23  0655 03/11/23 0618   WBC 4.6 4.1* 3.1*   HGB 7.6* 7.2* 7.3*   PLT 97* 82* 69*     BMP:    Recent Labs     03/09/23  0804 03/10/23  0655 03/11/23  0618    138 138   K 3.6 3.7 3.8    102 103   CO2 26 24 24   BUN 54* 56* 59*   CREATININE 5.9* 5.8* 6.0*   GLUCOSE 87 88 79     Hepatic:   Recent Labs     03/09/23  0804 03/10/23  0655 03/11/23  0618   AST 21 20 18   ALT 7 7 6   BILITOT 0.4 0.3 0.3   ALKPHOS 147* 133* 125* Protein/ Albumin:    Lab Results   Component Value Date    LABALBU 2.5 (L) 03/11/2023      Imaging Results          Procedure Component Value Ref Range Date/Time     US GUIDED PARACENTESIS [[id number]] Collected: 03/10/23 1652     Order Status: Completed Updated: 03/10/23 1655     Narrative:       PROCEDURE:   PARACENTESIS WITHOUT IMAGE GUIDANCE     US ABDOMEN LIMITED     3/9/2023     HISTORY:   ORDERING SYSTEM PROVIDED HISTORY: ascites   TECHNOLOGIST PROVIDED HISTORY:   Reason for exam:->ascites     TECHNIQUE:   Informed consent was obtained after a detailed explanation of the procedure   including risks, benefits, and alternatives. Universal protocol was   followed. A limited ultrasound of the abdomen was performed. The right   abdomen was prepped and draped in sterile fashion and local anesthesia was   achieved with lidocaine. An 8 [place] needle sheath was advanced into ascites   and paracentesis was performed. The patient tolerated the procedure well. FINDINGS:   Limited ultrasound of the abdomen demonstrates ascites. A total of 7150 cc   was removed. Impression:       Successful paracentesis. US DUP ABD PEL RETRO SCROT LIMITED [[id number]] Collected: 03/10/23 1614     Order Status: Completed Updated: 03/10/23 1618     Narrative:       EXAMINATION:   DOPPLER EVALUATION OF THE PELVIS     3/10/2023 1:26 pm     COMPARISON:   None. HISTORY:   ORDERING SYSTEM PROVIDED HISTORY: Portal hepatic veins/cirrhosis   TECHNOLOGIST PROVIDED HISTORY:   Reason for exam:->Portal hepatic veins/cirrhosis   What reading provider will be dictating this exam?->CRC     FINDINGS:   Cirrhotic liver. Moderate perihepatic ascites. Borderline gallbladder wall   thickening up to 0.4 cm, which could relate to chronic liver disease. No   shadowing gallstone. Negative sonographic Munson's sign. No significant   bile duct dilation with the common duct is 0.7 cm. Pancreas not well   visualized on this study.   Right kidney grossly unremarkable. Patent portal   vein, main hepatic artery, and hepatic veins. Impression:       Cirrhotic liver. Ascites. US LIVER [[id number]] Collected: 03/10/23 1606     Order Status: Completed Updated: 03/10/23 1617     Narrative:       EXAMINATION:   RIGHT UPPER QUADRANT ULTRASOUND     3/10/2023 1:25 pm     COMPARISON:   None. HISTORY:   ORDERING SYSTEM PROVIDED HISTORY: Cirrhosis/rule out mass   TECHNOLOGIST PROVIDED HISTORY:     Reason for exam:->Cirrhosis/rule out mass   What reading provider will be dictating this exam?->CRC     FINDINGS:   LIVER:  Cirrhotic liver. BILIARY SYSTEM:  Borderline gallbladder wall thickening 0.4 cm, which could   relate to chronic liver disease. No shadowing gallstone. Negative   sonographic Munson sign. Common bile duct is within normal limits measuring 0.7 cm. RIGHT KIDNEY: The right kidney is grossly unremarkable without evidence of   hydronephrosis. PANCREAS:  Pancreas not well visualized on this study. OTHER: Moderate perihepatic ascites. Portal vein patent. Hepatic artery   patent. Patent hepatic veins. Impression:       Cirrhotic liver. Ascites. IR US GUIDED PARACENTESIS [[id number]]      Order Status: Canceled      US RETROPERITONEAL COMPLETE [[id number]] Collected: 03/08/23 1506     Order Status: Completed Updated: 03/08/23 1510     Narrative:       EXAMINATION:   RETROPERITONEAL ULTRASOUND OF THE KIDNEYS AND URINARY BLADDER     3/8/2023     COMPARISON:   None     HISTORY:   ORDERING SYSTEM PROVIDED HISTORY: MARY   TECHNOLOGIST PROVIDED HISTORY:     Reason for exam:->MARY   What reading provider will be dictating this exam?->CRC     FINDINGS:     Kidneys: The right kidney measures 10.7 cm in length and the left kidney measures 11.7   cm  in length. Bilateral hyperechoic kidneys. No evidence of hydronephrosis or intrarenal   stones.   Multiple bilateral renal cysts with the largest measuring 2.7 cm   involving the mid zone of the left kidney. Bladder:     Wilkerson catheter in a decompressed urinary bladder. MISCELLANEOUS: Large volume ascites      Impression:       Multiple bilateral renal cysts with the largest measuring 2.7 cm. Large volume ascites. Bilateral hyperechoic kidneys. ECHO 3/09/2023    Normal left ventricular chamber size. Normal left ventricular systolic function, EF 02%. Mild left ventricular concentric hypertrophy noted. Stage II diastolic dysfunction. Left atrium is enlarged. Increased left atrial volume. Interatrial septum not well visualized but appears intact. Normal right ventricle size and function. There is mild mitral regurgitation. No mitral valve prolapse. Mild aortic stenosis - Mean gradient 18mmHg, peak 31mmHg, SELINA 1.7cm2 by   VTI, 1.6cm2 by Vmax, DLI 0.52. Mild aortic regurgitation, pressure 1/2 time 484ms. There is mild tricuspid regurgitation. Mild pulmonary hypertension, RVSP 45mmHg. Normal aortic root size. No evidence of pericardial effusion. Moderate to large pleural effusion. No intra cardiac mass or thrombus. No previous echo for comparison. Objective:     Vitals: /62   Pulse 83   Temp 98.1 °F (36.7 °C) (Oral)   Resp 18   Ht 5' 2\" (1.575 m)   Wt 184 lb (83.5 kg)   SpO2 99%   BMI 33.65 kg/m²   General appearance: Sitting up in bed. Awake alert and oriented not in acute distress. Patient's son was present in the room. Patient was not wearing oxygen. Lungs: Slightly diminished air movement both lung bases  Heart: No S3, No rub  Abdomen: Distended (ascites) lax and soft. No tenderness. L. Extremities: +1 bilateral equal edema    Assessment & Plan:     Stage V chronic kidney disease not on kidney replacement therapy yet.   The patient has an immature left upper arm fistula scheduled for surgical revision on April 6 I did explain to her that at any point if she develops uremic symptoms we will insert a right IJ tunneled dialysis catheter to initiate hemodialysis. I also explained to her that the optimal scenario would be to wait until her arm access is ready to be used and try to avoid a tunneled dialysis catheter placement. She expressed understanding. Her son also expressed understanding. Hypervolemia: In the context of advanced chronic kidney disease stage V and liver cirrhosis on imaging. She is status post 7 L paracentesis fluid removal.  She is on Bumex drip with acceptable urine output. I ordered IV albumin post aggressive paracentesis to avoid hypotension and this also may help with diuresis on Bumex drip. Hypocalcemia may be reflecting hypoalbuminemia: Check ionized calcium for accuracy. Anemia: Severe: Appreciate hematology service input. Likely of advanced stage V CKD. Transfuse as needed    Thrombocytopenia: In the context of liver disease. Patient is followed by hematology service      This note was created using voice recognition software.     Electronically signed by Vadim Villa MD on 3/11/2023 at 12:08 PM

## 2023-03-11 NOTE — PROGRESS NOTES
Internal Medicine Progress Note    [facility]    Rj Wayne D.O., NUPUR.                    Balwinder Collins D.O., CLIVE Yan D.O.   ARNO CamachoONydia Bain, MSN, APRN, NP-C  Steven Gruber, MSN, APRN-CNP     Primary Care Physician: Eufemia Garcia MD   Admitting Physician:  Rj Wayne DO  Admission date and time: 3/8/2023  9:59 AM    Room:  [address] [place] [address]  Admitting diagnosis: Acute renal failure (ARF) ([facility]) [N17.9]    Patient Name: Laurence Rodriguez  MRN: [id number]    Date of Service: 3/11/2023     Subjective:  aLurence is a 72 y.o. female who was seen and examined today,3/11/2023, at the bedside.  Patient was resting in bed.  States she does feel much better compared to when she came into the hospital.  Less abdominal distention.  Less discomfort.  Appetite a little better.  States itching is slightly improved on Questran.    Pedro, son, present during my examination.    Review of System:   Constitutional:   Denies fever or chills, weight loss or gain, positive for fatigue/malaise-improving  HEENT:   Denies ear pain, sore throat, sinus or eye problems.  Cardiovascular:   Denies any chest pain, irregular heartbeats, or palpitations.   Respiratory:   Denies shortness of breath, coughing, sputum production, hemoptysis, or wheezing.  Gastrointestinal:   Denies nausea, vomiting, diarrhea, or constipation.  Less abdominal distention.    Genitourinary:    Denies any urgency, frequency, hematuria. Voiding  without difficulty with Wilkerson catheter.  Extremities:   Positive for lower extremity swelling//edema.  Neurology:    Denies any headache or focal neurological deficits, Denies generalized weakness or memory difficulty.   Psch:   Denies being anxious or depressed.  Musculoskeletal:    Denies  myalgias, joint complaints or back pain.   Integumentary:   Denies any rashes, ulcers, or excoriations.  Denies  bruising. Hematologic/Lymphatic:  Denies bruising or bleeding. Physical Exam:  No intake/output data recorded. Intake/Output Summary (Last 24 hours) at 3/11/2023 1632  Last data filed at 3/11/2023 0656  Gross per 24 hour   Intake --   Output 750 ml   Net -750 ml   I/O last 3 completed shifts: In: 221.6 [P.O.:120; IV Piggyback:101.6]  Out: 2300 [Urine:2300]  Patient Vitals for the past 96 hrs (Last 3 readings):   Weight   03/09/23 0500 184 lb (83.5 kg)   03/08/23 1000 190 lb 6.4 oz (86.4 kg)     Vital Signs:   Blood pressure 121/62, pulse 83, temperature 98.1 °F (36.7 °C), temperature source Oral, resp. rate 18, height 5' 2\" (1.575 m), weight 184 lb (83.5 kg), SpO2 99 %. General appearance:  Alert, responsive, oriented to person, place, and time. Well preserved, alert, no distress. Head:  Normocephalic. No masses, lesions or tenderness. Eyes:  PERRLA. EOMI. Sclera clear. Buccal mucosa moist.  Impaired vision. ENT:  Ears normal. Mucosa normal.  Neck:    Supple. Trachea midline. No thyromegaly. No JVD. No bruits. Heart:    Rhythm regular. Rate controlled. No murmurs. Lungs:    Symmetrical. Clear to auscultation bilaterally. No wheezes. No rhonchi. No rales. Abdomen:   Softly distended. Tender. Bowel sounds positive. No organomegaly or masses. Gross ascites. Extremities:    Peripheral pulses present. 1-2+ pitting edema the bilateral lower extremities. Edema/swelling of the upper extremities. AV fistula left upper extremity. Neurologic:    Alert x 3. No focal deficit. Cranial nerves grossly intact. No focal weakness. Psych:   Behavior is normal. Mood appears normal. Speech is not rapid and/or pressured. Musculoskeletal:   Spine ROM normal. Muscular strength weak. Gait not assessed. Integumentary:  Patient has new open areas as well as old healing scratches that are crusted over. Patient admits to digging at her skin secondary to itching.   See EMR for full details under skin  Genitalia/Breast:  Wilkerson catheter. Medication:  Scheduled Meds:   albumin human  25 g IntraVENous Q8H    ferric gluconate (FERRLECIT) IVPB  125 mg IntraVENous Daily    cholestyramine  1 packet Oral BID    folic acid  1 mg Oral Daily    colchicine  0.6 mg Oral Every Other Day    levothyroxine  100 mcg Oral Daily    ferrous sulfate  325 mg Oral Once per day on Mon Wed Fri    amLODIPine  20 mg Oral Daily    FLUoxetine  20 mg Oral Daily    hydrALAZINE  20 mg Oral BID    OLANZapine zydis  2.5 mg Oral Nightly    sodium bicarbonate  650 mg Oral BID    vitamin B-12  1,000 mcg Oral Daily    zinc gluconate  50 mg Oral Daily    pantoprazole  40 mg Oral QAM AC    vitamin D  50,000 Units Oral Weekly     Continuous Infusions:   bumetanide 0.1 mg/mL infusion 1 mg/hr (03/11/23 1503)       Objective Data:  Recent Labs     03/09/23  0805 03/10/23  0655 03/11/23  0618   WBC 4.6 4.1* 3.1*   RBC 2.68* 2.47* 2.52*   HGB 7.6* 7.2* 7.3*   HCT 25.5* 22.8* 23.6*   MCV 95.1 92.3 93.7   MCH 28.4 29.1 29.0   MCHC 29.8* 31.6* 30.9*   RDW 14.8 14.7 14.6   PLT 97* 82* 69*   MPV 11.8 11.6 11.6     Recent Labs     03/09/23  0804 03/10/23  0655 03/11/23  0618    138 138   K 3.6 3.7 3.8    102 103   CO2 26 24 24   BUN 54* 56* 59*   CREATININE 5.9* 5.8* 6.0*   GLUCOSE 87 88 79   CALCIUM 7.5* 7.2* 7.3*   PROT 5.9* 5.5* 5.6*   LABALBU 2.6* 2.5* 2.5*   BILITOT 0.4 0.3 0.3   ALKPHOS 147* 133* 125*   AST 21 20 18   ALT 7 7 6     No results found for: TROPONINI       Wound Documentation:   See EMR-patient does have multiple scratches on her body from scratching. Assessment:  Anasarca, probably secondary to progressive end-stage renal disease. Cirrhosis-likely secondary to [facility]  Ascites status post paracentesis performed 3/9/23 with removal of 7150 mL of ascitic fluid. End-stage renal disease with possible hepatorenal syndrome. Normochromic normocytic anemia. Iron deficiency  Essential hypertension.   Hypothyroidism-newly diagnosed with TSH of 11.870  Hyperuricemia  Urticaria  History of depression. Urticaria secondary to liver disease. Plan:   Multiple consultants are following patient. Recommendations have been reviewed. Urticaria improved on Questran - will continue. Nephrology is following and recommendations of been reviewed. No plan for dialysis at this time. Patient to be monitored closely. Continue Bumex drip at this time. Patient did have approximately 1200 mL of urine out the previous 24 hours. Continue strict intake and output. Daily weights. Hemoglobin stable. we will continue to monitor and transfuse as warranted. GI following. PT/OT to evaluate and treat. Continue current therapy. See orders for further plan of care. More than 50% of my  time was spent at the bedside counseling/coordinating care with the patient and/or family with face to face contact. This time was spent reviewing notes and laboratory data as well as instructing and counseling the patient. Time I spent with the family or surrogate(s) is included only if the patient was incapable of providing the necessary information or participating in medical decisions. I also discussed the differential diagnosis and all of the proposed management plans with the patient and individuals accompanying the patient. The patient was seen, examined and then discussed with Dr. Elizabeth Angela. Joan Hawkins, QUINTEN - CARLITO,   3/11/2023  4:32 PM       I saw and evaluated the patient. I agree with the findings and the plan of care as documented in Joan Hawkins NP-C's  note.     Alfonzo White DO, D.O., FACOI  4:42 PM  3/11/2023

## 2023-03-11 NOTE — PLAN OF CARE
Problem: Safety - Adult  Goal: Free from fall injury  3/10/2023 1855 by Ish Marie RN  Outcome: Progressing     Problem: Skin/Tissue Integrity  Goal: Absence of new skin breakdown  Description: 1. Monitor for areas of redness and/or skin breakdown  2. Assess vascular access sites hourly  3. Every 4-6 hours minimum:  Change oxygen saturation probe site  4. Every 4-6 hours:  If on nasal continuous positive airway pressure, respiratory therapy assess nares and determine need for appliance change or resting period.   3/10/2023 1855 by Ish Marie RN  Outcome: Progressing     Problem: Pain  Goal: Verbalizes/displays adequate comfort level or baseline comfort level  3/10/2023 1855 by Ish Marie RN  Outcome: Progressing

## 2023-03-11 NOTE — PROGRESS NOTES
PROGRESS NOTE  By Odalis Gray M.D. [facility]  Dr. Acosta Dixon M.D.,  Dr. Live Weiner M.D.,   Dr. Gerda Curtis D.O.,  Dr. Anurag Hwang M.D.,  Dr. Jason Keys D.O.,          Corky Garibay  74 y.o.  female    SUBJECTIVE:  No new complaints. Son at bedside    OBJECTIVE:    /62   Pulse 76   Temp 98.3 °F (36.8 °C) (Oral)   Resp 17   Ht 5' 2\" (1.575 m)   Wt 184 lb (83.5 kg)   SpO2 97%   BMI 33.65 kg/m²     General: NAD/ female. HEENT: Anicteric sclera/moist oral mucosa  Neck: Supple with trachea midline  Chest: Symmetric excursion/labored respirations  Cor: Regular  Abd.: Soft and nontender  Extr.:  BLE 2-3+ edema  Skin: Warm and dry      DATA:     Lab Results   Component Value Date/Time    WBC 3.1 03/11/2023 06:18 AM    RBC 2.52 03/11/2023 06:18 AM    HGB 7.3 03/11/2023 06:18 AM    HCT 23.6 03/11/2023 06:18 AM    MCV 93.7 03/11/2023 06:18 AM    MCH 29.0 03/11/2023 06:18 AM    MCHC 30.9 03/11/2023 06:18 AM    RDW 14.6 03/11/2023 06:18 AM    PLT 69 03/11/2023 06:18 AM    MPV 11.6 03/11/2023 06:18 AM     Lab Results   Component Value Date/Time     03/11/2023 06:18 AM    K 3.8 03/11/2023 06:18 AM    K 3.9 02/10/2023 07:40 AM     03/11/2023 06:18 AM    CO2 24 03/11/2023 06:18 AM    BUN 59 03/11/2023 06:18 AM    CREATININE 6.0 03/11/2023 06:18 AM    CALCIUM 7.3 03/11/2023 06:18 AM    PROT 5.6 03/11/2023 06:18 AM    LABALBU 2.5 03/11/2023 06:18 AM    BILITOT 0.3 03/11/2023 06:18 AM    ALKPHOS 125 03/11/2023 06:18 AM    AST 18 03/11/2023 06:18 AM    ALT 6 03/11/2023 06:18 AM     No results found for: LIPASE  No results found for: AMYLASE    Ultrasound -images reviewed/report read-no cysts clear evidence of obstruction    ASSESSMENT/PLAN:  Patient Active Problem List   Diagnosis    End stage renal disease ([address]    Encounter regarding vascular access for dialysis for end-stage renal disease ([address]    Acute renal failure (ARF) ([address]     1. Cirrhosis  -Decompensated/nonalcoholic  -MELD cannot be calculated as no INR has been obtained  -Monitor MELD labs  -Ultrasound/Doppler ultrasound reveals patent portal/hepatic veins  -Pending AFP and dedicated imaging     2. Anemia  -Acute on chronic  -Iron deficient  -No evidence of overt bleed  -Consider repeat upper endoscopy as inpatient if further decrease in H&H or overt bleed        3. Ascites  -S/P paracentesis -unknown amount removed as noted is pending  -Ascitic fluid analysis not consistent with SBP  -Defer diuretics to admitting/nephrology      4. Renal disease  -CKD stage IV-V  -Per admitting/pertinent consultants     Above has been discussed with patient and her son at bedside and all questions answered to their satisfaction. They verbalized understanding and agreement with the plan as delineated. Rhys Lanza MD  3/11/2023  9:41 AM    NOTE:  This report was transcribed using voice recognition software. Every effort was made to ensure accuracy; however, inadvertent computerized transcription errors may be present.

## 2023-03-12 LAB
ABO/RH: NORMAL
AFP-TUMOR MARKER: 2 NG/ML (ref 0–9)
ALBUMIN SERPL-MCNC: 3.3 G/DL (ref 3.5–5.2)
ALP BLD-CCNC: 107 U/L (ref 35–104)
ALT SERPL-CCNC: 6 U/L (ref 0–32)
ANION GAP SERPL CALCULATED.3IONS-SCNC: 14 MMOL/L (ref 7–16)
ANTIBODY SCREEN: NORMAL
AST SERPL-CCNC: 16 U/L (ref 0–31)
BASOPHILS ABSOLUTE: 0.02 E9/L (ref 0–0.2)
BASOPHILS RELATIVE PERCENT: 0.9 % (ref 0–2)
BILIRUB SERPL-MCNC: 0.4 MG/DL (ref 0–1.2)
BLOOD BANK DISPENSE STATUS: NORMAL
BLOOD BANK PRODUCT CODE: NORMAL
BODY FLUID CULTURE, STERILE: NORMAL
BPU ID: NORMAL
BUN BLDV-MCNC: 60 MG/DL (ref 6–23)
CALCIUM IONIZED: 1.07 MMOL/L (ref 1.15–1.33)
CALCIUM SERPL-MCNC: 7.5 MG/DL (ref 8.6–10.2)
CHLORIDE BLD-SCNC: 100 MMOL/L (ref 98–107)
CO2: 23 MMOL/L (ref 22–29)
CREAT SERPL-MCNC: 6.1 MG/DL (ref 0.5–1)
DESCRIPTION BLOOD BANK: NORMAL
EOSINOPHILS ABSOLUTE: 0.08 E9/L (ref 0.05–0.5)
EOSINOPHILS RELATIVE PERCENT: 3.5 % (ref 0–6)
GFR SERPL CREATININE-BSD FRML MDRD: 7 ML/MIN/1.73
GLUCOSE BLD-MCNC: 91 MG/DL (ref 74–99)
GRAM STAIN RESULT: NORMAL
HCT VFR BLD CALC: 20.6 % (ref 34–48)
HEMOGLOBIN: 6.5 G/DL (ref 11.5–15.5)
LYMPHOCYTES ABSOLUTE: 0.14 E9/L (ref 1.5–4)
LYMPHOCYTES RELATIVE PERCENT: 6.1 % (ref 20–42)
MAGNESIUM: 2.5 MG/DL (ref 1.6–2.6)
MCH RBC QN AUTO: 29.4 PG (ref 26–35)
MCHC RBC AUTO-ENTMCNC: 31.6 % (ref 32–34.5)
MCV RBC AUTO: 93.2 FL (ref 80–99.9)
MONOCYTES ABSOLUTE: 0.14 E9/L (ref 0.1–0.95)
MONOCYTES RELATIVE PERCENT: 6.1 % (ref 2–12)
NEUTROPHILS ABSOLUTE: 1.99 E9/L (ref 1.8–7.3)
NEUTROPHILS RELATIVE PERCENT: 83.3 % (ref 43–80)
PDW BLD-RTO: 14.5 FL (ref 11.5–15)
PHOSPHORUS: 4.7 MG/DL (ref 2.5–4.5)
PLATELET # BLD: 53 E9/L (ref 130–450)
PLATELET CONFIRMATION: NORMAL
PMV BLD AUTO: 11.9 FL (ref 7–12)
POTASSIUM SERPL-SCNC: 3.8 MMOL/L (ref 3.5–5)
RBC # BLD: 2.21 E12/L (ref 3.5–5.5)
SODIUM BLD-SCNC: 137 MMOL/L (ref 132–146)
TOTAL PROTEIN: 5.8 G/DL (ref 6.4–8.3)
WBC # BLD: 2.4 E9/L (ref 4.5–11.5)

## 2023-03-12 PROCEDURE — 84100 ASSAY OF PHOSPHORUS: CPT

## 2023-03-12 PROCEDURE — 2500000003 HC RX 250 WO HCPCS: Performed by: INTERNAL MEDICINE

## 2023-03-12 PROCEDURE — 83735 ASSAY OF MAGNESIUM: CPT

## 2023-03-12 PROCEDURE — 80053 COMPREHEN METABOLIC PANEL: CPT

## 2023-03-12 PROCEDURE — 1200000000 HC SEMI PRIVATE

## 2023-03-12 PROCEDURE — 85025 COMPLETE CBC W/AUTO DIFF WBC: CPT

## 2023-03-12 PROCEDURE — 6370000000 HC RX 637 (ALT 250 FOR IP): Performed by: HOSPITALIST

## 2023-03-12 PROCEDURE — 2580000003 HC RX 258: Performed by: INTERNAL MEDICINE

## 2023-03-12 PROCEDURE — 36430 TRANSFUSION BLD/BLD COMPNT: CPT

## 2023-03-12 PROCEDURE — 86920 COMPATIBILITY TEST SPIN: CPT

## 2023-03-12 PROCEDURE — 82272 OCCULT BLD FECES 1-3 TESTS: CPT

## 2023-03-12 PROCEDURE — 86901 BLOOD TYPING SEROLOGIC RH(D): CPT

## 2023-03-12 PROCEDURE — 6360000002 HC RX W HCPCS: Performed by: INTERNAL MEDICINE

## 2023-03-12 PROCEDURE — 6370000000 HC RX 637 (ALT 250 FOR IP): Performed by: INTERNAL MEDICINE

## 2023-03-12 PROCEDURE — 86900 BLOOD TYPING SEROLOGIC ABO: CPT

## 2023-03-12 PROCEDURE — 36415 COLL VENOUS BLD VENIPUNCTURE: CPT

## 2023-03-12 PROCEDURE — 6370000000 HC RX 637 (ALT 250 FOR IP): Performed by: NURSE PRACTITIONER

## 2023-03-12 PROCEDURE — P9016 RBC LEUKOCYTES REDUCED: HCPCS

## 2023-03-12 PROCEDURE — 86850 RBC ANTIBODY SCREEN: CPT

## 2023-03-12 PROCEDURE — 82330 ASSAY OF CALCIUM: CPT

## 2023-03-12 PROCEDURE — P9047 ALBUMIN (HUMAN), 25%, 50ML: HCPCS | Performed by: INTERNAL MEDICINE

## 2023-03-12 RX ORDER — CALCIUM GLUCONATE 20 MG/ML
1000 INJECTION, SOLUTION INTRAVENOUS ONCE
Status: COMPLETED | OUTPATIENT
Start: 2023-03-12 | End: 2023-03-12

## 2023-03-12 RX ORDER — POLYETHYLENE GLYCOL 3350 17 G/17G
17 POWDER, FOR SOLUTION ORAL DAILY
Status: DISCONTINUED | OUTPATIENT
Start: 2023-03-12 | End: 2023-03-17 | Stop reason: HOSPADM

## 2023-03-12 RX ORDER — SODIUM CHLORIDE 9 MG/ML
INJECTION, SOLUTION INTRAVENOUS PRN
Status: DISCONTINUED | OUTPATIENT
Start: 2023-03-12 | End: 2023-03-17 | Stop reason: HOSPADM

## 2023-03-12 RX ADMIN — OLANZAPINE 2.5 MG: 5 TABLET, ORALLY DISINTEGRATING ORAL at 21:07

## 2023-03-12 RX ADMIN — HYDRALAZINE HYDROCHLORIDE 20 MG: 10 TABLET, FILM COATED ORAL at 08:56

## 2023-03-12 RX ADMIN — SODIUM BICARBONATE 650 MG: 650 TABLET ORAL at 21:07

## 2023-03-12 RX ADMIN — ALBUMIN (HUMAN) 25 G: 0.25 INJECTION, SOLUTION INTRAVENOUS at 04:40

## 2023-03-12 RX ADMIN — SODIUM CHLORIDE 125 MG: 9 INJECTION, SOLUTION INTRAVENOUS at 12:36

## 2023-03-12 RX ADMIN — BUMETANIDE 1 MG/HR: 0.25 INJECTION, SOLUTION INTRAMUSCULAR; INTRAVENOUS at 11:25

## 2023-03-12 RX ADMIN — Medication 50 MG: at 08:56

## 2023-03-12 RX ADMIN — CALCIUM GLUCONATE 1000 MG: 20 INJECTION, SOLUTION INTRAVENOUS at 16:28

## 2023-03-12 RX ADMIN — PANTOPRAZOLE SODIUM 40 MG: 40 TABLET, DELAYED RELEASE ORAL at 05:26

## 2023-03-12 RX ADMIN — POLYETHYLENE GLYCOL 3350 17 G: 17 POWDER, FOR SOLUTION ORAL at 11:27

## 2023-03-12 RX ADMIN — AMLODIPINE BESYLATE 20 MG: 10 TABLET ORAL at 08:56

## 2023-03-12 RX ADMIN — HYDRALAZINE HYDROCHLORIDE 20 MG: 10 TABLET, FILM COATED ORAL at 21:06

## 2023-03-12 RX ADMIN — LEVOTHYROXINE SODIUM 100 MCG: 100 TABLET ORAL at 05:26

## 2023-03-12 RX ADMIN — CHOLESTYRAMINE 4 G: 4 POWDER, FOR SUSPENSION ORAL at 21:06

## 2023-03-12 RX ADMIN — FLUOXETINE 20 MG: 20 CAPSULE ORAL at 08:56

## 2023-03-12 RX ADMIN — CYANOCOBALAMIN TAB 1000 MCG 1000 MCG: 1000 TAB at 08:56

## 2023-03-12 RX ADMIN — CHOLESTYRAMINE 4 G: 4 POWDER, FOR SUSPENSION ORAL at 08:56

## 2023-03-12 RX ADMIN — SODIUM BICARBONATE 650 MG: 650 TABLET ORAL at 08:56

## 2023-03-12 RX ADMIN — FOLIC ACID 1 MG: 1 TABLET ORAL at 08:57

## 2023-03-12 NOTE — PROGRESS NOTES
PROGRESS NOTE  By Glen Omer M.D. [facility]  Dr. Mendoza Puentes M.D.,  Dr. Kalee Rosas M.D.,   Dr. Johnson Fontanez D.O.,  Dr. Rashard Mclaughlin M.D.,  Dr. Fabiola Perez D.O.,          Calvin Prince  74 y.o.  female    SUBJECTIVE:  Denies abdominal pain. Denies nausea vomiting. Reports still no bowel movement. Son at bedside    OBJECTIVE:    BP (!) 121/51   Pulse 83   Temp 97.9 °F (36.6 °C) (Oral)   Resp 18   Ht 5' 2\" (1.575 m)   Wt 184 lb (83.5 kg)   SpO2 99%   BMI 33.65 kg/m²     General: NAD/ female. AAOx3  HEENT: Anicteric sclera/moist oral mucosa  Neck: Appears supple with trachea midline  Chest: Symmetric excursion/nonlabored respirations  Cor: Regular  Abd.: Soft and nontender  Extr.:  BLE 1-2+ edema  Skin: Warm and dry      DATA:    Monitor data reviewed -atrial fibrillation noted.        Lab Results   Component Value Date/Time    WBC 2.4 03/12/2023 05:48 AM    RBC 2.21 03/12/2023 05:48 AM    HGB 6.5 03/12/2023 05:48 AM    HCT 20.6 03/12/2023 05:48 AM    MCV 93.2 03/12/2023 05:48 AM    MCH 29.4 03/12/2023 05:48 AM    MCHC 31.6 03/12/2023 05:48 AM    RDW 14.5 03/12/2023 05:48 AM    PLT 53 03/12/2023 05:48 AM    MPV 11.9 03/12/2023 05:48 AM     Lab Results   Component Value Date/Time     03/12/2023 05:48 AM    K 3.8 03/12/2023 05:48 AM    K 3.9 02/10/2023 07:40 AM     03/12/2023 05:48 AM    CO2 23 03/12/2023 05:48 AM    BUN 60 03/12/2023 05:48 AM    CREATININE 6.1 03/12/2023 05:48 AM    CALCIUM 7.5 03/12/2023 05:48 AM    PROT 5.8 03/12/2023 05:48 AM    LABALBU 3.3 03/12/2023 05:48 AM    BILITOT 0.4 03/12/2023 05:48 AM    ALKPHOS 107 03/12/2023 05:48 AM    AST 16 03/12/2023 05:48 AM    ALT 6 03/12/2023 05:48 AM     No results found for: LIPASE  No results found for: AMYLASE      ASSESSMENT/PLAN:  Patient Active Problem List   Diagnosis    End stage renal disease ([facility]    Encounter regarding vascular access for dialysis for end-stage renal disease ([facility]    Acute renal failure (ARF) [address]     1. Cirrhosis  -Decompensated/nonalcoholic  -MELD cannot be calculated as no INR has been obtained  -Monitor MELD labs  -Ultrasound/Doppler ultrasound reveals patent portal/hepatic veins  -Pending AFP and dedicated imaging     2. Anemia  -Acute on chronic  -Iron deficient  -No evidence of overt bleed but decreased H&H  -Consider repeat upper endoscopy as inpatient if further decrease in H&H or overt bleed  -Consider nuclear medicine bleeding scan      3. Ascites  -S/P paracentesis -unknown amount removed as noted is pending  -Ascitic fluid analysis not consistent with SBP  -Defer diuretics to admitting/nephrology      4. Renal disease  -CKD stage IV-V  -Per admitting/pertinent consultants     Above has been discussed with patient and her son at bedside and all questions answered to their satisfaction. They verbalized understanding and agreement with the plan as delineated. Jana Duron MD  3/12/2023  11:19 AM    NOTE:  This report was transcribed using voice recognition software. Every effort was made to ensure accuracy; however, inadvertent computerized transcription errors may be present.

## 2023-03-12 NOTE — PROGRESS NOTES
Nephrology Progress Note  3/12/2023 2:33 PM  Subjective:   Admit Date: 3/8/2023  PCP: Sierra Burkett MD    Interval History: Patient was seen in her room. Patient's son was present. Patient was sitting up on a bedside chair. She told me she received packed red cell transfusion earlier this morning. She ate all her breakfast with no nausea vomiting or diarrhea. She denied shortness of breath not on oxygen. No pain. Diet: ADULT DIET; Regular; Low Sodium (2 gm); 1500 ml    Data:     Scheduled Meds:   polyethylene glycol  17 g Oral Daily    cholestyramine  1 packet Oral BID    folic acid  1 mg Oral Daily    colchicine  0.6 mg Oral Every Other Day    levothyroxine  100 mcg Oral Daily    ferrous sulfate  325 mg Oral Once per day on Mon Wed Fri    amLODIPine  20 mg Oral Daily    FLUoxetine  20 mg Oral Daily    hydrALAZINE  20 mg Oral BID    OLANZapine zydis  2.5 mg Oral Nightly    sodium bicarbonate  650 mg Oral BID    vitamin B-12  1,000 mcg Oral Daily    zinc gluconate  50 mg Oral Daily    pantoprazole  40 mg Oral QAM AC    vitamin D  50,000 Units Oral Weekly     Continuous Infusions:   sodium chloride      bumetanide 0.1 mg/mL infusion 1 mg/hr (03/12/23 1125)     PRN Meds:sodium chloride, perflutren lipid microspheres  I/O last 3 completed shifts: In: 320.7 [P.O.:145; IV Piggyback:175.7]  Out: 2150 [Urine:2150]  I/O this shift:   In: 470 [P.O.:120; Blood:350]  Out: -     Intake/Output Summary (Last 24 hours) at 3/12/2023 1433  Last data filed at 3/12/2023 1117  Gross per 24 hour   Intake 790.69 ml   Output 1400 ml   Net -609.31 ml     CBC:   Recent Labs     03/10/23  0655 03/11/23  0618 03/12/23  0548   WBC 4.1* 3.1* 2.4*   HGB 7.2* 7.3* 6.5*   PLT 82* 69* 53*     BMP:    Recent Labs     03/10/23  0655 03/11/23  0618 03/12/23  0548    138 137   K 3.7 3.8 3.8    103 100   CO2 24 24 23   BUN 56* 59* 60*   CREATININE 5.8* 6.0* 6.1*   GLUCOSE 88 79 91     Hepatic:   Recent Labs 03/10/23  0655 03/11/23  0618 03/12/23  0548   AST 20 18 16   ALT 7 6 6   BILITOT 0.3 0.3 0.4   ALKPHOS 133* 125* 107*     Protein/ Albumin:    Lab Results   Component Value Date    LABALBU 3.3 (L) 03/12/2023      Imaging Results          Procedure Component Value Ref Range Date/Time     US GUIDED PARACENTESIS [[id number]] Collected: 03/10/23 1652     Order Status: Completed Updated: 03/10/23 1655     Narrative:       PROCEDURE:   PARACENTESIS WITHOUT IMAGE GUIDANCE     US ABDOMEN LIMITED     3/9/2023     HISTORY:   ORDERING SYSTEM PROVIDED HISTORY: ascites   TECHNOLOGIST PROVIDED HISTORY:   Reason for exam:->ascites     TECHNIQUE:   Informed consent was obtained after a detailed explanation of the procedure   including risks, benefits, and alternatives. Universal protocol was   followed. A limited ultrasound of the abdomen was performed. The right   abdomen was prepped and draped in sterile fashion and local anesthesia was   achieved with lidocaine. An 8 Icelandic needle sheath was advanced into ascites   and paracentesis was performed. The patient tolerated the procedure well. FINDINGS:   Limited ultrasound of the abdomen demonstrates ascites. A total of 7150 cc   was removed. Impression:       Successful paracentesis. US DUP ABD PEL RETRO SCROT LIMITED [[id number]] Collected: 03/10/23 1614     Order Status: Completed Updated: 03/10/23 1618     Narrative:       EXAMINATION:   DOPPLER EVALUATION OF THE PELVIS     3/10/2023 1:26 pm     COMPARISON:   None. HISTORY:   ORDERING SYSTEM PROVIDED HISTORY: Portal hepatic veins/cirrhosis   TECHNOLOGIST PROVIDED HISTORY:   Reason for exam:->Portal hepatic veins/cirrhosis   What reading provider will be dictating this exam?->CRC     FINDINGS:   Cirrhotic liver. Moderate perihepatic ascites. Borderline gallbladder wall   thickening up to 0.4 cm, which could relate to chronic liver disease. No   shadowing gallstone. Negative sonographic Munson's sign.   No significant   bile duct dilation with the common duct is 0.7 cm. Pancreas not well   visualized on this study. Right kidney grossly unremarkable. Patent portal   vein, main hepatic artery, and hepatic veins. Impression:       Cirrhotic liver. Ascites. US LIVER [[id number]] Collected: 03/10/23 1606     Order Status: Completed Updated: 03/10/23 1617     Narrative:       EXAMINATION:   RIGHT UPPER QUADRANT ULTRASOUND     3/10/2023 1:25 pm     COMPARISON:   None. HISTORY:   ORDERING SYSTEM PROVIDED HISTORY: Cirrhosis/rule out mass   TECHNOLOGIST PROVIDED HISTORY:     Reason for exam:->Cirrhosis/rule out mass   What reading provider will be dictating this exam?->CRC     FINDINGS:   LIVER:  Cirrhotic liver. BILIARY SYSTEM:  Borderline gallbladder wall thickening 0.4 cm, which could   relate to chronic liver disease. No shadowing gallstone. Negative   sonographic Munson sign. Common bile duct is within normal limits measuring 0.7 cm. RIGHT KIDNEY: The right kidney is grossly unremarkable without evidence of   hydronephrosis. PANCREAS:  Pancreas not well visualized on this study. OTHER: Moderate perihepatic ascites. Portal vein patent. Hepatic artery   patent. Patent hepatic veins. Impression:       Cirrhotic liver. Ascites. IR US GUIDED PARACENTESIS [[id number]]      Order Status: Canceled      US RETROPERITONEAL COMPLETE [[id number]] Collected: 03/08/23 1506     Order Status: Completed Updated: 03/08/23 1510     Narrative:       EXAMINATION:   RETROPERITONEAL ULTRASOUND OF THE KIDNEYS AND URINARY BLADDER     3/8/2023     COMPARISON:   None     HISTORY:   ORDERING SYSTEM PROVIDED HISTORY: MARY   TECHNOLOGIST PROVIDED HISTORY:     Reason for exam:->MARY   What reading provider will be dictating this exam?->CRC     FINDINGS:     Kidneys: The right kidney measures 10.7 cm in length and the left kidney measures 11.7   cm  in length.      Bilateral hyperechoic kidneys. No evidence of hydronephrosis or intrarenal   stones. Multiple bilateral renal cysts with the largest measuring 2.7 cm   involving the mid zone of the left kidney. Bladder:     Wilkerson catheter in a decompressed urinary bladder. MISCELLANEOUS: Large volume ascites      Impression:       Multiple bilateral renal cysts with the largest measuring 2.7 cm. Large volume ascites. Bilateral hyperechoic kidneys. ECHO 3/09/2023    Normal left ventricular chamber size. Normal left ventricular systolic function, EF 64%. Mild left ventricular concentric hypertrophy noted. Stage II diastolic dysfunction. Left atrium is enlarged. Increased left atrial volume. Interatrial septum not well visualized but appears intact. Normal right ventricle size and function. There is mild mitral regurgitation. No mitral valve prolapse. Mild aortic stenosis - Mean gradient 18mmHg, peak 31mmHg, SELINA 1.7cm2 by   VTI, 1.6cm2 by Vmax, DLI 0.52. Mild aortic regurgitation, pressure 1/2 time 484ms. There is mild tricuspid regurgitation. Mild pulmonary hypertension, RVSP 45mmHg. Normal aortic root size. No evidence of pericardial effusion. Moderate to large pleural effusion. No intra cardiac mass or thrombus. No previous echo for comparison. Objective:     Vitals: BP (!) 118/49   Pulse 80   Temp 98.4 °F (36.9 °C) (Oral)   Resp 18   Ht 5' 2\" (1.575 m)   Wt 184 lb (83.5 kg)   SpO2 99%   BMI 33.65 kg/m²   General appearance: Sitting up on a bedside chair. Awake alert and oriented not in acute distress. Patient's son was present in the room. Patient was not wearing oxygen. Lungs: Slightly diminished air movement both lung bases  Heart: No S3, No rub  Abdomen: Distended (ascites) lax and soft. No tenderness. L. Extremities: +1 bilateral equal edema    Assessment & Plan:     Stage V chronic kidney disease not on kidney replacement therapy yet.   The patient has an immature left upper arm fistula scheduled for surgical revision on April 6 I did explain to her again today that at any point if she develops uremic symptoms we will insert a right IJ tunneled dialysis catheter to initiate hemodialysis. I also explained to her that the optimal scenario would be to wait until her arm access is ready to be used and try to avoid a tunneled dialysis catheter placement. She expressed understanding. Her son also expressed understanding. Hypervolemia: In the context of advanced chronic kidney disease stage V and liver cirrhosis on imaging. She is status post 7 L paracentesis fluid removal.  She is on Bumex drip with acceptable urine output. Hypocalcemia: Mild. Supplemented. Patient has secondary hyperparathyroidism of kidney disease and she has severe hypovitaminosis D on supplementation    Hyperphosphatemia: Mild. Reflecting advanced stage V CKD. Phosphorus is 5.5 or above I will add phosphate binder. Anemia: Severe: Appreciate hematology service input. Likely of advanced stage V CKD. Transfuse as needed    Thrombocytopenia: In the context of liver disease. Patient is followed by hematology service    Elevated TSH: Followed by primary service      This note was created using voice recognition software.     Electronically signed by Alisson Carlson MD on 3/12/2023 at 2:33 PM

## 2023-03-12 NOTE — PROGRESS NOTES
Progress Note    Subjective:  Patient weak and fatigued. Reading stable. No chest pain or pressure. Objective:    BP (!) 125/48   Pulse 75   Temp 97.7 °F (36.5 °C) (Oral)   Resp 18   Ht 5' 2\" (1.575 m)   Wt 184 lb (83.5 kg)   SpO2 99%   BMI 33.65 kg/m²     General: Pleasant woman awake alert no apparent distress  HEENT: Anicteric sclera, oropharynx is clear, no mucositis or thrush  Heart:  RRR, no murmurs, gallops, or rubs. Lungs:  CTA bilaterally, no wheeze, rales or rhonchi  Abd: bowel sounds present, nontender, slightly distended. Extrem: Warm. Pulses intact  CBC:   Lab Results   Component Value Date/Time    WBC 2.4 03/12/2023 05:48 AM    RBC 2.21 03/12/2023 05:48 AM    HGB 6.5 03/12/2023 05:48 AM    HCT 20.6 03/12/2023 05:48 AM    MCV 93.2 03/12/2023 05:48 AM    MCH 29.4 03/12/2023 05:48 AM    MCHC 31.6 03/12/2023 05:48 AM    RDW 14.5 03/12/2023 05:48 AM    PLT 53 03/12/2023 05:48 AM    MPV 11.9 03/12/2023 05:48 AM     CMP:    Lab Results   Component Value Date/Time     03/12/2023 05:48 AM    K 3.8 03/12/2023 05:48 AM    K 3.9 02/10/2023 07:40 AM     03/12/2023 05:48 AM    CO2 23 03/12/2023 05:48 AM    BUN 60 03/12/2023 05:48 AM    CREATININE 6.1 03/12/2023 05:48 AM    LABGLOM 7 03/12/2023 05:48 AM    GLUCOSE 91 03/12/2023 05:48 AM    PROT 5.8 03/12/2023 05:48 AM    LABALBU 3.3 03/12/2023 05:48 AM    CALCIUM 7.5 03/12/2023 05:48 AM    BILITOT 0.4 03/12/2023 05:48 AM    ALKPHOS 107 03/12/2023 05:48 AM    AST 16 03/12/2023 05:48 AM    ALT 6 03/12/2023 05:48 AM            US DUP ABD PEL RETRO SCROT LIMITED   Final Result   Cirrhotic liver. Ascites. US LIVER   Final Result   Cirrhotic liver. Ascites. US GUIDED PARACENTESIS   Final Result   Successful paracentesis. US RETROPERITONEAL COMPLETE   Final Result   Multiple bilateral renal cysts with the largest measuring 2.7 cm. Large volume ascites. Bilateral hyperechoic kidneys.                Current Facility-Administered Medications:     0.9 % sodium chloride infusion, , IntraVENous, PRN, Rj Wayne DO    polyethylene glycol (GLYCOLAX) packet 17 g, 17 g, Oral, Daily, Patience Hatch MD, 17 g at 03/12/23 1127    calcium gluconate 1,000 mg in sodium chloride 50 mL, 1,000 mg, IntraVENous, Once, Saintclair Rook, MD    cholestyramine (QUESTRAN) packet 4 g, 1 packet, Oral, BID, Rj Wayne DO, 4 g at 44/99/82 2153    folic acid (FOLVITE) tablet 1 mg, 1 mg, Oral, Daily, QUINTEN Corrigan - CNP, 1 mg at 03/12/23 0857    colchicine (COLCRYS) tablet 0.6 mg, 0.6 mg, Oral, Every Other Day, Olamide Wayne, DO, 0.6 mg at 03/11/23 0940    levothyroxine (SYNTHROID) tablet 100 mcg, 100 mcg, Oral, Daily, Gabrielaerene Chay Vazquezel, DO, 100 mcg at 03/12/23 0526    ferrous sulfate (IRON 325) tablet 325 mg, 325 mg, Oral, Once per day on Mon Wed Fri, Rj Wayne DO, 325 mg at 03/10/23 0809    amLODIPine (NORVASC) tablet 20 mg, 20 mg, Oral, Daily, Rj Wayne, DO, 20 mg at 03/12/23 0856    FLUoxetine (PROZAC) capsule 20 mg, 20 mg, Oral, Daily, Rj Wayne, DO, 20 mg at 03/12/23 0856    hydrALAZINE (APRESOLINE) tablet 20 mg, 20 mg, Oral, BID, Rj Vazquezel, DO, 20 mg at 03/12/23 0856    OLANZapine zydis (ZYPREXA) disintegrating tablet 2.5 mg, 2.5 mg, Oral, Nightly, Rj Wayne DO, 2.5 mg at 03/11/23 2119    sodium bicarbonate tablet 650 mg, 650 mg, Oral, BID, Olamide Vazquezel, DO, 650 mg at 03/12/23 3882    vitamin B-12 (CYANOCOBALAMIN) tablet 1,000 mcg, 1,000 mcg, Oral, Daily, Rj Wayne DO, 1,000 mcg at 03/12/23 0856    zinc gluconate tablet 50 mg, 50 mg, Oral, Daily, Rj Wayne DO, 50 mg at 03/12/23 0856    bumetanide (BUMEX) 12.5 mg in sodium chloride 0.9 % 125 mL infusion, 1 mg/hr, IntraVENous, Continuous, Rj Wayne DO, Last Rate: 10 mL/hr at 03/12/23 1236, 1 mg/hr at 03/12/23 1236    pantoprazole (PROTONIX) tablet 40 mg, 40 mg, Oral, [facility], Rj Wayne DO, 40 mg at 03/12/23 3792 perflutren lipid microspheres (DEFINITY) injection 1.5 mL, 1.5 mL, IntraVENous, ONCE PRN, Rj Wayne DO    vitamin D (ERGOCALCIFEROL) capsule 50,000 Units, 50,000 Units, Oral, Weekly, Levon Quintero DO [place], 50,000 Units at 03/09/23 0506    Assessment:    Principal Problem:    Acute renal failure (ARF) (HCC)  Resolved Problems:    * No resolved hospital problems. *      68 yo female with multifactorial anemia in setting of CKD stage IV (IgA nephropathy), iron deficiency, B12 deficiency, +Intrinsic factor Ab, pernicious anemia. Also hx of liver cirrhosis. She was treated with IV iron and recently started on VIKKI on 1/16/23 and given 1 dose, missed 2nd dose on 2/23/23. Admitted with volume overload and acute on CKD. S/p paracentesis of 7150 cc fluid. Plan:  -She is receiving a total of 3 doses of IV Ferrlecit. Once iron stores are corrected we will start VIKKI outpatient.  - Transfuse 1 unit PRBCs today. Maintain hemoglobin above 7.  - GI also following along. If she continues to have decreasing hemoglobin, she may require EGD while in the hospital.  - Continue diuresis as per nephrology and primary team.  - Continue folic acid 1 mg p.o. daily. - Continue vitamin B12 p.o. daily.         Electronically signed by Sharath Luna MD on 3/12/2023 at 4:28 PM

## 2023-03-12 NOTE — PROGRESS NOTES
Internal Medicine Progress Note    [facility]    Carmen Tobin. Luis Bianchi, CHRIS.IVAN.CNydiaONydiaINydia Velazquez D.O., ISHMAELOGALINDO Maxwell D.O. Darden Roup, MSN, APRN, NP-C  Mendoza Burton. Ant Angeles, MSN, APRN-CNP     Primary Care Physician: Nelson Guevara MD   Admitting Physician:  Brittany Tripp DO  Admission date and time: 3/8/2023  9:59 AM    Room:  [facility]0039-23  Admitting diagnosis: Acute renal failure (ARF) [facility]) [N17.9]    Patient Name: Ludwig Woodward  MRN: [id number]    Date of Service: 3/12/2023     Subjective:  Greg Frausto is a 74 y.o. female who was seen and examined today,3/12/2023, at the bedside. Patient was resting in bed. States he feels a little better each day. Currently receiving 1 unit packed red blood cells. Tolerating infusion. Tolerating iron infusions as well. Abdominal distention stable. States itching is improved on Questran. Abdominal discomfort at the right paracentesis site has resolved. Delfina King, son, present during my examination. Review of System:   Constitutional:   Denies fever or chills, weight loss or gain, positive for fatigue/malaise-improving  HEENT:   Denies ear pain, sore throat, sinus or eye problems. Cardiovascular:   Denies any chest pain, irregular heartbeats, or palpitations. Respiratory:   Denies shortness of breath, coughing, sputum production, hemoptysis, or wheezing. Gastrointestinal:   Denies nausea, vomiting, diarrhea, or constipation. Less abdominal distention. Genitourinary:    Denies any urgency, frequency, hematuria. Voiding  without difficulty with Wilkerson catheter. Extremities:   Positive for lower extremity swelling//edema-improved  Neurology:    Denies any headache or focal neurological deficits, Denies generalized weakness or memory difficulty. Psch:   Denies being anxious or depressed.   Musculoskeletal:    Denies  myalgias, joint complaints or back pain.   Integumentary:   Denies any rashes, ulcers, or excoriations. Denies bruising. Hematologic/Lymphatic:  Denies bruising or bleeding. Physical Exam:  I/O this shift: In: 470 [P.O.:120; Blood:350]  Out: -     Intake/Output Summary (Last 24 hours) at 3/12/2023 1453  Last data filed at 3/12/2023 1117  Gross per 24 hour   Intake 790.69 ml   Output 1400 ml   Net -609.31 ml   I/O last 3 completed shifts: In: 320.7 [P.O.:145; IV Piggyback:175.7]  Out: 2150 [Urine:2150]  Patient Vitals for the past 96 hrs (Last 3 readings):   Weight   03/09/23 0500 184 lb (83.5 kg)     Vital Signs:   Blood pressure (!) 118/49, pulse 80, temperature 98.4 °F (36.9 °C), temperature source Oral, resp. rate 18, height 5' 2\" (1.575 m), weight 184 lb (83.5 kg), SpO2 99 %. General appearance:  Alert, responsive, oriented to person, place, and time. Well preserved, alert, no distress. Head:  Normocephalic. No masses, lesions or tenderness. Eyes:  PERRLA. EOMI. Sclera clear. Buccal mucosa moist.  Impaired vision. ENT:  Ears normal. Mucosa normal.  Neck:    Supple. Trachea midline. No thyromegaly. No JVD. No bruits. Heart:    Rhythm regular. Rate controlled. No murmurs. Lungs:    Symmetrical. Clear to auscultation bilaterally. No wheezes. No rhonchi. No rales. Abdomen:   Softly distended. Bowel sounds positive. No organomegaly or masses. Gross ascites. Extremities:    Peripheral pulses present. 1+ pitting edema the bilateral lower extremities. Edema/swelling of the upper extremities. AV fistula left upper extremity. Neurologic:    Alert x 3. No focal deficit. Cranial nerves grossly intact. No focal weakness. Psych:   Behavior is normal. Mood appears normal. Speech is not rapid and/or pressured. Musculoskeletal:   Spine ROM normal. Muscular strength weak. Gait not assessed. Integumentary:  Patient has new open areas as well as old healing scratches that are crusted over.   Patient admits to digging at her skin secondary to itching. See EMR for full details under skin  Genitalia/Breast:  Wilkerson catheter. Medication:  Scheduled Meds:   polyethylene glycol  17 g Oral Daily    calcium gluconate  1,000 mg IntraVENous Once    cholestyramine  1 packet Oral BID    folic acid  1 mg Oral Daily    colchicine  0.6 mg Oral Every Other Day    levothyroxine  100 mcg Oral Daily    ferrous sulfate  325 mg Oral Once per day on Mon Wed Fri    amLODIPine  20 mg Oral Daily    FLUoxetine  20 mg Oral Daily    hydrALAZINE  20 mg Oral BID    OLANZapine zydis  2.5 mg Oral Nightly    sodium bicarbonate  650 mg Oral BID    vitamin B-12  1,000 mcg Oral Daily    zinc gluconate  50 mg Oral Daily    pantoprazole  40 mg Oral QAM AC    vitamin D  50,000 Units Oral Weekly     Continuous Infusions:   sodium chloride      bumetanide 0.1 mg/mL infusion 1 mg/hr (03/12/23 1125)       Objective Data:  Recent Labs     03/10/23  0655 03/11/23  0618 03/12/23  0548   WBC 4.1* 3.1* 2.4*   RBC 2.47* 2.52* 2.21*   HGB 7.2* 7.3* 6.5*   HCT 22.8* 23.6* 20.6*   MCV 92.3 93.7 93.2   MCH 29.1 29.0 29.4   MCHC 31.6* 30.9* 31.6*   RDW 14.7 14.6 14.5   PLT 82* 69* 53*   MPV 11.6 11.6 11.9     Recent Labs     03/10/23  0655 03/11/23  0618 03/12/23  0548    138 137   K 3.7 3.8 3.8    103 100   CO2 24 24 23   BUN 56* 59* 60*   CREATININE 5.8* 6.0* 6.1*   GLUCOSE 88 79 91   CALCIUM 7.2* 7.3* 7.5*   PROT 5.5* 5.6* 5.8*   LABALBU 2.5* 2.5* 3.3*   BILITOT 0.3 0.3 0.4   ALKPHOS 133* 125* 107*   AST 20 18 16   ALT 7 6 6     No results found for: TROPONINI       Wound Documentation:   See EMR-patient does have multiple scratches on her body from scratching. Assessment:  Anasarca, probably secondary to progressive end-stage renal disease. Cirrhosis-likely secondary to [facility]  Ascites status post paracentesis performed 3/9/23 with removal of 7150 mL of ascitic fluid. End-stage renal disease with possible hepatorenal syndrome.   Acute on chronic normochromic normocytic anemia requiring transfusion this admission. Iron deficiency  Essential hypertension. Hypothyroidism-newly diagnosed with TSH of 11.870  Hyperuricemia  Urticaria  History of depression. Urticaria secondary to liver disease. Plan:   Multiple consultants are following patient. Recommendations have been reviewed. Continue Questran for urticaria. Nephrology is following and recommendations of been reviewed. No plan for dialysis at this time. Patient to be monitored closely. Patient's hemoglobin was found to be 6.5 today. Patient did receive 1 unit packed red blood cells. GI is following the patient. As per note review they will consider repeat upper endoscopic examination if further decrease in H&H is noted or overt bleeding. May also consider nuclear medicine bleeding scan. Continue Bumex drip at this time. Patient did have approximately 1400 mL of urine out from 7 PM to 7 AM.  Yesterday's output is incomplete. Continue strict intake and output. Daily weights. PT/OT to evaluate and treat. Continue current therapy. See orders for further plan of care. More than 50% of my  time was spent at the bedside counseling/coordinating care with the patient and/or family with face to face contact. This time was spent reviewing notes and laboratory data as well as instructing and counseling the patient. Time I spent with the family or surrogate(s) is included only if the patient was incapable of providing the necessary information or participating in medical decisions. I also discussed the differential diagnosis and all of the proposed management plans with the patient and individuals accompanying the patient. The patient was seen, examined and then discussed with Dr. Janae Ojeda. QUINTEN Esquivel - CARLITO,   3/12/2023  2:53 PM       I saw and evaluated the patient. I agree with the findings and the plan of care as documented in Merle Gaston NP-C's  note.     Shaina Cardoza DO, D.O., [facility]  3:05 PM  3/12/2023

## 2023-03-13 LAB
ALBUMIN SERPL-MCNC: 3.1 G/DL (ref 3.5–5.2)
ALP BLD-CCNC: 116 U/L (ref 35–104)
ALT SERPL-CCNC: 7 U/L (ref 0–32)
ANION GAP SERPL CALCULATED.3IONS-SCNC: 14 MMOL/L (ref 7–16)
AST SERPL-CCNC: 19 U/L (ref 0–31)
BASOPHILS ABSOLUTE: 0.01 E9/L (ref 0–0.2)
BASOPHILS RELATIVE PERCENT: 0.2 % (ref 0–2)
BILIRUB SERPL-MCNC: 0.5 MG/DL (ref 0–1.2)
BUN BLDV-MCNC: 62 MG/DL (ref 6–23)
CALCIUM IONIZED: 1.11 MMOL/L (ref 1.15–1.33)
CALCIUM SERPL-MCNC: 7.8 MG/DL (ref 8.6–10.2)
CHLORIDE BLD-SCNC: 100 MMOL/L (ref 98–107)
CO2: 23 MMOL/L (ref 22–29)
CREAT SERPL-MCNC: 6.1 MG/DL (ref 0.5–1)
EOSINOPHILS ABSOLUTE: 0.07 E9/L (ref 0.05–0.5)
EOSINOPHILS RELATIVE PERCENT: 1.7 % (ref 0–6)
GFR SERPL CREATININE-BSD FRML MDRD: 7 ML/MIN/1.73
GLUCOSE BLD-MCNC: 76 MG/DL (ref 74–99)
HBA1C MFR BLD: 4.2 %
HCT VFR BLD CALC: 24.9 % (ref 34–48)
HEMOGLOBIN: 8.1 G/DL (ref 11.5–15.5)
IMMATURE GRANULOCYTES #: 0.01 E9/L
IMMATURE GRANULOCYTES %: 0.2 % (ref 0–5)
LYMPHOCYTES ABSOLUTE: 0.32 E9/L (ref 1.5–4)
LYMPHOCYTES RELATIVE PERCENT: 7.6 % (ref 20–42)
MAGNESIUM: 2.4 MG/DL (ref 1.6–2.6)
MCH RBC QN AUTO: 29.7 PG (ref 26–35)
MCHC RBC AUTO-ENTMCNC: 32.5 % (ref 32–34.5)
MCV RBC AUTO: 91.2 FL (ref 80–99.9)
MONOCYTES ABSOLUTE: 0.35 E9/L (ref 0.1–0.95)
MONOCYTES RELATIVE PERCENT: 8.3 % (ref 2–12)
NEUTROPHILS ABSOLUTE: 3.47 E9/L (ref 1.8–7.3)
NEUTROPHILS RELATIVE PERCENT: 82 % (ref 43–80)
OCCULT BLOOD DIAGNOSTIC: NORMAL
PDW BLD-RTO: 14.4 FL (ref 11.5–15)
PHOSPHORUS: 5 MG/DL (ref 2.5–4.5)
PLATELET # BLD: 58 E9/L (ref 130–450)
PLATELET CONFIRMATION: NORMAL
PMV BLD AUTO: 12.7 FL (ref 7–12)
POTASSIUM SERPL-SCNC: 4 MMOL/L (ref 3.5–5)
RBC # BLD: 2.73 E12/L (ref 3.5–5.5)
SODIUM BLD-SCNC: 137 MMOL/L (ref 132–146)
TOTAL PROTEIN: 5.7 G/DL (ref 6.4–8.3)
WBC # BLD: 4.2 E9/L (ref 4.5–11.5)

## 2023-03-13 PROCEDURE — 97110 THERAPEUTIC EXERCISES: CPT | Performed by: PHYSICAL THERAPIST

## 2023-03-13 PROCEDURE — C9113 INJ PANTOPRAZOLE SODIUM, VIA: HCPCS | Performed by: NURSE PRACTITIONER

## 2023-03-13 PROCEDURE — P9047 ALBUMIN (HUMAN), 25%, 50ML: HCPCS | Performed by: HOSPITALIST

## 2023-03-13 PROCEDURE — 97535 SELF CARE MNGMENT TRAINING: CPT

## 2023-03-13 PROCEDURE — 36415 COLL VENOUS BLD VENIPUNCTURE: CPT

## 2023-03-13 PROCEDURE — 85025 COMPLETE CBC W/AUTO DIFF WBC: CPT

## 2023-03-13 PROCEDURE — 2500000003 HC RX 250 WO HCPCS: Performed by: INTERNAL MEDICINE

## 2023-03-13 PROCEDURE — 6360000002 HC RX W HCPCS: Performed by: NURSE PRACTITIONER

## 2023-03-13 PROCEDURE — 6370000000 HC RX 637 (ALT 250 FOR IP): Performed by: HOSPITALIST

## 2023-03-13 PROCEDURE — 83735 ASSAY OF MAGNESIUM: CPT

## 2023-03-13 PROCEDURE — 6370000000 HC RX 637 (ALT 250 FOR IP): Performed by: NURSE PRACTITIONER

## 2023-03-13 PROCEDURE — 80053 COMPREHEN METABOLIC PANEL: CPT

## 2023-03-13 PROCEDURE — 97530 THERAPEUTIC ACTIVITIES: CPT

## 2023-03-13 PROCEDURE — 1200000000 HC SEMI PRIVATE

## 2023-03-13 PROCEDURE — 2580000003 HC RX 258: Performed by: INTERNAL MEDICINE

## 2023-03-13 PROCEDURE — 89051 BODY FLUID CELL COUNT: CPT

## 2023-03-13 PROCEDURE — 82330 ASSAY OF CALCIUM: CPT

## 2023-03-13 PROCEDURE — 6360000002 HC RX W HCPCS: Performed by: HOSPITALIST

## 2023-03-13 PROCEDURE — 84100 ASSAY OF PHOSPHORUS: CPT

## 2023-03-13 PROCEDURE — 97530 THERAPEUTIC ACTIVITIES: CPT | Performed by: PHYSICAL THERAPIST

## 2023-03-13 PROCEDURE — 6370000000 HC RX 637 (ALT 250 FOR IP): Performed by: INTERNAL MEDICINE

## 2023-03-13 RX ORDER — CALCIUM GLUCONATE 20 MG/ML
1000 INJECTION, SOLUTION INTRAVENOUS ONCE
Status: COMPLETED | OUTPATIENT
Start: 2023-03-13 | End: 2023-03-14

## 2023-03-13 RX ORDER — PANTOPRAZOLE SODIUM 40 MG/10ML
40 INJECTION, POWDER, LYOPHILIZED, FOR SOLUTION INTRAVENOUS 2 TIMES DAILY
Status: DISCONTINUED | OUTPATIENT
Start: 2023-03-13 | End: 2023-03-17 | Stop reason: HOSPADM

## 2023-03-13 RX ORDER — BUMETANIDE 0.25 MG/ML
2 INJECTION INTRAMUSCULAR; INTRAVENOUS EVERY 8 HOURS
Status: DISCONTINUED | OUTPATIENT
Start: 2023-03-13 | End: 2023-03-17 | Stop reason: HOSPADM

## 2023-03-13 RX ORDER — ALBUMIN (HUMAN) 12.5 G/50ML
50 SOLUTION INTRAVENOUS ONCE AS NEEDED
Status: DISCONTINUED | OUTPATIENT
Start: 2023-03-14 | End: 2023-03-17 | Stop reason: HOSPADM

## 2023-03-13 RX ORDER — ALBUMIN (HUMAN) 12.5 G/50ML
50 SOLUTION INTRAVENOUS ONCE
Status: COMPLETED | OUTPATIENT
Start: 2023-03-13 | End: 2023-03-13

## 2023-03-13 RX ADMIN — BUMETANIDE 1 MG/HR: 0.25 INJECTION, SOLUTION INTRAMUSCULAR; INTRAVENOUS at 12:51

## 2023-03-13 RX ADMIN — ALBUMIN (HUMAN) 50 G: 0.25 INJECTION, SOLUTION INTRAVENOUS at 11:31

## 2023-03-13 RX ADMIN — COLCHICINE 0.6 MG: 0.6 TABLET, FILM COATED ORAL at 08:53

## 2023-03-13 RX ADMIN — BUMETANIDE 2 MG: 0.25 INJECTION INTRAMUSCULAR; INTRAVENOUS at 16:57

## 2023-03-13 RX ADMIN — SODIUM BICARBONATE 650 MG: 650 TABLET ORAL at 23:51

## 2023-03-13 RX ADMIN — PANTOPRAZOLE SODIUM 40 MG: 40 TABLET, DELAYED RELEASE ORAL at 06:07

## 2023-03-13 RX ADMIN — CALCIUM GLUCONATE 1000 MG: 20 INJECTION, SOLUTION INTRAVENOUS at 17:35

## 2023-03-13 RX ADMIN — Medication 50 MG: at 08:53

## 2023-03-13 RX ADMIN — FLUOXETINE 20 MG: 20 CAPSULE ORAL at 08:53

## 2023-03-13 RX ADMIN — SODIUM BICARBONATE 650 MG: 650 TABLET ORAL at 08:53

## 2023-03-13 RX ADMIN — HYDRALAZINE HYDROCHLORIDE 20 MG: 10 TABLET, FILM COATED ORAL at 08:52

## 2023-03-13 RX ADMIN — CHOLESTYRAMINE 4 G: 4 POWDER, FOR SUSPENSION ORAL at 08:52

## 2023-03-13 RX ADMIN — LEVOTHYROXINE SODIUM 100 MCG: 100 TABLET ORAL at 06:07

## 2023-03-13 RX ADMIN — POLYETHYLENE GLYCOL 3350 17 G: 17 POWDER, FOR SOLUTION ORAL at 08:52

## 2023-03-13 RX ADMIN — OLANZAPINE 2.5 MG: 5 TABLET, ORALLY DISINTEGRATING ORAL at 23:51

## 2023-03-13 RX ADMIN — FERROUS SULFATE TAB 325 MG (65 MG ELEMENTAL FE) 325 MG: 325 (65 FE) TAB at 08:53

## 2023-03-13 RX ADMIN — FOLIC ACID 1 MG: 1 TABLET ORAL at 08:53

## 2023-03-13 RX ADMIN — CYANOCOBALAMIN TAB 1000 MCG 1000 MCG: 1000 TAB at 08:53

## 2023-03-13 RX ADMIN — BUMETANIDE 1 MG/HR: 0.25 INJECTION, SOLUTION INTRAMUSCULAR; INTRAVENOUS at 00:22

## 2023-03-13 RX ADMIN — PANTOPRAZOLE SODIUM 40 MG: 40 INJECTION, POWDER, FOR SOLUTION INTRAVENOUS at 23:51

## 2023-03-13 RX ADMIN — AMLODIPINE BESYLATE 20 MG: 10 TABLET ORAL at 08:53

## 2023-03-13 ASSESSMENT — PAIN SCALES - GENERAL: PAINLEVEL_OUTOF10: 0

## 2023-03-13 NOTE — PROGRESS NOTES
Son told this nurse per Dr. Nhung Bateman that valles catheter is needed for strict management of I&O for proper patient plan of care. Will monitor.

## 2023-03-13 NOTE — PROGRESS NOTES
Nephrology Progress Note  3/13/2023 3:22 PM  Subjective:   Admit Date: 3/8/2023  PCP: Eufemia Garcia MD    Interval History: Patient was seen in her room.  Patient's son was present.  Patient was lying comfortably in bed.  No pain and no shortness of breath.  No nausea.  Good appetite.      Diet: ADULT DIET; Regular; Low Sodium (2 gm); 1500 ml    Data:     Scheduled Meds:   pantoprazole  40 mg IntraVENous BID    polyethylene glycol  17 g Oral Daily    cholestyramine  1 packet Oral BID    folic acid  1 mg Oral Daily    colchicine  0.6 mg Oral Every Other Day    levothyroxine  100 mcg Oral Daily    ferrous sulfate  325 mg Oral Once per day on Mon Wed Fri    amLODIPine  20 mg Oral Daily    FLUoxetine  20 mg Oral Daily    hydrALAZINE  20 mg Oral BID    OLANZapine zydis  2.5 mg Oral Nightly    sodium bicarbonate  650 mg Oral BID    vitamin B-12  1,000 mcg Oral Daily    zinc gluconate  50 mg Oral Daily    [Held by provider] pantoprazole  40 mg Oral QAM AC    vitamin D  50,000 Units Oral Weekly     Continuous Infusions:   sodium chloride      bumetanide 0.1 mg/mL infusion 1 mg/hr (03/13/23 1251)     PRN Meds:[START ON 3/14/2023] albumin human, sodium chloride, perflutren lipid microspheres  I/O last 3 completed shifts:  In: 890.7 [P.O.:265; Blood:350; IV Piggyback:275.7]  Out: 2350 [Urine:2350]  No intake/output data recorded.    Intake/Output Summary (Last 24 hours) at 3/13/2023 1522  Last data filed at 3/13/2023 0520  Gross per 24 hour   Intake --   Output 950 ml   Net -950 ml     CBC:   Recent Labs     03/11/23  0618 03/12/23  0548 03/13/23  0515   WBC 3.1* 2.4* 4.2*   HGB 7.3* 6.5* 8.1*   PLT 69* 53* 58*     BMP:    Recent Labs     03/11/23  0618 03/12/23  0548 03/13/23  0515    137 137   K 3.8 3.8 4.0    100 100   CO2 24 23 23   BUN 59* 60* 62*   CREATININE 6.0* 6.1* 6.1*   GLUCOSE 79 91 76     Hepatic:   Recent Labs     03/11/23  0618 03/12/23  0548 03/13/23  0515   AST 18 16 19   ALT 6 6 7  BILITOT 0.3 0.4 0.5   ALKPHOS 125* 107* 116*     Protein/ Albumin:    Lab Results   Component Value Date    LABALBU 3.1 (L) 03/13/2023      Imaging Results          Procedure Component Value Ref Range Date/Time     US GUIDED PARACENTESIS [[id number]] Collected: 03/10/23 1652     Order Status: Completed Updated: 03/10/23 1655     Narrative:       PROCEDURE:   PARACENTESIS WITHOUT IMAGE GUIDANCE     US ABDOMEN LIMITED     3/9/2023     HISTORY:   ORDERING SYSTEM PROVIDED HISTORY: ascites   TECHNOLOGIST PROVIDED HISTORY:   Reason for exam:->ascites     TECHNIQUE:   Informed consent was obtained after a detailed explanation of the procedure   including risks, benefits, and alternatives. Universal protocol was   followed. A limited ultrasound of the abdomen was performed. The right   abdomen was prepped and draped in sterile fashion and local anesthesia was   achieved with lidocaine. An 8 [place] needle sheath was advanced into ascites   and paracentesis was performed. The patient tolerated the procedure well. FINDINGS:   Limited ultrasound of the abdomen demonstrates ascites. A total of 7150 cc   was removed. Impression:       Successful paracentesis. US DUP ABD PEL RETRO SCROT LIMITED [[id number]] Collected: 03/10/23 1614     Order Status: Completed Updated: 03/10/23 1618     Narrative:       EXAMINATION:   DOPPLER EVALUATION OF THE PELVIS     3/10/2023 1:26 pm     COMPARISON:   None. HISTORY:   ORDERING SYSTEM PROVIDED HISTORY: Portal hepatic veins/cirrhosis   TECHNOLOGIST PROVIDED HISTORY:   Reason for exam:->Portal hepatic veins/cirrhosis   What reading provider will be dictating this exam?->CRC     FINDINGS:   Cirrhotic liver. Moderate perihepatic ascites. Borderline gallbladder wall   thickening up to 0.4 cm, which could relate to chronic liver disease. No   shadowing gallstone. Negative sonographic Munson's sign. No significant   bile duct dilation with the common duct is 0.7 cm. Pancreas not well   visualized on this study. Right kidney grossly unremarkable. Patent portal   vein, main hepatic artery, and hepatic veins. Impression:       Cirrhotic liver. Ascites. US LIVER [[id number]] Collected: 03/10/23 1606     Order Status: Completed Updated: 03/10/23 1617     Narrative:       EXAMINATION:   RIGHT UPPER QUADRANT ULTRASOUND     3/10/2023 1:25 pm     COMPARISON:   None. HISTORY:   ORDERING SYSTEM PROVIDED HISTORY: Cirrhosis/rule out mass   TECHNOLOGIST PROVIDED HISTORY:     Reason for exam:->Cirrhosis/rule out mass   What reading provider will be dictating this exam?->CRC     FINDINGS:   LIVER:  Cirrhotic liver. BILIARY SYSTEM:  Borderline gallbladder wall thickening 0.4 cm, which could   relate to chronic liver disease. No shadowing gallstone. Negative   sonographic Munson sign. Common bile duct is within normal limits measuring 0.7 cm. RIGHT KIDNEY: The right kidney is grossly unremarkable without evidence of   hydronephrosis. PANCREAS:  Pancreas not well visualized on this study. OTHER: Moderate perihepatic ascites. Portal vein patent. Hepatic artery   patent. Patent hepatic veins. Impression:       Cirrhotic liver. Ascites. IR US GUIDED PARACENTESIS [[id number]]      Order Status: Canceled      US RETROPERITONEAL COMPLETE [[id number]] Collected: 03/08/23 1506     Order Status: Completed Updated: 03/08/23 1510     Narrative:       EXAMINATION:   RETROPERITONEAL ULTRASOUND OF THE KIDNEYS AND URINARY BLADDER     3/8/2023     COMPARISON:   None     HISTORY:   ORDERING SYSTEM PROVIDED HISTORY: MARY   TECHNOLOGIST PROVIDED HISTORY:     Reason for exam:->MARY   What reading provider will be dictating this exam?->CRC     FINDINGS:     Kidneys: The right kidney measures 10.7 cm in length and the left kidney measures 11.7   cm  in length. Bilateral hyperechoic kidneys. No evidence of hydronephrosis or intrarenal   stones.   Multiple bilateral renal cysts with the largest measuring 2.7 cm   involving the mid zone of the left kidney. Bladder:     Wilkerson catheter in a decompressed urinary bladder. MISCELLANEOUS: Large volume ascites      Impression:       Multiple bilateral renal cysts with the largest measuring 2.7 cm. Large volume ascites. Bilateral hyperechoic kidneys. ECHO 3/09/2023    Normal left ventricular chamber size. Normal left ventricular systolic function, EF 43%. Mild left ventricular concentric hypertrophy noted. Stage II diastolic dysfunction. Left atrium is enlarged. Increased left atrial volume. Interatrial septum not well visualized but appears intact. Normal right ventricle size and function. There is mild mitral regurgitation. No mitral valve prolapse. Mild aortic stenosis - Mean gradient 18mmHg, peak 31mmHg, SELINA 1.7cm2 by   VTI, 1.6cm2 by Vmax, DLI 0.52. Mild aortic regurgitation, pressure 1/2 time 484ms. There is mild tricuspid regurgitation. Mild pulmonary hypertension, RVSP 45mmHg. Normal aortic root size. No evidence of pericardial effusion. Moderate to large pleural effusion. No intra cardiac mass or thrombus. No previous echo for comparison. Objective:     Vitals: /60   Pulse 91   Temp 98.3 °F (36.8 °C) (Oral)   Resp 18   Ht 5' 2\" (1.575 m)   Wt 184 lb (83.5 kg)   SpO2 95%   BMI 33.65 kg/m²   General appearance:  Awake alert and oriented not in acute distress. Patient's son was present in the room. Patient was not wearing oxygen. Lungs: Slightly diminished air movement both lung bases  Heart: No S3, No rub  Abdomen: Distended (ascites) lax and soft. No tenderness. L. Extremities: +1 bilateral equal edema    Assessment & Plan:     Stage V chronic kidney disease not on kidney replacement therapy yet.   The patient has an immature left upper arm fistula scheduled for surgical revision on April 6 I did explain to her again today that at any point if she develops uremic symptoms we will insert a right IJ tunneled dialysis catheter to initiate hemodialysis. I also explained to her that the optimal scenario would be to wait until her arm access is ready to be used and try to avoid a tunneled dialysis catheter placement. She expressed understanding. Her son also expressed understanding. Hypervolemia: In the context of advanced chronic kidney disease stage V and liver cirrhosis on imaging. She is status post 7 L paracentesis fluid removal.  She is on Bumex drip with acceptable urine output. Switch Bumex to 2 mg IV every 8 hours. Use IV albumin as needed. Hypocalcemia: Mild. Supplemented. Patient has secondary hyperparathyroidism of kidney disease and she has severe hypovitaminosis D on supplementation    Hyperphosphatemia: Mild. Reflecting advanced stage V CKD. Phosphorus is 5.5 or above I will add phosphate binder. Anemia: Severe: Appreciate hematology service input. Likely of advanced stage V CKD. Transfuse as needed    Thrombocytopenia: In the context of liver disease. Patient is followed by hematology service    Elevated TSH: Followed by primary service      This note was created using voice recognition software.     Electronically signed by Brianna Simons MD on 3/13/2023 at 3:22 PM

## 2023-03-13 NOTE — PROGRESS NOTES
OCCUPATIONAL THERAPY BEDSIDE TREATMENT NOTE   [facility]  Nydia Khalil. [place]     Date:3/13/2023  Patient Name: Ana Metzger  MRN: [id number]  : 1950  Room: [address] [place] [address]     Evaluating OT: Skylarbernabe Mohan OTR/L; [id number]      Referring Provider and Specific Provider Orders/Date:      23   OT eval and treat  Start:  23,   End:  23 111,   ONE TIME,   Standing Count:  1 Occurrences,   R         Amparo Marin Bisel, DO       Placement Recommendation: Subacute         Diagnosis:   No diagnosis found.        Surgery: anticipate paracentesis d/t abdominal ascites       Pertinent Medical History:       Past Medical History        Past Medical History:   Diagnosis Date    Anemia       iron deficiency    Hypertension      Kidney failure              Past Surgical History         Past Surgical History:   Procedure Laterality Date    CT BIOPSY RENAL   11/10/2022     CT BIOPSY RENAL 11/10/2022 Juan Lee MD [facility]    DIALYSIS FISTULA CREATION Left 2/10/2023     CREATION ARTERIOVENOUS FISTULA LEFT ARM performed by Casie Mckinney MD at [facility] OR          Precautions:  Fall Risk, multiple picked area on the skin with scabbing, chronic kidney disease  Comment: had fallen at home this past Tuesday 3/7/23       Assessment of current deficits:     [x] Functional mobility            [x]ADLs           [x] Strength                   []Cognition    [x] Functional transfers          [x] IADLs          [] Safety Awareness   [x]Endurance    [] Fine Coordination              [x] Balance      [] Vision/perception    []Sensation      []Gross Motor Coordination  [] ROM           [] Delirium                   [] Motor Control      OT PLAN OF CARE   OT POC based on physician orders, patient diagnosis and results of clinical assessment     Frequency/Duration 1-3 days/wk for 2 weeks PRN      Specific OT Treatment Interventions to include:   * Instruction/training on adapted ADL techniques and AE recommendations to increase functional independence within precautions       * Training on energy conservation strategies, correct breathing pattern and techniques to improve independence/tolerance for self-care routine  * Functional transfer/mobility training/DME recommendations for increased independence, safety, and fall prevention  * Patient/Family education to increase follow through with safety techniques and functional independence  * Recommendation of environmental modifications for increased safety with functional transfers/mobility and ADLs  * Therapeutic exercise to improve motor endurance, ROM, and functional strength for ADLs/functional transfers  * Therapeutic activities to facilitate/challenge dynamic balance, stand tolerance for increased safety and independence with ADLs  * Positioning to improve skin integrity, interaction with environment and functional independence     Recommended Adaptive Equipment: TBD at rehab       Home Living: with son; single family home, 1 story, 1+3 steps to enter with B rails, laundry in the basement, walk in shower. Equipment owned: quad cane, built in shower chairs      Prior Level of Function: Independent with ADLs, son assists with cooking, laundry and medicine management. Needs assistance with IADLs; ambulated with no device, fell this past Tuesday and the son states she hasent being able to walk since. Pt states both of her \"knees are bad. \"      Driving: not since October 2022  Occupation: no working      Pain Level: no pain; Nursing notified.        Cognition: A&O: 3/4; Follows 2 step directions              Memory: good               Sequencing: good               Problem solving: fair               Judgement/safety: fair      [facility]   How much help is needed for putting on and taking off regular lower body clothing?: A Lot  How much help is needed for bathing (which includes washing, rinsing, drying)?: A Lot  How much help is needed for toileting (which includes using toilet, bedpan, or urinal)?: Total  How much help is needed for putting on and taking off regular upper body clothing?: A Lot  How much help is needed for taking care of personal grooming?: A Little  How much help for eating meals?: A Little  AM-[facility] Inpatient Daily Activity Raw Score: 13  AM-PAC Inpatient ADL T-Scale Score : 32.03  ADL Inpatient CMS 0-100% Score: 63.03  ADL Inpatient CMS G-Code Modifier : CL    Functional Assessment:     Initial Eval Status  Date: 3/9/23 Treatment Status  Date: 3/13/23 STGs = LTGs  Time frame: 10-14 days   Feeding Supervision with set up   N/T   Independent    Grooming Moderate Assist to massage shampoo cap into scalp and towel dry. Moderate Assist to brush hair. Supervision with set up provided for oral care: brush teeth and rinse while seated in bedside chair. N/T  Independent    UB Dressing Moderate Assist to doff and don hospital gown  N/T  Independent    LB Dressing Dependent  Maximal Assist to thread LEs through briefs and don over hips upon standing at wheeled walker. Dependent to don socks at bed level. Supervision    Bathing Maximal Assist to sponge bathe while rolling in bed, power applied. N/T   Minimal Assist    Toileting Dependent for hygiene    Dependent for rear hygiene while standing supported at walker. Patient with loose BM. Nursing aware. Pt has valles catheter. Supervision    Bed Mobility  Supine to sit: Minimal Assist   Moderate assist to scoot out  Sit to supine: N/T as pt was up in chair  Supine to sit: Minimal Assist   Moderate assist to scoot out  Sit to supine: N/T as pt was up in chair     Supine to sit: Independent   Sit to supine: Independent    Functional Transfers Minimal Assist from EOB to wheeled walker. Transfer training with verbal cues for hand placement throughout session to improve safety.   Moderate Assist initially progressing to Minimal Assist from EOB to wheeled walker x 3 reps and sit <> stand transfer at chair. Verbal cues for hand placement and technique to improve safety. Independent    Functional Mobility Minimal Assist with EOB to bedside chair to improve balance, verbal cues for walker sequence and safety. Minimal Assist with wheeled walker to improve balance within room to simulate household distances. Verbal cues for walker sequence and safety. Modified [place]    Balance Sitting:     Static: good     Dynamic: fair   Standing: fair  with wheeled walker Sitting:     Static: good     Dynamic: fair   Standing: fair with wheeled walker        Activity Tolerance fair  Fair / fair plus   good    Visual/  Perceptual Glasses: yes              Comments: RN cleared patient for OT. Upon arrival to the room the patient was supine, son at bedside. At end of the session, the patient was in chair. Call light and phone within reach. Pt required verbal cues and instruction as noted above for safe facilitation and completion of tasks. Therapist provided skilled monitoring of the patient's response during treatment session. Prior to and at the end of session, environmental modifications/line management completed for patients safety and efficiency of treatment session. Overall, the patient demonstrates decreased independence with ADLs and functional transfers and mobility. Pt limited by generalized weakness, activity tolerance, and balance. Pt would benefit from continued skilled OT to increase independence with BADL's and IADL's. Treatment: OT treatment provided this date includes:  Instructions/training on safety, sequencing, and adapted techniques for completion of ADLs. Facilitated bed mobility with cues for proper body mechanics and sequencing to prepare for ADL completion.   Instruction/training on safe functional mobility/transfer techniques including hand and feet placement   Instruction/training on energy conservation/work simplification for completion of ADLs  Facilitated proper positioning/alignment to improve interaction with environment, breathing, overall functioning    Pt has made fair plus progress towards set goals. Continue with current plan of care       Treatment time includes thorough review of current medical information, gathering information on past medical history/social history and prior level of function, completion of standardized testing/informal observation of tasks, assessment of data, and development of POC/Goals.     Time In:  3:30 PM   Time Out: 3:55 PM                  Min Units   OT Eval Low 83565     OT Eval Medium 51498     OT Eval High 15547     OT Re-Eval 72798          ADL/Self Care 06304 15 1   Therapeutic Activities 84108 10 1   Therapeutic Ex 53527     Orthotic Management 16520     Neuro Re-Ed 28515     Non-Billable Time     TOTAL TIMED TREATMENT 25 2     Virgen Hubbard OTR/L #[id number]

## 2023-03-13 NOTE — CARE COORDINATION
Ss note: 3/13/2023 11:04 AM Need Rapid covid prior to discharge, pt is on room air. Follow up visit to room, son present with whom pt resides. Plan remains [address] for skilled rehab at discharge liaison CHHAYA MCCORMICK [facility] relays accepted and is following. NO PRECERT. Pt has fistula, being followed by Nephrology, no outpt HD needed yet. Will need Hens and signed ELDA for SNF. SW will follow.  NAVJOT Hubbard

## 2023-03-13 NOTE — PROGRESS NOTES
Progress Note    Subjective:  Patient feeling a little better  Ate fairly well today  Denies abnormal bleeding, nose bleeds  Received PRBC today  Stool + blood    Objective:    BP (!) 119/56   Pulse 87   Temp 98.3 °F (36.8 °C) (Oral)   Resp 18   Ht 5' 2\" (1.575 m)   Wt 184 lb (83.5 kg)   SpO2 98%   BMI 33.65 kg/m²     General: Pleasant woman awake alert no apparent distress  HEENT: Anicteric sclera, oropharynx is clear, no mucositis or thrush  Heart:  RRR, murmur appreciated, no gallops, or rubs. Lungs:  CTA bilaterally, no wheeze, rales or rhonchi  Abd: bowel sounds present, nontender, slightly distended. Extrem: Warm. Pulses intact, generalized edema LE  Skin: scattered lesion with hemorrhagic crust on legs-excoriations patient picking    CBC:   Lab Results   Component Value Date/Time    WBC 4.2 03/13/2023 05:15 AM    RBC 2.73 03/13/2023 05:15 AM    HGB 8.1 03/13/2023 05:15 AM    HCT 24.9 03/13/2023 05:15 AM    MCV 91.2 03/13/2023 05:15 AM    MCH 29.7 03/13/2023 05:15 AM    MCHC 32.5 03/13/2023 05:15 AM    RDW 14.4 03/13/2023 05:15 AM    PLT 58 03/13/2023 05:15 AM    MPV 12.7 03/13/2023 05:15 AM     CMP:    Lab Results   Component Value Date/Time     03/13/2023 05:15 AM    K 4.0 03/13/2023 05:15 AM    K 3.9 02/10/2023 07:40 AM     03/13/2023 05:15 AM    CO2 23 03/13/2023 05:15 AM    BUN 62 03/13/2023 05:15 AM    CREATININE 6.1 03/13/2023 05:15 AM    LABGLOM 7 03/13/2023 05:15 AM    GLUCOSE 76 03/13/2023 05:15 AM    PROT 5.7 03/13/2023 05:15 AM    LABALBU 3.1 03/13/2023 05:15 AM    CALCIUM 7.8 03/13/2023 05:15 AM    BILITOT 0.5 03/13/2023 05:15 AM    ALKPHOS 116 03/13/2023 05:15 AM    AST 19 03/13/2023 05:15 AM    ALT 7 03/13/2023 05:15 AM            US DUP ABD PEL RETRO SCROT LIMITED   Final Result   Cirrhotic liver. Ascites. US LIVER   Final Result   Cirrhotic liver. Ascites. US GUIDED PARACENTESIS   Final Result   Successful paracentesis.          US RETROPERITONEAL COMPLETE   Final Result   Multiple bilateral renal cysts with the largest measuring 2.7 cm. Large volume ascites. Bilateral hyperechoic kidneys.          US ABDOMEN LIMITED    (Results Pending)   IR INTERVENTIONAL RADIOLOGY PROCEDURE REQUEST    (Results Pending)         Current Facility-Administered Medications:     pantoprazole (PROTONIX) injection 40 mg, 40 mg, IntraVENous, BID, QUINTEN Posada CNP    [START ON 3/14/2023] albumin human 25 % IV solution 50 g, 50 g, IntraVENous, Once PRN, Malika Ortega APRN - CNP    bumetanide (BUMEX) injection 2 mg, 2 mg, IntraVENous, Q8H, Andreas Diane MD, 2 mg at 03/13/23 1657    calcium gluconate 1,000 mg in sodium chloride 50 mL, 1,000 mg, IntraVENous, Once, Donnita Closs, MD, Last Rate: 50 mL/hr at 03/13/23 1735, 1,000 mg at 03/13/23 1735    0.9 % sodium chloride infusion, , IntraVENous, PRN, Rj Wayne DO    polyethylene glycol (GLYCOLAX) packet 17 g, 17 g, Oral, Daily, Gabby Avalos MD, 17 g at 03/13/23 0510    cholestyramine (QUESTRAN) packet 4 g, 1 packet, Oral, BID, Rj Wayne DO, 4 g at 68/86/73 9088    folic acid (FOLVITE) tablet 1 mg, 1 mg, Oral, Daily, QUINTEN Bear - CNP, 1 mg at 03/13/23 0853    colchicine (COLCRYS) tablet 0.6 mg, 0.6 mg, Oral, Every Other Day, Constanza Wayne DO, 0.6 mg at 03/13/23 9860    levothyroxine (SYNTHROID) tablet 100 mcg, 100 mcg, Oral, Daily, Ángelinio Sindy Bisberonica DO, 100 mcg at 03/13/23 2638    ferrous sulfate (IRON 325) tablet 325 mg, 325 mg, Oral, Once per day on Mon Wed Fri, Rj Wayne DO, 325 mg at 03/13/23 0853    amLODIPine (NORVASC) tablet 20 mg, 20 mg, Oral, Daily, Rj Wayne DO, 20 mg at 03/13/23 0853    FLUoxetine (PROZAC) capsule 20 mg, 20 mg, Oral, Daily, Rj Wayne DO, 20 mg at 03/13/23 0853    hydrALAZINE (APRESOLINE) tablet 20 mg, 20 mg, Oral, BID, Rj Wayne DO, 20 mg at 03/13/23 0852    OLANZapine zydis (ZYPREXA) disintegrating tablet 2.5 mg, 2.5 mg, Oral, Nightly, Carmen Haggis Bisel, DO, 2.5 mg at 03/12/23 2107    sodium bicarbonate tablet 650 mg, 650 mg, Oral, BID, Carmen Haggis Bisel, DO, 650 mg at 03/13/23 6041    vitamin B-12 (CYANOCOBALAMIN) tablet 1,000 mcg, 1,000 mcg, Oral, Daily, Carmen Haggis Bisel, DO, 1,000 mcg at 03/13/23 7959    zinc gluconate tablet 50 mg, 50 mg, Oral, Daily, Rj CAYLA Bisel, DO, 50 mg at 03/13/23 0796    [Held by provider] pantoprazole (PROTONIX) tablet 40 mg, 40 mg, Oral, QAM AC, Rj E Bisel, DO, 40 mg at 03/13/23 3360    perflutren lipid microspheres (DEFINITY) injection 1.5 mL, 1.5 mL, IntraVENous, ONCE PRN, Carmen Haggis Bisel, DO    vitamin D (ERGOCALCIFEROL) capsule 50,000 Units, 50,000 Units, Oral, Weekly, Carmen Haggis Bisel, DO, 50,000 Units at 03/09/23 3803    Assessment:    Principal Problem:    Acute renal failure (ARF) ([facility] [address]  Resolved Problems:    * No resolved hospital problems. *      66 yo female with multifactorial anemia in setting of CKD stage IV (IgA nephropathy), iron deficiency, B12 deficiency, +Intrinsic factor Ab, pernicious anemia. Also hx of liver cirrhosis. She was treated with IV iron and recently started on VIKKI on 1/16/23 and given 1 dose, missed 2nd dose on 2/23/23. Admitted with volume overload and acute on CKD. S/p paracentesis of 7150 cc fluid. Plan:  completed 3 doses of IV Ferrlecit as ordered  - VIKKI outpatient after replacement of iron stores confirmed  - Transfuse PRBCs today. Maintain hemoglobin above 7.  - GI also following along. If she continues to have decreasing hemoglobin, she may require EGD while in the hospital.  - Continue diuresis as per nephrology and primary team.  - Continue folic acid 1 mg p.o. daily. - Continue vitamin B12 p.o. daily.         Electronically signed by ROBERT Arellano on 3/13/2023 at 6:17 PM

## 2023-03-13 NOTE — PROGRESS NOTES
Physical Therapy Treatment Note/Plan of Care    Room #:  9239/2456-77  Patient Name: Corky Garibay  YOB: 1950  MRN: [id number]    Date of Service: 3/13/2023     Tentative placement recommendation: Subacute Rehab  Equipment recommendation: Lamona Chain      Evaluating Physical Therapist: Anaya Zacarias PT #[id number]      Specific Provider Orders/Date/Referring Provider : 03/08/23 1115    PT eval and treat  Start:  03/08/23 1115,   End:  03/08/23 1115,   ONE TIME,   Standing Count:  1 Occurrences,   R         Lexie Lopez,       Admitting Diagnosis:   Acute renal failure (ARF) ([facility] [address] [N17.9]     Heavenly Ryan yesterday-lost her balance- legs are too weak to support her    Went to [facility]- also has \"fluid in her belly and fluid in her legs\"  Surgery: none      Patient Active Problem List   Diagnosis    End stage renal disease ([facility] [address]    Encounter regarding vascular access for dialysis for end-stage renal disease ([facility] [address]    Acute renal failure (ARF) ([facility] [address]        ASSESSMENT of Current Deficits Patient exhibits decreased strength, balance, and endurance impairing functional mobility, transfers, gait , gait distance, and tolerance to activity slow danae, slight shortness of breath maintains O2 saturation. Cues needed for obstacle negotiation. Patient was motivated to work with therapy this date. Patient requires continued skilled physical therapy to address concerns listed above for increased safety and function at discharge. Pt mildly unsteady during ambulation with 1 LOB requiring ModA for correction during session. Pt agreeable to seated exercises following function during session.  Pt to trial stairs on next session with PT.       PHYSICAL THERAPY  PLAN OF CARE       Physical therapy plan of care is established based on physician order,  patient diagnosis and clinical assessment    Current Treatment Recommendations:    -Bed Mobility: Lower extremity exercises   -Sitting Balance: Incorporate reaching activities to activate trunk muscles   -Standing Balance: Perform strengthening exercises in standing to promote motor control with or without upper extremity support   -Transfers: Provide instruction on proper hand and foot position for adequate transfer of weight onto lower extremities and use of gait device if needed and Cues for hand placement, technique and safety. Provide stabilization to prevent fall   -Gait: Gait training, Standing activities to improve: base of support, weight shift, weight bearing , and Pregait training to emphasize: Sequencing , Device control, Safety, and pacing   -Endurance: Utilize Supervised activities to increase level of endurance to allow for safe functional mobility including transfers and gait  and Use graduated activities to promote good breathing techniques and provide support and education to maximize respiratory function    PT long term treatment goals are located in below grid    Patient and or family understand(s) diagnosis, prognosis, and plan of care. Frequency of treatments: Patient will be seen  daily. Prior Level of Function: Patient ambulated independently    Rehab Potential: good - for baseline    Past medical history:   Past Medical History:   Diagnosis Date    Anemia     iron deficiency    Hypertension     Kidney failure      Past Surgical History:   Procedure Laterality Date    CT BIOPSY RENAL  11/10/2022    CT BIOPSY RENAL 11/10/2022 Juan Randhawa MD [facility]    DIALYSIS FISTULA CREATION Left 2/10/2023    CREATION ARTERIOVENOUS FISTULA LEFT ARM performed by Alena Moreland MD at [address]:    Precautions:    , falls ,      Imaging results: No results found. Social history: Patient lives with son in a ranch home  with 3 steps, bilateral rails  to enter home.    Walk in shower  , built in shower chair     Equipment owned: Cane and Quad cane,       AM-PAC Basic Mobility        AM-PAC Basic Mobility - Inpatient   How much help is needed turning from your back to your side while in a flat bed without using bedrails?: A Little  How much help is needed moving from lying on your back to sitting on the side of a flat bed without using bedrails?: A Little  How much help is needed moving to and from a bed to a chair?: A Little  How much help is needed standing up from a chair using your arms?: A Little  How much help is needed walking in hospital room?: A Little  How much help is needed climbing 3-5 steps with a railing?: A Lot  AM-PAC Inpatient Mobility Raw Score : 17  AM-PAC Inpatient T-Scale Score : 42.13  Mobility Inpatient CMS 0-100% Score: 50.57  Mobility Inpatient CMS G-Code Modifier : CK    Nursing cleared patient for PT treatment. OBJECTIVE;   Initial Evaluation  Date: 3/9/2023 Treatment Date:    3/13/2023   Short Term/ Long Term   Goals   Was pt agreeable to Eval/treatment? Yes Yes  To be met in 3 days   Pain level   0/10    No pain     Bed Mobility    Rolling: Not assessed patient in chair     Rolling: Not assessed patient in chair   Supine to sit: Not assessed patient in chair   Sit to supine: Not assessed patient in chair   Scooting: Supervision     Rolling: Independent    Supine to sit: Independent    Sit to supine: Independent    Scooting: Independent     Transfers Sit to stand: Minimal assist of 1 Cues for hand placement and safety  Sit to stand: Minimal assist of 1 cues for hand placement   Sit to stand: Supervision      Ambulation     2 x 20 feet using  wheeled walker with Minimal assist of 1   for walker control and cues for walker approximation, safety, and pacing 30' and 40  feet using  wheeled walker with Minimal assist of 1   for walker approximation, balance, and safety obstacle negotiation.       2 x 50 feet using  wheeled walker with Supervision     Stair negotiation: ascended and descended   Not assessed  Not assessed  4 stairs with 1 HR with Supervision   ROM Within functional limits    Increase range of motion 10% of affected joints    Strength BUE:  refer to OT eval  RLE:  3+/5  LLE:  3+/5  Increase strength in affected mm groups by 1/3 grade   Balance Sitting EOB:  not assessed    Dynamic Standing:  fair wheeled walker  Sitting EOB: fair   Dynamic Standing: fair    Sitting EOB:  good    Dynamic Standing: good -wheeled walker      Patient is Alert & Oriented x person, place, time, and situation and follows directions    Sensation:  Patient  denies numbness/tingling   Edema:  yes bilateral lower extremities   Endurance: fair       Vitals: room air   Blood Pressure at rest  Blood Pressure during session    Heart Rate at rest  Heart Rate during session    SPO2 at rest %  SPO2 during session 93-97%     Patient education  Patient educated on role of Physical Therapy, risks of immobility, safety and plan of care,  importance of mobility while in hospital , ankle pumps, quad set and glut set for edema control, blood clot prevention, and positioning for skin integrity and comfort     Patient response to education:   Pt verbalized understanding Pt demonstrated skill Pt requires further education in this area   Yes Partial Yes      Treatment:  Patient practiced and was instructed/facilitated in the following treatment:  Sat edge of bed 15 minutes with Supervision  to increase dynamic sitting balance and activity tolerance. Pt performed bed mobility, transfers, ambulation, seated exercises. Therapeutic Exercises:  ankle pumps, heel raises, hip abduction/adduction, long arc quad, and seated marching  x 15-20 reps. At end of session, patient in chair with  son present  call light and phone within reach,  all lines and tubes intact, nursing notified. Patient would benefit from continued skilled Physical Therapy to improve functional independence and quality of life.          Patient's/ family goals   get stronger    Time in  1609  Time out  1640    Total Treatment Time  31 minutes    CPT codes:    Therapeutic activities (77552)   18 minutes  1 unit(s)  Therapeutic exercises (44852)   13 minutes  1 unit(s)    Genoveva Miller PT

## 2023-03-13 NOTE — CONSULTS
Department of Podiatry   Consult Note        Reason for Consult:  Onychomycosis / Podiatric Foot Examiantion  Requesting Physician:       CHIEF COMPLAINT:  Onychomycosis / Podiatric Foot Exam     HISTORY OF PRESENT ILLNESS:  Patient is a 74year old female who presents to podiatry service with elongated, incurvated, painful toe nails that she would like debrided today. Denies nausea, vomiting, fevers, chills, shortness of breath, chest pain, or other pedal complaints at this time. Medical History     Past Medical History:   Diagnosis Date    Anemia     iron deficiency    Hypertension     Kidney failure        Surgical History:  Past Surgical History:   Procedure Laterality Date    CT BIOPSY RENAL  11/10/2022    CT BIOPSY RENAL 11/10/2022 Juan Kunz MD [facility]    DIALYSIS FISTULA CREATION Left 2/10/2023    CREATION ARTERIOVENOUS FISTULA LEFT ARM performed by Leanord Sacks, MD at [facility] OR       Family History:  No family history on file. Allergies:  No Known Allergies    I reviewed the patient's past medical and surgical history, Medications and Allergies. REVIEW OF SYSTEMS:    10 point review of systems is negative unless otherwise noted below:        PHYSICAL EXAM:       Vitals:    03/12/23 1915 03/12/23 2106 03/13/23 0643 03/13/23 0852   BP: 133/63 133/63 128/60 128/60   Pulse: 77  91    Resp: 18  18    Temp: 98.2 °F (36.8 °C)  98.3 °F (36.8 °C)    TempSrc: Oral  Oral    SpO2: 96%  95%    Weight:       Height:              SKIN:  Skin is intact and well hydrated to the bilateral lower extremities. No rashes lesions or ulcers. NAILS:  Nails 1-5 on the right and left are thickened, elongated and discolored dystrophic incurvated and painful with palpation  NEUROLOGIC:  Protective sensation is diminished by grossly intact  VASCULAR:  DP and PT pulses are palpable. CFT less than 3 seconds. Warm to warm from the tibial tuberosity to the distal aspect of the digits dorsally.  Hair growth noted to the distal aspects dorsally. MUSCULOSKELETAL: Rectus foot type. Muscle strength graded 5/5 for all muscles crossing the ankle joint bilaterally. ASSESSMENT:  Tinea unguium  Pain in right toes and left toes  End stage renal disease  Hypetension  Cirrhosis        PLAN:  Initial examination and evaluation  Reviewed ancillary service notes and pertinent diagnostics  Nails 1-5 on the right and left debrided to wound bed without incident and to the patients satisfaction. Nails debrided in both length and thickness to relieve pain and decrease risk of infection and or ulceration  Podiatric foot exam.   OK to d/c from Podiatry service.  Reconsult if any other podiatric issues    Angy Mchugh DPM PGY-1  03/13/23

## 2023-03-13 NOTE — PROGRESS NOTES
Internal Medicine Progress Note    [facility]    [address] Floor. Wilner Mo., CHRIS.IVAN.JAZMYNONydiaI. Demi Purcell D.O., CLIVE Vasquez D.O. GALINDO Uribe, MSN, APRN, NP-C  Kalyan Arora. Margarita Beal, MSN, APRN-CNP     Primary Care Physician: Sita Islas MD   Admitting Physician:  Shaina Cardoza DO  Admission date and time: 3/8/2023  9:59 AM    Room:  [address] [place] [address]  Admitting diagnosis: Acute renal failure (ARF) [facility]) [N17.9]    Patient Name: Colton Tyler  MRN: [id number]    Date of Service: 3/13/2023     Subjective:  Moisés Patel is a 74 y.o. female who was seen and examined today,3/13/2023, at the bedside. She is feeling somewhat better overall. She continues to improve on a daily basis. Her primary complaint is that of ongoing leg swelling with difficulty ambulating secondary to this. We discussed the need for increased activity and water therapy. Richard Walker, son, present during my examination. Review of System:   Constitutional:   Denies fever or chills, weight loss or gain, positive for fatigue/malaise-improving  HEENT:   Denies ear pain, sore throat, sinus or eye problems. Cardiovascular:   Denies any chest pain, irregular heartbeats, or palpitations. Respiratory:   Denies shortness of breath, coughing, sputum production, hemoptysis, or wheezing. Gastrointestinal:   Denies nausea, vomiting, diarrhea, or constipation. Reports improvement in abdominal discomfort following paracentesis. .    Genitourinary:    Denies any urgency, frequency, hematuria. Voiding  without difficulty with Wilkerson catheter. Extremities:   Positive for lower extremity swelling//edema which is improved from initial presentation but remains moderate  Neurology:    Denies any headache or focal neurological deficits, generalized weakness and deconditioning without focal complaint  Psch:   Denies being anxious or depressed.   Musculoskeletal: Denies  myalgias, joint complaints or back pain. Integumentary:   Denies any rashes, ulcers, or excoriations. Denies bruising. Hematologic/Lymphatic:  Denies bruising or bleeding. Physical Exam:  No intake/output data recorded. Intake/Output Summary (Last 24 hours) at 3/13/2023 0907  Last data filed at 3/13/2023 0520  Gross per 24 hour   Intake 570 ml   Output 950 ml   Net -380 ml     I/O last 3 completed shifts: In: 890.7 [P.O.:265; Blood:350; IV Piggyback:275.7]  Out: 2350 [Urine:2350]  No data found. Vital Signs:   Blood pressure 128/60, pulse 91, temperature 98.3 °F (36.8 °C), temperature source Oral, resp. rate 18, height 5' 2\" (1.575 m), weight 184 lb (83.5 kg), SpO2 95 %. General appearance:  Alert, responsive, oriented to person, place, and time. Acute on chronic ill appearance, no distress. Head:  Normocephalic. No masses, lesions or tenderness. Eyes:  PERRLA. EOMI. Sclera clear. Buccal mucosa moist.  Impaired vision. ENT:  Ears normal. Mucosa normal.  Neck:    Supple. Trachea midline. No thyromegaly. No JVD. No bruits. Heart:    Rhythm regular. Rate controlled. S1 and S2. Systolic murmur. Lungs:    Symmetrical.  Diminished bibasilar air exchange. Otherwise lungs are clear to auscultation bilaterally. No wheezes. No rhonchi. No rales. Abdomen:   Softly distended. Bowel sounds positive. No organomegaly or masses. Interval improvement in degree of ascites. .  Extremities:    Peripheral pulses present. 1-2+ pitting edema the bilateral lower extremities. Edema/swelling of the upper extremities. AV fistula left upper extremity. Neurologic:    Alert x 3. Generally weak without focal deficit. Cranial nerves grossly intact. No focal weakness. Psych:   Behavior is normal. Mood appears normal. Speech is not rapid and/or pressured. Musculoskeletal:   No unilateral joint edema, erythema or warmth. .  Gait not assessed.   Integumentary:  Patient has new open areas as well as old healing scratches that are crusted over. Patient admits to digging at her skin secondary to itching. See EMR for full details under skin  Genitalia/Breast:  Wilkerson catheter. Medication:  Scheduled Meds:   polyethylene glycol  17 g Oral Daily    cholestyramine  1 packet Oral BID    folic acid  1 mg Oral Daily    colchicine  0.6 mg Oral Every Other Day    levothyroxine  100 mcg Oral Daily    ferrous sulfate  325 mg Oral Once per day on Mon Wed Fri    amLODIPine  20 mg Oral Daily    FLUoxetine  20 mg Oral Daily    hydrALAZINE  20 mg Oral BID    OLANZapine zydis  2.5 mg Oral Nightly    sodium bicarbonate  650 mg Oral BID    vitamin B-12  1,000 mcg Oral Daily    zinc gluconate  50 mg Oral Daily    pantoprazole  40 mg Oral QAM AC    vitamin D  50,000 Units Oral Weekly     Continuous Infusions:   sodium chloride      bumetanide 0.1 mg/mL infusion 1 mg/hr (03/13/23 0022)       Objective Data:  Recent Labs     03/11/23  0618 03/12/23  0548 03/13/23  0515   WBC 3.1* 2.4* 4.2*   RBC 2.52* 2.21* 2.73*   HGB 7.3* 6.5* 8.1*   HCT 23.6* 20.6* 24.9*   MCV 93.7 93.2 91.2   MCH 29.0 29.4 29.7   MCHC 30.9* 31.6* 32.5   RDW 14.6 14.5 14.4   PLT 69* 53* 58*   MPV 11.6 11.9 12.7*       Recent Labs     03/11/23  0618 03/12/23  0548 03/13/23  0515    137 137   K 3.8 3.8 4.0    100 100   CO2 24 23 23   BUN 59* 60* 62*   CREATININE 6.0* 6.1* 6.1*   GLUCOSE 79 91 76   CALCIUM 7.3* 7.5* 7.8*   PROT 5.6* 5.8* 5.7*   LABALBU 2.5* 3.3* 3.1*   BILITOT 0.3 0.4 0.5   ALKPHOS 125* 107* 116*   AST 18 16 19   ALT 6 6 7         Wound Documentation:   See EMR-patient does have multiple scratches on her body from scratching. Assessment:  Multifactorial anasarca with progressive, end-stage renal failure and cirrhosis with decompensation  Acutely decompensated cirrhosis with ascites status post paracentesis performed 3/9/23 with removal of 7150 mL of ascitic fluid.   Acute on End-stage renal disease with possible hepatorenal syndrome with consideration for initiation of RRT.  Acute on chronic normochromic normocytic anemia with iron deficiency status post blood transfusion and iron  Essential hypertension.  Newly identified hypothyroidism with TSH of 11.870  Hyperuricemia  Depression.  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      Plan:   Laurence he is improving overall.  She has diuresed nearly 4 L and remains with around 1 L or greater of urinary output per day on Bumex infusion.  Multiple subspecialists are following in consultation for assistance in management including nephrology.  Diuretics are being implemented per the nephrology team and there is consideration for renal replacement therapy if edema becomes refractory or if uremia develops due to the patient's profound renal dysfunction.  GI is following as well in the setting of decompensated cirrhosis as well as acute on chronic anemia with guaiac positive stools.  PPI therapy is being implemented and there is consideration for potential endoscopy during this hospitalization.  Hematology was consulted as well due to the multifactorial anemia and the patient has received blood and iron infusions.  Continue with fluid and sodium restricted diet.  Continue with strict intake and output and daily weights.  Close monitoring of volume status as well as the potential need for repeat paracentesis.  Ultrasound will be repeated tomorrow to assess for need with repeat procedure if indicated.  PT, OT and social work will follow for discharge planning purposes as the patient is deviated from her baseline functional status.  Chronic comorbidities, labs and vital signs are being monitored and addressed accordingly.    Laurence requires this high level of physician care and nursing on the IMC/Telemetry unit due the complexity of decision management and chance of rapid decline or death.  Continued cardiac monitoring and higher level of nursing are required. I am readily available for any further decision-making  and intervention. More than 50% of my  time was spent at the bedside counseling/coordinating care with the patient and/or family with face to face contact. This time was spent reviewing notes and laboratory data as well as instructing and counseling the patient. Time I spent with the family or surrogate(s) is included only if the patient was incapable of providing the necessary information or participating in medical decisions. I also discussed the differential diagnosis and all of the proposed management plans with the patient and individuals accompanying the patient.     QUINTEN Weir - CARLITO,   3/13/2023  9:07 AM

## 2023-03-13 NOTE — PROGRESS NOTES
PROGRESS NOTE  By Jana Duron M.D. [facility]  Dr. Mark Edwards M.D.,  Dr. Odilia Casarez M.D.,   Dr. Tyra Fallon D.KAYCEE.,  Dr. Johana Doran M.D.,  Dr. Yunier Christiansen D.O.,          Ana Metzger  74 y.o.  female    SUBJECTIVE:  Denies abdominal pain. Denies nausea vomiting. Son at bedside    OBJECTIVE:    /60   Pulse 91   Temp 98.3 °F (36.8 °C) (Oral)   Resp 18   Ht 5' 2\" (1.575 m)   Wt 184 lb (83.5 kg)   SpO2 95%   BMI 33.65 kg/m²     General: NAD/ female. AAOx3  HEENT: Anicteric sclera/moist oral mucosa  Neck: Supple with trachea midline  Chest: Symmetric excursion/nonlabored respirations  Cor: Regular/S1-S2  Abd.: Soft and obese. Appears moderately distended  Extr.:  BLE 3+ edema  Skin: Warm and dry/anicteric      DATA:    Monitor data reviewed -sinus rhythm noted.        Lab Results   Component Value Date/Time    WBC 4.2 03/13/2023 05:15 AM    RBC 2.73 03/13/2023 05:15 AM    HGB 8.1 03/13/2023 05:15 AM    HCT 24.9 03/13/2023 05:15 AM    MCV 91.2 03/13/2023 05:15 AM    MCH 29.7 03/13/2023 05:15 AM    MCHC 32.5 03/13/2023 05:15 AM    RDW 14.4 03/13/2023 05:15 AM    PLT 58 03/13/2023 05:15 AM    MPV 12.7 03/13/2023 05:15 AM     Lab Results   Component Value Date/Time     03/13/2023 05:15 AM    K 4.0 03/13/2023 05:15 AM    K 3.9 02/10/2023 07:40 AM     03/13/2023 05:15 AM    CO2 23 03/13/2023 05:15 AM    BUN 62 03/13/2023 05:15 AM    CREATININE 6.1 03/13/2023 05:15 AM    CALCIUM 7.8 03/13/2023 05:15 AM    PROT 5.7 03/13/2023 05:15 AM    LABALBU 3.1 03/13/2023 05:15 AM    BILITOT 0.5 03/13/2023 05:15 AM    ALKPHOS 116 03/13/2023 05:15 AM    AST 19 03/13/2023 05:15 AM    ALT 7 03/13/2023 05:15 AM     No results found for: LIPASE  No results found for: AMYLASE      ASSESSMENT/PLAN:  Patient Active Problem List   Diagnosis    End stage renal disease ([facility] [address]    Encounter regarding vascular access for dialysis for end-stage renal disease ([facility] [address]    Acute renal failure (ARF) ([facility] [address]     1. Cirrhosis  -Decompensated/nonalcoholic  -MELD cannot be calculated as no INR has been obtained  -Monitor MELD labs  -Ultrasound/Doppler ultrasound reveals patent portal/hepatic veins  -Nonelevated AFP     2. Anemia  -Acute on chronic  -Iron deficient  -No evidence of overt bleed but decreased H&H/positive FOBT  -Consider repeat upper endoscopy as inpatient if further decrease in H&H or overt bleed  -Consider nuclear medicine bleeding scan since repeat upper endoscopy      3. Ascites  -S/P paracentesis -unknown amount removed as noted is pending  -Ascitic fluid analysis not consistent with SBP  -Defer diuretics to admitting/nephrology  -Plan to repeat paracentesis today      4. Renal disease  -CKD stage IV-V  -Per admitting/pertinent consultants     Above has been discussed with patient and her son at bedside and all questions answered to their satisfaction. They verbalized understanding and agreement with the plan as delineated    Fahad Agosto MD  3/13/2023  11:03 AM    NOTE:  This report was transcribed using voice recognition software. Every effort was made to ensure accuracy; however, inadvertent computerized transcription errors may be present.

## 2023-03-14 ENCOUNTER — ANESTHESIA EVENT (OUTPATIENT)
Dept: ENDOSCOPY | Age: 73
End: 2023-03-14
Payer: MEDICARE

## 2023-03-14 ENCOUNTER — ANESTHESIA (OUTPATIENT)
Dept: ENDOSCOPY | Age: 73
End: 2023-03-14
Payer: MEDICARE

## 2023-03-14 ENCOUNTER — APPOINTMENT (OUTPATIENT)
Dept: INTERVENTIONAL RADIOLOGY/VASCULAR | Age: 73
End: 2023-03-14
Attending: FAMILY MEDICINE
Payer: MEDICARE

## 2023-03-14 LAB
ALBUMIN FLUID: 1.2 G/DL
ALBUMIN SERPL-MCNC: 3.2 G/DL (ref 3.5–5.2)
ALP BLD-CCNC: 133 U/L (ref 35–104)
ALT SERPL-CCNC: 8 U/L (ref 0–32)
ANION GAP SERPL CALCULATED.3IONS-SCNC: 13 MMOL/L (ref 7–16)
APPEARANCE FLUID: CLEAR
AST SERPL-CCNC: 26 U/L (ref 0–31)
BASOPHILS ABSOLUTE: 0.01 E9/L (ref 0–0.2)
BASOPHILS RELATIVE PERCENT: 0.3 % (ref 0–2)
BILIRUB SERPL-MCNC: 0.5 MG/DL (ref 0–1.2)
BUN BLDV-MCNC: 66 MG/DL (ref 6–23)
CALCIUM IONIZED: 1.13 MMOL/L (ref 1.15–1.33)
CALCIUM SERPL-MCNC: 8 MG/DL (ref 8.6–10.2)
CELL COUNT FLUID TYPE: NORMAL
CHLORIDE BLD-SCNC: 100 MMOL/L (ref 98–107)
CO2: 24 MMOL/L (ref 22–29)
COLOR FLUID: YELLOW
CREAT SERPL-MCNC: 6.5 MG/DL (ref 0.5–1)
EOSINOPHILS ABSOLUTE: 0.08 E9/L (ref 0.05–0.5)
EOSINOPHILS RELATIVE PERCENT: 2.3 % (ref 0–6)
FLUID TYPE: NORMAL
GFR SERPL CREATININE-BSD FRML MDRD: 6 ML/MIN/1.73
GLUCOSE BLD-MCNC: 80 MG/DL (ref 74–99)
HCT VFR BLD CALC: 24.6 % (ref 34–48)
HEMOGLOBIN: 7.6 G/DL (ref 11.5–15.5)
IMMATURE GRANULOCYTES #: 0.02 E9/L
IMMATURE GRANULOCYTES %: 0.6 % (ref 0–5)
LYMPHOCYTES ABSOLUTE: 0.4 E9/L (ref 1.5–4)
LYMPHOCYTES RELATIVE PERCENT: 11.5 % (ref 20–42)
MAGNESIUM: 2.5 MG/DL (ref 1.6–2.6)
MCH RBC QN AUTO: 28.8 PG (ref 26–35)
MCHC RBC AUTO-ENTMCNC: 30.9 % (ref 32–34.5)
MCV RBC AUTO: 93.2 FL (ref 80–99.9)
MONOCYTE, FLUID: 76 %
MONOCYTES ABSOLUTE: 0.38 E9/L (ref 0.1–0.95)
MONOCYTES RELATIVE PERCENT: 10.9 % (ref 2–12)
NEUTROPHIL, FLUID: 24 %
NEUTROPHILS ABSOLUTE: 2.6 E9/L (ref 1.8–7.3)
NEUTROPHILS RELATIVE PERCENT: 74.4 % (ref 43–80)
NUCLEATED CELLS FLUID: 76 /UL
PDW BLD-RTO: 14.2 FL (ref 11.5–15)
PHOSPHORUS: 4.8 MG/DL (ref 2.5–4.5)
PLATELET # BLD: 56 E9/L (ref 130–450)
PLATELET CONFIRMATION: NORMAL
PMV BLD AUTO: 13.3 FL (ref 7–12)
POLYCHROMASIA: ABNORMAL
POTASSIUM SERPL-SCNC: 3.8 MMOL/L (ref 3.5–5)
PROTEIN FLUID: 1.8 G/DL
RBC # BLD: 2.64 E12/L (ref 3.5–5.5)
RBC FLUID: <2000 /UL
SODIUM BLD-SCNC: 137 MMOL/L (ref 132–146)
TOTAL PROTEIN: 5.6 G/DL (ref 6.4–8.3)
WBC # BLD: 3.5 E9/L (ref 4.5–11.5)

## 2023-03-14 PROCEDURE — 2580000003 HC RX 258: Performed by: NURSE ANESTHETIST, CERTIFIED REGISTERED

## 2023-03-14 PROCEDURE — 84100 ASSAY OF PHOSPHORUS: CPT

## 2023-03-14 PROCEDURE — C1729 CATH, DRAINAGE: HCPCS

## 2023-03-14 PROCEDURE — 6360000002 HC RX W HCPCS: Performed by: NURSE ANESTHETIST, CERTIFIED REGISTERED

## 2023-03-14 PROCEDURE — 84157 ASSAY OF PROTEIN OTHER: CPT

## 2023-03-14 PROCEDURE — 7100000010 HC PHASE II RECOVERY - FIRST 15 MIN: Performed by: INTERNAL MEDICINE

## 2023-03-14 PROCEDURE — 2500000003 HC RX 250 WO HCPCS: Performed by: INTERNAL MEDICINE

## 2023-03-14 PROCEDURE — 3609017100 HC EGD: Performed by: INTERNAL MEDICINE

## 2023-03-14 PROCEDURE — 88112 CYTOPATH CELL ENHANCE TECH: CPT

## 2023-03-14 PROCEDURE — C9113 INJ PANTOPRAZOLE SODIUM, VIA: HCPCS | Performed by: NURSE PRACTITIONER

## 2023-03-14 PROCEDURE — 0DJ08ZZ INSPECTION OF UPPER INTESTINAL TRACT, VIA NATURAL OR ARTIFICIAL OPENING ENDOSCOPIC: ICD-10-PCS | Performed by: INTERNAL MEDICINE

## 2023-03-14 PROCEDURE — 87205 SMEAR GRAM STAIN: CPT

## 2023-03-14 PROCEDURE — 0W9G3ZZ DRAINAGE OF PERITONEAL CAVITY, PERCUTANEOUS APPROACH: ICD-10-PCS | Performed by: INTERNAL MEDICINE

## 2023-03-14 PROCEDURE — 6370000000 HC RX 637 (ALT 250 FOR IP): Performed by: INTERNAL MEDICINE

## 2023-03-14 PROCEDURE — 82042 OTHER SOURCE ALBUMIN QUAN EA: CPT

## 2023-03-14 PROCEDURE — 36415 COLL VENOUS BLD VENIPUNCTURE: CPT

## 2023-03-14 PROCEDURE — 49083 ABD PARACENTESIS W/IMAGING: CPT

## 2023-03-14 PROCEDURE — 97530 THERAPEUTIC ACTIVITIES: CPT

## 2023-03-14 PROCEDURE — 6370000000 HC RX 637 (ALT 250 FOR IP): Performed by: HOSPITALIST

## 2023-03-14 PROCEDURE — 85025 COMPLETE CBC W/AUTO DIFF WBC: CPT

## 2023-03-14 PROCEDURE — 1200000000 HC SEMI PRIVATE

## 2023-03-14 PROCEDURE — 83735 ASSAY OF MAGNESIUM: CPT

## 2023-03-14 PROCEDURE — 88305 TISSUE EXAM BY PATHOLOGIST: CPT

## 2023-03-14 PROCEDURE — 97535 SELF CARE MNGMENT TRAINING: CPT

## 2023-03-14 PROCEDURE — 6360000002 HC RX W HCPCS: Performed by: NURSE PRACTITIONER

## 2023-03-14 PROCEDURE — 7100000011 HC PHASE II RECOVERY - ADDTL 15 MIN: Performed by: INTERNAL MEDICINE

## 2023-03-14 PROCEDURE — 3700000000 HC ANESTHESIA ATTENDED CARE: Performed by: INTERNAL MEDICINE

## 2023-03-14 PROCEDURE — 97110 THERAPEUTIC EXERCISES: CPT

## 2023-03-14 PROCEDURE — 6370000000 HC RX 637 (ALT 250 FOR IP): Performed by: NURSE PRACTITIONER

## 2023-03-14 PROCEDURE — 2709999900 HC NON-CHARGEABLE SUPPLY: Performed by: INTERNAL MEDICINE

## 2023-03-14 PROCEDURE — 80053 COMPREHEN METABOLIC PANEL: CPT

## 2023-03-14 PROCEDURE — 82330 ASSAY OF CALCIUM: CPT

## 2023-03-14 PROCEDURE — 87070 CULTURE OTHR SPECIMN AEROBIC: CPT

## 2023-03-14 RX ORDER — SODIUM CHLORIDE 9 MG/ML
INJECTION, SOLUTION INTRAVENOUS CONTINUOUS PRN
Status: DISCONTINUED | OUTPATIENT
Start: 2023-03-14 | End: 2023-03-14 | Stop reason: SDUPTHER

## 2023-03-14 RX ORDER — PROPOFOL 10 MG/ML
INJECTION, EMULSION INTRAVENOUS PRN
Status: DISCONTINUED | OUTPATIENT
Start: 2023-03-14 | End: 2023-03-14 | Stop reason: SDUPTHER

## 2023-03-14 RX ORDER — CALCIUM GLUCONATE 20 MG/ML
1000 INJECTION, SOLUTION INTRAVENOUS ONCE
Status: COMPLETED | OUTPATIENT
Start: 2023-03-14 | End: 2023-03-14

## 2023-03-14 RX ADMIN — CHOLESTYRAMINE 4 G: 4 POWDER, FOR SUSPENSION ORAL at 23:43

## 2023-03-14 RX ADMIN — PROPOFOL 25 MG: 10 INJECTION, EMULSION INTRAVENOUS at 16:43

## 2023-03-14 RX ADMIN — SODIUM BICARBONATE 650 MG: 650 TABLET ORAL at 09:01

## 2023-03-14 RX ADMIN — PROPOFOL 50 MG: 10 INJECTION, EMULSION INTRAVENOUS at 16:41

## 2023-03-14 RX ADMIN — FLUOXETINE 20 MG: 20 CAPSULE ORAL at 09:01

## 2023-03-14 RX ADMIN — SODIUM CHLORIDE: 900 INJECTION, SOLUTION INTRAVENOUS at 16:39

## 2023-03-14 RX ADMIN — CYANOCOBALAMIN TAB 1000 MCG 1000 MCG: 1000 TAB at 09:01

## 2023-03-14 RX ADMIN — HYDRALAZINE HYDROCHLORIDE 20 MG: 10 TABLET, FILM COATED ORAL at 09:01

## 2023-03-14 RX ADMIN — Medication 50 MG: at 09:01

## 2023-03-14 RX ADMIN — HYDRALAZINE HYDROCHLORIDE 20 MG: 10 TABLET, FILM COATED ORAL at 23:42

## 2023-03-14 RX ADMIN — CHOLESTYRAMINE 4 G: 4 POWDER, FOR SUSPENSION ORAL at 09:00

## 2023-03-14 RX ADMIN — FOLIC ACID 1 MG: 1 TABLET ORAL at 09:01

## 2023-03-14 RX ADMIN — CALCIUM GLUCONATE 1000 MG: 20 INJECTION, SOLUTION INTRAVENOUS at 17:47

## 2023-03-14 RX ADMIN — AMLODIPINE BESYLATE 20 MG: 10 TABLET ORAL at 09:00

## 2023-03-14 RX ADMIN — PANTOPRAZOLE SODIUM 40 MG: 40 INJECTION, POWDER, FOR SOLUTION INTRAVENOUS at 23:42

## 2023-03-14 RX ADMIN — POLYETHYLENE GLYCOL 3350 17 G: 17 POWDER, FOR SOLUTION ORAL at 09:00

## 2023-03-14 RX ADMIN — LEVOTHYROXINE SODIUM 100 MCG: 100 TABLET ORAL at 06:14

## 2023-03-14 RX ADMIN — BUMETANIDE 2 MG: 0.25 INJECTION INTRAMUSCULAR; INTRAVENOUS at 23:42

## 2023-03-14 RX ADMIN — BUMETANIDE 2 MG: 0.25 INJECTION INTRAMUSCULAR; INTRAVENOUS at 17:50

## 2023-03-14 RX ADMIN — OLANZAPINE 2.5 MG: 5 TABLET, ORALLY DISINTEGRATING ORAL at 23:42

## 2023-03-14 RX ADMIN — BUMETANIDE 2 MG: 0.25 INJECTION INTRAMUSCULAR; INTRAVENOUS at 06:14

## 2023-03-14 RX ADMIN — SODIUM BICARBONATE 650 MG: 650 TABLET ORAL at 23:42

## 2023-03-14 RX ADMIN — PANTOPRAZOLE SODIUM 40 MG: 40 INJECTION, POWDER, FOR SOLUTION INTRAVENOUS at 10:54

## 2023-03-14 RX ADMIN — CHOLESTYRAMINE 4 G: 4 POWDER, FOR SUSPENSION ORAL at 00:13

## 2023-03-14 ASSESSMENT — PAIN SCALES - GENERAL
PAINLEVEL_OUTOF10: 0
PAINLEVEL_OUTOF10: 0

## 2023-03-14 NOTE — PROGRESS NOTES
**Patient off floor and not able to be seen    74year old female with cirrhosis, anemia due to CKD, folate deficiency, hx of pernicious anemia(corrected) admitted with occult + stools, GI bleed.     Today Hb 7.6, transfuse when <7  Patient off floor with EGD  Cbc daily (iron and B12 stores adequate), continue daily folic acid  F/u with Dr. Edd Blackwell at discharge for aranesp support    Desma Nageotte, MD

## 2023-03-14 NOTE — PROGRESS NOTES
OCCUPATIONAL THERAPY TREATMENT NOTE     [facility] [employer] [facility]   [facility]        Date:3/14/2023  Patient Name: Trinidad Meyer  MRN: [id number]  : 1950  Room: [facility]2918-74     Evaluating OT: Paula Minors OTR/L; [id number]      Referring Provider and Specific Provider Orders/Date:      23 111   OT eval and treat  Start:  23,   End:  23 111,   ONE TIME,   Standing Count:  1 Occurrences,   R         Erminio Sindy Vazquezel, DO       Placement Recommendation: Subacute         Diagnosis:   No diagnosis found.        Surgery: anticipate paracentesis d/t abdominal ascites       Pertinent Medical History:       Past Medical History           Past Medical History:   Diagnosis Date    Anemia       iron deficiency    Hypertension      Kidney failure              Past Surgical History             Past Surgical History:   Procedure Laterality Date    CT BIOPSY RENAL   11/10/2022     CT BIOPSY RENAL 11/10/2022 Juan Brady MD [facility]    DIALYSIS FISTULA CREATION Left 2/10/2023     CREATION ARTERIOVENOUS FISTULA LEFT ARM performed by Alex Luna MD at [facility]          Precautions:  Fall Risk, multiple picked area on the skin with scabbing, chronic kidney disease  Comment: had fallen at home this past Tuesday 3/7/23       Assessment of current deficits:     [x] Functional mobility            [x]ADLs           [x] Strength                   []Cognition    [x] Functional transfers          [x] IADLs          [] Safety Awareness   [x]Endurance    [] Fine Coordination              [x] Balance      [] Vision/perception    []Sensation      []Gross Motor Coordination  [] ROM           [] Delirium                   [] Motor Control      OT PLAN OF CARE   OT POC based on physician orders, patient diagnosis and results of clinical assessment     Frequency/Duration 1-3 days/wk for 2 weeks PRN      Specific OT Treatment Interventions to include:   * Instruction/training on adapted ADL techniques and AE recommendations to increase functional independence within precautions       * Training on energy conservation strategies, correct breathing pattern and techniques to improve independence/tolerance for self-care routine  * Functional transfer/mobility training/DME recommendations for increased independence, safety, and fall prevention  * Patient/Family education to increase follow through with safety techniques and functional independence  * Recommendation of environmental modifications for increased safety with functional transfers/mobility and ADLs  * Therapeutic exercise to improve motor endurance, ROM, and functional strength for ADLs/functional transfers  * Therapeutic activities to facilitate/challenge dynamic balance, stand tolerance for increased safety and independence with ADLs  * Positioning to improve skin integrity, interaction with environment and functional independence     Recommended Adaptive Equipment: TBD at rehab       Home Living: with son; single family home, 1 story, 1+3 steps to enter with B rails, laundry in the basement, walk in shower. Equipment owned: quad cane, built in shower chairs      Prior Level of Function: Independent with ADLs, son assists with cooking, laundry and medicine management. Needs assistance with IADLs; ambulated with no device, fell this past Tuesday and the son states she hasent being able to walk since. Pt states both of her \"knees are bad. \"      Driving: not since October 2022  Occupation: no working      Pain Level: no pain; Nursing notified.        Cognition: A&O: 3/4; Follows 2 step directions              Memory: good               Sequencing: good               Problem solving: fair               Judgement/safety: fair      [facility]   How much help is needed for putting on and taking off regular lower body clothing?: A Lot  How much help is needed for bathing (which includes washing, rinsing, drying)?: A Lot  How much help is needed for toileting (which includes using toilet, bedpan, or urinal)?: Total  How much help is needed for putting on and taking off regular upper body clothing?: A Lot  How much help is needed for taking care of personal grooming?: A Little  How much help for eating meals?: A Little  AM-[facility] Inpatient Daily Activity Raw Score: 13  AM-[facility] Inpatient ADL T-Scale Score : 32.03  ADL Inpatient CMS 0-100% Score: 63.03  ADL Inpatient CMS G-Code Modifier : CL     Functional Assessment:     Initial Eval Status  Date: 3/9/23 Treatment Status  Date: 3/14/23 STGs = LTGs  Time frame: 10-14 days   Feeding Supervision with set up  N/t  Independent    Grooming Moderate Assist to massage shampoo cap into scalp and towel dry. Moderate Assist to brush hair. Supervision with set up provided for oral care: brush teeth and rinse while seated in bedside chair. N/t Independent    UB Dressing Moderate Assist to doff and don hospital gown MOD A to aj/[facility] gown  Independent    LB Dressing Dependent  MAX A to aj depends, and pants with pt requiring assist to thread pants/depend over B LE, pull up over thighs, maintain standing blance to pull up around waist. V/c's for technique to complete dressing tasks. Supervision    Bathing Maximal Assist to sponge bathe while rolling in bed, power applied. MAX A simulated  Minimal Assist    Toileting Dependent for hygiene     MAX A pt incontinent of bowel requiring assist with hygiene and clothing management  Supervision    Bed Mobility  Supine to sit: Minimal Assist   Moderate assist to scoot out  Sit to supine: N/T as pt was up in chair  Supine>sit n/t  Sit>supine MOD A to bring B LE into bed v/c's for technique  Supine to sit: Independent   Sit to supine: Independent    Functional Transfers Minimal Assist from EOB to wheeled walker. Transfer training with verbal cues for hand placement throughout session to improve safety.   MOD A sit<>stand from chair to w/w Independent    Functional Mobility Minimal Assist with EOB to bedside chair to improve balance, verbal cues for walker sequence and safety. MIN A with w/w less than house hold distances v/c's for upright posture and technique  Modified Riley    Balance Sitting:     Static: good     Dynamic: fair   Standing: fair  with wheeled walker Sitting:     Static: supervision in chair     Dynamic: SBA   Standing: MIN A with w/w         Activity Tolerance fair  Fair / fair plus   good    Visual/  Perceptual Glasses: yes             Pt at bed level engaged in R LE ROM ex's 1 x 10 reps ankle pumps, knee/hip flexion/extension focusing on edema management to increase independence with ADL tasks. Comments: Upon arrival pt seated in chair with son present, agreeable to therapy session. Pt educated with regards to bed mobility, functional transfers, functional mobility, edema management, LE dressing technique, toileting/hygiene. At end of session pt supine in bed,  all lines and tubes intact, call light within reach. Pt has made fair  progress towards set goals.    Continue with current plan of care      Treatment Time In:1420            Treatment Time Out: 1147                Treatment Charges: Mins Units   Ther Ex  31165 10 1   Manual Therapy 64181     Thera Activities 21678 5    ADL/Home Mgt 44988 10 1   Neuro Re-ed 01621     Group Therapy      Orthotic manage/training  94171     Non-Billable Time     Total Timed Treatment 25 [address] [facility] [address]

## 2023-03-14 NOTE — CARE COORDINATION
Ss note: 3/14/885604:16 AM Will need Rapid Covid prior to discharge. Plan is [facility] for skilled rehab, possible discharge tomorrow, NO PRECERT, sujey Buitrago updated. Pt resides with her son. Pt has a Fistula, is being followed by Nephrology no outpt HD yet. Will need Hens and signed ELDA.  Jessica Osborne, NAVJOT

## 2023-03-14 NOTE — H&P
Patient seen and examined prior to procedure. No new complaints. Son at bedside. Pertinent notes/labs reviewed  Decreasing H&H after transfusion  H&P reviewed -no new changes except as described above  Vitals:    03/14/23 0900   BP: (!) 121/58   Pulse:    Resp:    Temp:    SpO2:      Plan: Proceed with upper endoscopy later today above has been discussed with patient and her son at bedside and all questions answered to their satisfaction. They both verbalized understanding and agreement with the plan including plan for endoscopy as described.   Please, see orders for plan of care    Jana Duron MD

## 2023-03-14 NOTE — ANESTHESIA PRE PROCEDURE
Department of Anesthesiology  Preprocedure Note       Name:  Carlyn Mckinney   Age:  74 y.o.  :  1950                                          MRN:  [id number]         Date:  3/14/2023      Surgeon: Donnell Carrillo):  Cari Mccabe MD    Procedure: Procedure(s):  EGD ESOPHAGOGASTRODUODENOSCOPY    Medications prior to admission:   Prior to Admission medications    Medication Sig Start Date End Date Taking? Authorizing Provider   FLUoxetine (PROZAC) 20 MG capsule Take 20 mg by mouth daily    Historical Provider, MD   amLODIPine (NORVASC) 10 MG tablet Take 20 mg by mouth daily    Historical Provider, MD   omeprazole (PRILOSEC) 20 MG delayed release capsule Take 20 mg by mouth daily 23   Historical Provider, MD   furosemide (LASIX) 20 MG tablet Take 20 mg by mouth daily 10/25/22   Historical Provider, MD   hydrALAZINE (APRESOLINE) 10 MG tablet 20 mg  in the morning and 20 mg in the evening. 10/25/22   Historical Provider, MD   OLANZapine (ZYPREXA) 2.5 MG tablet Take 2.5 mg by mouth nightly 22   Historical Provider, MD   vitamin B-12 (CYANOCOBALAMIN) 1000 MCG tablet Take 1,000 mcg by mouth daily    Historical Provider, MD   ferrous sulfate (IRON 325) 325 (65 Fe) MG tablet Take 325 mg by mouth three times a week [id number]    Historical Provider, MD   sodium bicarbonate 325 MG tablet Take 650 mg by mouth 2 times daily  Patient not taking: No sig reported    Historical Provider, MD   zinc gluconate 50 MG tablet Take 50 mg by mouth daily    Historical Provider, MD       Current medications:    No current facility-administered medications for this visit. No current outpatient medications on file.      Facility-Administered Medications Ordered in Other Visits   Medication Dose Route Frequency Provider Last Rate Last Admin    calcium gluconate 1,000 mg in sodium chloride 50 mL  1,000 mg IntraVENous Once Quentin Dupree MD        pantoprazole (PROTONIX) injection 40 mg  40 mg IntraVENous BID Tj Foster Genice Isaiah, QUINTEN - CNP   40 mg at 03/13/23 2351    albumin human 25 % IV solution 50 g  50 g IntraVENous Once PRN QUINTEN Chavarria - CNP        bumetanide (BUMEX) injection 2 mg  2 mg IntraVENous Q8H Andreas Retana MD   2 mg at 03/14/23 4864    0.9 % sodium chloride infusion   IntraVENous PRN Rj Wayne, DO        polyethylene glycol (GLYCOLAX) packet 17 g  17 g Oral Daily Fausto Villaseñor MD   17 g at 03/14/23 0900    cholestyramine (QUESTRAN) packet 4 g  1 packet Oral BID Brown Wayne, DO   4 g at 84/76/36 2576    folic acid (FOLVITE) tablet 1 mg  1 mg Oral Daily Michelle NajeraQUINTEN CNP   1 mg at 03/14/23 0901    colchicine (COLCRYS) tablet 0.6 mg  0.6 mg Oral Every Other Day Geovany Olivas, DO   0.6 mg at 03/13/23 0853    levothyroxine (SYNTHROID) tablet 100 mcg  100 mcg Oral Daily Brown Wayne, DO   100 mcg at 03/14/23 4836    ferrous sulfate (IRON 325) tablet 325 mg  325 mg Oral Once per day on Mon Wed Fri Brown Wayne, DO   325 mg at 03/13/23 0853    amLODIPine (NORVASC) tablet 20 mg  20 mg Oral Daily Rj Wayne, DO   20 mg at 03/14/23 0900    FLUoxetine (PROZAC) capsule 20 mg  20 mg Oral Daily Rj Wayne DO   20 mg at 03/14/23 0901    hydrALAZINE (APRESOLINE) tablet 20 mg  20 mg Oral BID Rj Wayne, DO   20 mg at 03/14/23 0901    OLANZapine zydis (ZYPREXA) disintegrating tablet 2.5 mg  2.5 mg Oral Nightly Rj Wayne DO   2.5 mg at 03/13/23 2351    sodium bicarbonate tablet 650 mg  650 mg Oral BID Rj Wayne DO   650 mg at 03/14/23 0901    vitamin B-12 (CYANOCOBALAMIN) tablet 1,000 mcg  1,000 mcg Oral Daily Rj Wayne DO   1,000 mcg at 03/14/23 0901    zinc gluconate tablet 50 mg  50 mg Oral Daily Rj Wayne DO   50 mg at 03/14/23 0901    [Held by provider] pantoprazole (PROTONIX) tablet 40 mg  40 mg Oral QAM  Rj Wayne DO   40 mg at 03/13/23 0607    perflutren lipid microspheres (DEFINITY) injection 1.5 mL  1.5 mL IntraVENous ONCE PRN Kathryn Georges DO        vitamin D (ERGOCALCIFEROL) capsule 50,000 Units  50,000 Units Oral Weekly Adrray Mcqueen DO [place]   50,000 Units at 03/09/23 0506       Allergies:  No Known Allergies    Problem List:    Patient Active Problem List   Diagnosis Code    End stage renal disease ([facility] N18.6    Encounter regarding vascular access for dialysis for end-stage renal disease ([facility] N18.6, Z99.2    Acute renal failure (ARF) (HCC) N17.9       Past Medical History:        Diagnosis Date    Anemia     iron deficiency    Hypertension     Kidney failure        Past Surgical History:        Procedure Laterality Date    CT BIOPSY RENAL  11/10/2022    CT BIOPSY RENAL 11/10/2022 Juan Franklin MD [facility]    DIALYSIS FISTULA CREATION Left 2/10/2023    CREATION ARTERIOVENOUS FISTULA LEFT ARM performed by Esha Hernandez MD at [address] History:    Social History     Tobacco Use    Smoking status: Never    Smokeless tobacco: Never   Substance Use Topics    Alcohol use: Yes     Comment: rare                                Counseling given: Not Answered      Vital Signs (Current): There were no vitals filed for this visit.                                            BP Readings from Last 3 Encounters:   03/14/23 (!) 108/54   02/10/23 (!) 144/63   11/10/22 (!) 176/76       NPO Status:                                                                                 BMI:   Wt Readings from Last 3 Encounters:   03/09/23 184 lb (83.5 kg)   02/27/23 178 lb (80.7 kg)   02/10/23 167 lb (75.8 kg)     There is no height or weight on file to calculate BMI.    CBC:   Lab Results   Component Value Date/Time    WBC 3.5 03/14/2023 05:52 AM    RBC 2.64 03/14/2023 05:52 AM    HGB 7.6 03/14/2023 05:52 AM    HCT 24.6 03/14/2023 05:52 AM    MCV 93.2 03/14/2023 05:52 AM    RDW 14.2 03/14/2023 05:52 AM    PLT 56 03/14/2023 05:52 AM       CMP:   Lab Results   Component Value Date/Time     03/14/2023 05:52 AM    K 3.8 03/14/2023 05:52 AM    K 3.9 02/10/2023 07:40 AM     03/14/2023 05:52 AM    CO2 24 03/14/2023 05:52 AM    BUN 66 03/14/2023 05:52 AM    CREATININE 6.5 03/14/2023 05:52 AM    LABGLOM 6 03/14/2023 05:52 AM    GLUCOSE 80 03/14/2023 05:52 AM    PROT 5.6 03/14/2023 05:52 AM    CALCIUM 8.0 03/14/2023 05:52 AM    BILITOT 0.5 03/14/2023 05:52 AM    ALKPHOS 133 03/14/2023 05:52 AM    AST 26 03/14/2023 05:52 AM    ALT 8 03/14/2023 05:52 AM       POC Tests: No results for input(s): POCGLU, POCNA, POCK, POCCL, POCBUN, POCHEMO, POCHCT in the last 72 hours. Coags:   Lab Results   Component Value Date/Time    PROTIME 13.4 02/10/2023 07:40 AM    INR 1.2 02/10/2023 07:40 AM       HCG (If Applicable): No results found for: PREGTESTUR, PREGSERUM, HCG, HCGQUANT     ABGs: No results found for: PHART, PO2ART, LEE4PKC, GCE1IFR, BEART, Z4PZICMM     Type & Screen (If Applicable):  No results found for: LABABO, LABRH    Drug/Infectious Status (If Applicable):  No results found for: HIV, HEPCAB    COVID-19 Screening (If Applicable): No results found for: COVID19        Anesthesia Evaluation  Patient summary reviewed and Nursing notes reviewed no history of anesthetic complications:   Airway: Mallampati: III  TM distance: >3 FB   Neck ROM: full  Mouth opening: > = 3 FB   Dental:      Comment: None loose    Pulmonary:Negative Pulmonary ROS and normal exam  breath sounds clear to auscultation                             Cardiovascular:  Exercise tolerance: poor (<4 METS),   (+) hypertension:, hyperlipidemia        Rhythm: regular  Rate: normal                    Neuro/Psych:   (+) psychiatric history:depression/anxiety             GI/Hepatic/Renal:   (+) GERD:, renal disease: CRI and ESRD,           Endo/Other:    (+) blood dyscrasia (  Anemia iron deficiency ): anemia, arthritis: OA., .                 Abdominal:   (+) obese,           Vascular:           Other Findings:             Anesthesia Plan      MAC     ASA 4       Induction: intravenous. MIPS: Prophylactic antiemetics administered. Anesthetic plan and risks discussed with patient. Plan discussed with CRNA.                     Rosanne Butcher MD   3/14/2023

## 2023-03-14 NOTE — PROGRESS NOTES
Dr. Marley Rivera talked to patient and family about findings from CT urogram. Patient staying until she can see urology.

## 2023-03-14 NOTE — OP NOTE
Operative Note      Patient: Ana Metzger  YOB: 1950  MRN: [id number]    Date of Procedure: 3/14/2023    Pre-Op Diagnosis: Anemia, unspecified type [D64.9]    Post-Op Diagnosis:  gastritis       Procedure(s):  EGD ESOPHAGOGASTRODUODENOSCOPY    Surgeon(s):  Odilia Casarez MD    Assistant:   Surgical Assistant: Isabel Brush RN    Anesthesia: Monitor Anesthesia Care    Estimated Blood Loss (mL): none    Complications: None    Specimens:   * No specimens in log *    Implants:  * No implants in log *      Drains:   Urinary Catheter 03/08/23 [place] (Active)   Catheter Indications Need for fluid volume management of the critically ill patient in a critical care setting 03/14/23 0800   Site Assessment [place] 03/14/23 0800   Urine Color Yellow;Straw 03/14/23 0502   Urine Appearance Cloudy 03/14/23 0502   Collection Container Standard 03/14/23 0502   Securement Method Leg strap 03/14/23 0502   Catheter Care  Soap and water 03/13/23 0905   Catheter Best Practices  Catheter secured to thigh 03/14/23 0502   Status Draining;Patent 03/14/23 0502   Output (mL) 250 mL 03/14/23 0323       Findings: see below    Detailed Description of Procedure: EGD  Indication anemia    Sedation  MAC    Endoscope was advanced easily through mouth to second portion of duodenum      Oropharynx views are limited but grossly normal.    Esophagus:   Mucosa is normal.  GEJ at 38 cm. No gastric varices    Stomach:   No blood in stomach. No gastric varices. Mildly gastritic mucosa with features of mild portal gastropathy. Duodenum: No blood in duodenum. Mucosa is grossly normal, small polyp in bulb likely gland hyperplasia. IMPRESSION AND PLAN: No source of bleeding, no varices in esophagus or stomach on upper endoscopy. Resume diet. Will follow.       Electronically signed by Odilia Casarez MD on 3/14/2023 at 4:43 PM

## 2023-03-14 NOTE — PROGRESS NOTES
Physical Therapy Treatment Note/Plan of Care    Room #:  4901/0802-29  Patient Name: Rocco López  YOB: 1950  MRN: [id number]    Date of Service: 3/14/2023     Tentative placement recommendation: Subacute Rehab  Equipment recommendation: Gerry Parra      Evaluating Physical Therapist: Devi Garcia, PT #[id number]      Specific Provider Orders/Date/Referring Provider : 03/08/23 1115    PT eval and treat  Start:  03/08/23 1115,   End:  03/08/23 1115,   ONE TIME,   Standing Count:  1 Occurrences,   R         Alfonzo White DO      Admitting Diagnosis:   Acute renal failure (ARF) ([facility] [N17.9]     Ladonna Galindo yesterday-lost her balance- legs are too weak to support her    Went to [facility]- also has \"fluid in her belly and fluid in her legs\"  Surgery: none      Patient Active Problem List   Diagnosis    End stage renal disease ([facility]    Encounter regarding vascular access for dialysis for end-stage renal disease ([facility]    Acute renal failure (ARF) ([facility]        ASSESSMENT of Current Deficits Patient exhibits decreased strength, balance, and endurance impairing functional mobility, transfers, gait , gait distance, and tolerance to activity. Posterior lean upon initial stand, improved with each stand. Tactile cues needed for walker control due to obstacle negotiation.  Patient requires continued skilled physical therapy to address concerns listed above for increased safety and function at discharge Pt to trial stairs on next session with PT.       PHYSICAL THERAPY  PLAN OF CARE       Physical therapy plan of care is established based on physician order,  patient diagnosis and clinical assessment    Current Treatment Recommendations:    -Bed Mobility: Lower extremity exercises   -Sitting Balance: Incorporate reaching activities to activate trunk muscles   -Standing Balance: Perform strengthening exercises in standing to promote motor control with or without upper extremity support   -Transfers: Provide instruction on proper hand and foot position for adequate transfer of weight onto lower extremities and use of gait device if needed and Cues for hand placement, technique and safety. Provide stabilization to prevent fall   -Gait: Gait training, Standing activities to improve: base of support, weight shift, weight bearing , and Pregait training to emphasize: Sequencing , Device control, Safety, and pacing   -Endurance: Utilize Supervised activities to increase level of endurance to allow for safe functional mobility including transfers and gait  and Use graduated activities to promote good breathing techniques and provide support and education to maximize respiratory function    PT long term treatment goals are located in below grid    Patient and or family understand(s) diagnosis, prognosis, and plan of care. Frequency of treatments: Patient will be seen  daily. Prior Level of Function: Patient ambulated independently    Rehab Potential: good - for baseline    Past medical history:   Past Medical History:   Diagnosis Date    Anemia     iron deficiency    Hypertension     Kidney failure      Past Surgical History:   Procedure Laterality Date    CT BIOPSY RENAL  11/10/2022    CT BIOPSY RENAL 11/10/2022 Juan Lee MD [facility]    DIALYSIS FISTULA CREATION Left 2/10/2023    CREATION ARTERIOVENOUS FISTULA LEFT ARM performed by Casie Mckinney MD at [address]:    Precautions:    , falls ,      Imaging results: No results found. Social history: Patient lives with son in a ranch home  with 3 steps, bilateral rails  to enter home.    Walk in shower  , built in shower chair     Equipment owned: Cane and Quad cane,       AM-PAC Basic Mobility        AM-PAC Basic Mobility - Inpatient   How much help is needed turning from your back to your side while in a flat bed without using bedrails?: A Little  How much help is needed moving from lying on your back to sitting on the side of a flat bed without using bedrails?: A Lot  How much help is needed moving to and from a bed to a chair?: A Little  How much help is needed standing up from a chair using your arms?: A Lot  How much help is needed walking in hospital room?: A Little  How much help is needed climbing 3-5 steps with a railing?: A Lot  AM-PAC Inpatient Mobility Raw Score : 15  AM-PAC Inpatient T-Scale Score : 39.45  Mobility Inpatient CMS 0-100% Score: 57.7  Mobility Inpatient CMS G-Code Modifier : CK    Nursing cleared patient for PT treatment. OBJECTIVE;   Initial Evaluation  Date: 3/9/2023 Treatment Date:    3/14/2023   Short Term/ Long Term   Goals   Was pt agreeable to Eval/treatment? Yes Yes  To be met in 3 days   Pain level   0/10    No pain     Bed Mobility    Rolling: Not assessed patient in chair     Rolling: Minimal assist of 1   Supine to sit: Moderate assist of 1   Sit to supine: Not assessed patient in chair   Scooting: Supervision     Rolling: Independent    Supine to sit: Independent    Sit to supine: Independent    Scooting: Independent     Transfers Sit to stand: Minimal assist of 1 Cues for hand placement and safety  Sit to stand: Moderate assist of 1 cues for hand placement   Sit to stand: Supervision      Ambulation     2 x 20 feet using  wheeled walker with Minimal assist of 1   for walker control and cues for walker approximation, safety, and pacing 20 feet and 40 feet using  wheeled walker with Minimal assist of 1   for walker approximation, balance, and safety obstacle negotiation. 2 x 50 feet using  wheeled walker with Supervision     Stair negotiation: ascended and descended   Not assessed  Not assessed   Try and attempt next session if appropriate.   4 stairs with 1 HR with Supervision   ROM Within functional limits    Increase range of motion 10% of affected joints    Strength BUE:  refer to OT eval  RLE:  3+/5  LLE:  3+/5  Increase strength in affected mm groups by 1/3 grade   Balance Sitting EOB:  not assessed    Dynamic Standing:  fair wheeled walker  Sitting EOB: fair   Dynamic Standing: fair    Sitting EOB:  good    Dynamic Standing: good -wheeled walker      Patient is Alert & Oriented x person, place, time, and situation and follows directions    Sensation:  Patient  denies numbness/tingling   Edema:  yes bilateral lower extremities   Endurance: fair       Vitals: room air   Blood Pressure at rest  Blood Pressure during session    Heart Rate at rest  Heart Rate during session    SPO2 at rest %  SPO2 during session %     Patient education  Patient educated on role of Physical Therapy, risks of immobility, safety and plan of care,  importance of mobility while in hospital , ankle pumps, quad set and glut set for edema control, blood clot prevention, and positioning for skin integrity and comfort     Patient response to education:   Pt verbalized understanding Pt demonstrated skill Pt requires further education in this area   Yes Partial Yes      Treatment:  Patient practiced and was instructed/facilitated in the following treatment: rolled multiple times d/t bowel movement. Therapist assist for hygiene and chux change. Sat edge of bed 5 minutes with Supervision  to increase dynamic sitting balance and activity tolerance. Stood ambulated as above in chart and performed seated exercise. Assisted back to chair. Therapeutic Exercises:  ankle pumps, heel raises, hip abduction/adduction, and seated marching  x 15 reps. At end of session, patient in chair with  son present  call light and phone within reach,  all lines and tubes intact, nursing notified. Patient would benefit from continued skilled Physical Therapy to improve functional independence and quality of life.          Patient's/ family goals   get stronger    Time in  120  Time out  145    Total Treatment Time  25 minutes    CPT codes:    Therapeutic activities (52382)   16 minutes  1 unit(s)  Therapeutic exercises (71942)   9 minutes  1 unit(s)    Izard Free, [facility] XMP#[id number]

## 2023-03-14 NOTE — ANESTHESIA POSTPROCEDURE EVALUATION
Department of Anesthesiology  Postprocedure Note    Patient: Dominique Colon  MRN: [id number]  YOB: 1950  Date of evaluation: 3/14/2023      Procedure Summary     Date: 03/14/23 Room / Location: [address] [place] [address] 01 / [facility]    Anesthesia Start: 6429 Anesthesia Stop: 1644    Procedure: EGD ESOPHAGOGASTRODUODENOSCOPY Diagnosis:       Anemia, unspecified type      (Anemia, unspecified type [D64.9])    Surgeons: Diogo Prajapati MD Responsible Provider: Radha Nixon MD    Anesthesia Type: MAC ASA Status: 4          Anesthesia Type: MAC    Jt Phase I:      Jt Phase II: Jt Score: 10      Anesthesia Post Evaluation    Patient location during evaluation: PACU  Patient participation: complete - patient participated  Level of consciousness: awake  Pain score: 0  Airway patency: patent  Nausea & Vomiting: no nausea and no vomiting  Complications: no  Cardiovascular status: hemodynamically stable  Respiratory status: acceptable  Hydration status: euvolemic

## 2023-03-14 NOTE — PROGRESS NOTES
Nephrology Progress Note  3/14/2023 1:16 PM  Subjective:   Admit Date: 3/8/2023  PCP: Kimberly Sol MD    Interval History: I was unable to see the patient. I made 2 attempts to see her in her room 11:52 AM and 12:39 PM with no success. She was in a procedure. Her son was present in the room both times. He has been in her room every day. He is aware of her condition. Case was discussed with him. Data below was reviewed. Diet: Diet NPO Exceptions are: Sips of Water with Meds    Data:     Scheduled Meds:   pantoprazole  40 mg IntraVENous BID    bumetanide  2 mg IntraVENous Q8H    polyethylene glycol  17 g Oral Daily    cholestyramine  1 packet Oral BID    folic acid  1 mg Oral Daily    colchicine  0.6 mg Oral Every Other Day    levothyroxine  100 mcg Oral Daily    ferrous sulfate  325 mg Oral Once per day on Mon Wed Fri    amLODIPine  20 mg Oral Daily    FLUoxetine  20 mg Oral Daily    hydrALAZINE  20 mg Oral BID    OLANZapine zydis  2.5 mg Oral Nightly    sodium bicarbonate  650 mg Oral BID    vitamin B-12  1,000 mcg Oral Daily    zinc gluconate  50 mg Oral Daily    [Held by provider] pantoprazole  40 mg Oral QAM AC    vitamin D  50,000 Units Oral Weekly     Continuous Infusions:   sodium chloride       PRN Meds:albumin human, sodium chloride, perflutren lipid microspheres  I/O last 3 completed shifts: In: 60 [P.O.:60]  Out: 1800 [Urine:1800]  I/O this shift:   In: 0   Out: 250 [Urine:250]    Intake/Output Summary (Last 24 hours) at 3/14/2023 1316  Last data filed at 3/14/2023 0838  Gross per 24 hour   Intake 60 ml   Output 1450 ml   Net -1390 ml     CBC:   Recent Labs     03/12/23  0548 03/13/23  0515 03/14/23  0552   WBC 2.4* 4.2* 3.5*   HGB 6.5* 8.1* 7.6*   PLT 53* 58* 56*     BMP:    Recent Labs     03/12/23  0548 03/13/23  0515 03/14/23  0552    137 137   K 3.8 4.0 3.8    100 100   CO2 23 23 24   BUN 60* 62* 66*   CREATININE 6.1* 6.1* 6.5*   GLUCOSE 91 76 80     Hepatic:   Recent Labs     03/12/23  0548 03/13/23  0515 03/14/23  0552   AST 16 19 26   ALT 6 7 8   BILITOT 0.4 0.5 0.5   ALKPHOS 107* 116* 133*     Protein/ Albumin:    Lab Results   Component Value Date    LABALBU 3.2 (L) 03/14/2023      Imaging Results          Procedure Component Value Ref Range Date/Time     US GUIDED PARACENTESIS [[id number]] Collected: 03/10/23 1652     Order Status: Completed Updated: 03/10/23 1655     Narrative:       PROCEDURE:   PARACENTESIS WITHOUT IMAGE GUIDANCE     US ABDOMEN LIMITED     3/9/2023     HISTORY:   ORDERING SYSTEM PROVIDED HISTORY: ascites   TECHNOLOGIST PROVIDED HISTORY:   Reason for exam:->ascites     TECHNIQUE:   Informed consent was obtained after a detailed explanation of the procedure   including risks, benefits, and alternatives. Universal protocol was   followed. A limited ultrasound of the abdomen was performed. The right   abdomen was prepped and draped in sterile fashion and local anesthesia was   achieved with lidocaine. An 8 [place] needle sheath was advanced into ascites   and paracentesis was performed. The patient tolerated the procedure well. FINDINGS:   Limited ultrasound of the abdomen demonstrates ascites. A total of 7150 cc   was removed. Impression:       Successful paracentesis. US DUP ABD PEL RETRO SCROT LIMITED [[id number]] Collected: 03/10/23 1614     Order Status: Completed Updated: 03/10/23 1618     Narrative:       EXAMINATION:   DOPPLER EVALUATION OF THE PELVIS     3/10/2023 1:26 pm     COMPARISON:   None. HISTORY:   ORDERING SYSTEM PROVIDED HISTORY: Portal hepatic veins/cirrhosis   TECHNOLOGIST PROVIDED HISTORY:   Reason for exam:->Portal hepatic veins/cirrhosis   What reading provider will be dictating this exam?->CRC     FINDINGS:   Cirrhotic liver. Moderate perihepatic ascites. Borderline gallbladder wall   thickening up to 0.4 cm, which could relate to chronic liver disease. No   shadowing gallstone.   Negative sonographic Munson's sign.  No significant   bile duct dilation with the common duct is 0.7 cm. Pancreas not well   visualized on this study. Right kidney grossly unremarkable. Patent portal   vein, main hepatic artery, and hepatic veins. Impression:       Cirrhotic liver. Ascites. US LIVER [[id number]] Collected: 03/10/23 1606     Order Status: Completed Updated: 03/10/23 1617     Narrative:       EXAMINATION:   RIGHT UPPER QUADRANT ULTRASOUND     3/10/2023 1:25 pm     COMPARISON:   None. HISTORY:   ORDERING SYSTEM PROVIDED HISTORY: Cirrhosis/rule out mass   TECHNOLOGIST PROVIDED HISTORY:     Reason for exam:->Cirrhosis/rule out mass   What reading provider will be dictating this exam?->CRC     FINDINGS:   LIVER:  Cirrhotic liver. BILIARY SYSTEM:  Borderline gallbladder wall thickening 0.4 cm, which could   relate to chronic liver disease. No shadowing gallstone. Negative   sonographic Munson sign. Common bile duct is within normal limits measuring 0.7 cm. RIGHT KIDNEY: The right kidney is grossly unremarkable without evidence of   hydronephrosis. PANCREAS:  Pancreas not well visualized on this study. OTHER: Moderate perihepatic ascites. Portal vein patent. Hepatic artery   patent. Patent hepatic veins. Impression:       Cirrhotic liver. Ascites. IR US GUIDED PARACENTESIS [[id number]]      Order Status: Canceled      US RETROPERITONEAL COMPLETE [[id number]] Collected: 03/08/23 1506     Order Status: Completed Updated: 03/08/23 1510     Narrative:       EXAMINATION:   RETROPERITONEAL ULTRASOUND OF THE KIDNEYS AND URINARY BLADDER     3/8/2023     COMPARISON:   None     HISTORY:   ORDERING SYSTEM PROVIDED HISTORY: MARY   TECHNOLOGIST PROVIDED HISTORY:     Reason for exam:->MARY   What reading provider will be dictating this exam?->CRC     FINDINGS:     Kidneys: The right kidney measures 10.7 cm in length and the left kidney measures 11.7   cm  in length.      Bilateral hyperechoic kidneys. No evidence of hydronephrosis or intrarenal   stones. Multiple bilateral renal cysts with the largest measuring 2.7 cm   involving the mid zone of the left kidney. Bladder:     Wilkerson catheter in a decompressed urinary bladder. MISCELLANEOUS: Large volume ascites      Impression:       Multiple bilateral renal cysts with the largest measuring 2.7 cm. Large volume ascites. Bilateral hyperechoic kidneys. ECHO 3/09/2023    Normal left ventricular chamber size. Normal left ventricular systolic function, EF 40%. Mild left ventricular concentric hypertrophy noted. Stage II diastolic dysfunction. Left atrium is enlarged. Increased left atrial volume. Interatrial septum not well visualized but appears intact. Normal right ventricle size and function. There is mild mitral regurgitation. No mitral valve prolapse. Mild aortic stenosis - Mean gradient 18mmHg, peak 31mmHg, SELINA 1.7cm2 by   VTI, 1.6cm2 by Vmax, DLI 0.52. Mild aortic regurgitation, pressure 1/2 time 484ms. There is mild tricuspid regurgitation. Mild pulmonary hypertension, RVSP 45mmHg. Normal aortic root size. No evidence of pericardial effusion. Moderate to large pleural effusion. No intra cardiac mass or thrombus. No previous echo for comparison. Objective:     Vitals: BP (!) 118/46   Pulse 83   Temp 98 °F (36.7 °C) (Oral)   Resp 19   Ht 5' 2\" (1.575 m)   Wt 184 lb (83.5 kg)   SpO2 98%   BMI 33.65 kg/m²     Patient was not seen today. I was unable to see the patient. I made 2 attempts to see her in her room 11:52 AM and 12:39 PM with no success. She was in a procedure. Her son was present in the room both times. He has been in her room every day. He is aware of her condition. Case was discussed with him. Data above was reviewed. Assessment & Plan:     Stage V chronic kidney disease and hypervolemia not on kidney replacement therapy yet.   The patient has an immature left upper arm fistula scheduled for surgical revision on April 6. I did explain to her yesterday that at any point if she develops uremic symptoms we will insert a right IJ tunneled dialysis catheter to initiate hemodialysis. I also explained to her that the optimal scenario would be to wait until her arm access is ready to be used and try to avoid a tunneled dialysis catheter placement. She expressed understanding. Her son also expressed understanding. Over the past 24 hours, despite being on Bumex 2 mg IV 3 times daily her documented total urine output was 1.2 L and her last shift documented urine output was only 250 mL. With this, I think it is time to initiate kidney replacement therapy. She was agreeable to do so if needed yesterday and I discussed this plan with the son today. Patient had ascites requiring paracentesis. She may be a better candidate for peritoneal dialysis then hemodialysis as a kidney replacement therapy modality. Her son told me that they may not be able to do it as she is going to an extended care facility. This can be taken care of as an outpatient regarding modality of dialysis meanwhile I consulted IR today to place a tunneled dialysis catheter to initiate hemodialysis prior to discharge. Hypocalcemia: Mild. Supplemented. Patient has secondary hyperparathyroidism of kidney disease and she has severe hypovitaminosis D on supplementation    Hyperphosphatemia: Mild. Reflecting advanced stage V CKD. If Phosphorus is 5.5 or above I will add phosphate binder. Anemia: Severe: Appreciate hematology service input. Likely of advanced stage V CKD. Transfuse as needed    Thrombocytopenia: In the context of liver disease. Patient is followed by hematology service    Elevated TSH: Followed by primary service      This note was created using voice recognition software.     Electronically signed by Franck Maria MD on 3/14/2023 at 1:16 PM

## 2023-03-14 NOTE — PROGRESS NOTES
Patient here for ultrasound guided paracentesis. Instructions given and questions answered. Consent signed. Abdomen scanned and marked under the guidance of procedural Radiologist.     1142 start procedure /53 81 16 95% on room air    1201 end procedure /52 81 18 95% on room air     3450 cc drained of hazy light yellow colored ascites fluid. Dry sterile dressing of folded 4 x 4 and tegaderm to RLQ. Nurse to nurse called to [facility]. Specimen sent to lab. Nurse notified of need to send labels to lab. Patient transported back to 4th floor.

## 2023-03-14 NOTE — PROGRESS NOTES
Internal Medicine Progress Note    [facility]    Vince Figueroa., CHRIS.NABILONydiaI. Renetta Lyle D.O., GALINDO Brito D.O. Gerhardt Hirschfeld, MSN, APRN, NP-C  Celina Salmeron. Bobby Chavarria, MSN, APRN-CNP     Primary Care Physician: Ishan Mancia MD   Admitting Physician:  Vidya Morrow DO  Admission date and time: 3/8/2023  9:59 AM    Room:  [facility]  Admitting diagnosis: Acute renal failure (ARF) [facility]) [N17.9]    Patient Name: Tre Vazquez  MRN: [id number]    Date of Service: 3/14/2023     Subjective:  Kal Donato is a 74 y.o. female who was seen and examined today,3/14/2023, at the bedside. She continues to improve on a daily basis. We discussed plan for ultrasound and potential paracentesis today. She has been transition to intermittent IV Bumex by the nephrology team.  She voices no other additional complaints or concerns. No family present during my examination. Review of System:   Constitutional:   Denies fever or chills, weight loss or gain, positive for fatigue/malaise-improving  HEENT:   Denies ear pain, sore throat, sinus or eye problems. Cardiovascular:   Denies any chest pain, irregular heartbeats, or palpitations. Respiratory:   Denies shortness of breath, coughing, sputum production, hemoptysis, or wheezing. Gastrointestinal:   Denies nausea, vomiting, diarrhea, or constipation. Mild interval increase in distention. Genitourinary:    Denies any urgency, frequency, hematuria. Voiding  without difficulty with Wilkerson catheter. Extremities:   Positive for lower extremity swelling//edema which is improved from initial presentation but remains mild  Neurology:    Denies any headache or focal neurological deficits, generalized weakness and deconditioning without focal complaint  Psch:   Denies being anxious or depressed. Musculoskeletal:    Denies  myalgias, joint complaints or back pain. Integumentary:   Denies any rashes, ulcers, or excoriations. Denies bruising. Hematologic/Lymphatic:  Denies bruising or bleeding. Physical Exam:  No intake/output data recorded. Intake/Output Summary (Last 24 hours) at 3/14/2023 0757  Last data filed at 3/14/2023 0529  Gross per 24 hour   Intake 60 ml   Output 1200 ml   Net -1140 ml     I/O last 3 completed shifts: In: 61 [P.O.:60]  Out: 1800 [Urine:1800]  No data found. Vital Signs:   Blood pressure (!) 121/58, pulse 82, temperature 98.8 °F (37.1 °C), temperature source Oral, resp. rate 19, height 5' 2\" (1.575 m), weight 184 lb (83.5 kg), SpO2 93 %. General appearance:  Alert, responsive, oriented to person, place, and time. Acute on chronic ill appearance, no distress. Head:  Normocephalic. No masses, lesions or tenderness. Eyes:  PERRLA. EOMI. Sclera clear. Buccal mucosa moist.  Impaired vision. ENT:  Ears normal. Mucosa normal.  Neck:    Supple. Trachea midline. No thyromegaly. No JVD. No bruits. Heart:    Rhythm regular. Rate controlled. S1 and S2. Systolic murmur. Lungs:    Symmetrical.  Diminished bibasilar air exchange. Otherwise lungs are clear to auscultation bilaterally. No wheezes. No rhonchi. No rales. Abdomen:   Softly distended. Bowel sounds positive. No organomegaly or masses. Mild to moderate ascites. .  Extremities:    Peripheral pulses present. 1+ pitting edema the bilateral lower extremities. Edema/swelling of the upper extremities. AV fistula left upper extremity. Neurologic:    Alert x 3. Generally weak without focal deficit. Cranial nerves grossly intact. No focal weakness. Psych:   Behavior is normal. Mood appears normal. Speech is not rapid and/or pressured. Musculoskeletal:   No unilateral joint edema, erythema or warmth. .  Gait not assessed. Integumentary:  Patient has new open areas as well as old healing scratches that are crusted over.   Patient admits to digging at her skin secondary to itching. See EMR for full details under skin  Genitalia/Breast:  Wilkerson catheter. Medication:  Scheduled Meds:   pantoprazole  40 mg IntraVENous BID    bumetanide  2 mg IntraVENous Q8H    polyethylene glycol  17 g Oral Daily    cholestyramine  1 packet Oral BID    folic acid  1 mg Oral Daily    colchicine  0.6 mg Oral Every Other Day    levothyroxine  100 mcg Oral Daily    ferrous sulfate  325 mg Oral Once per day on Mon Wed Fri    amLODIPine  20 mg Oral Daily    FLUoxetine  20 mg Oral Daily    hydrALAZINE  20 mg Oral BID    OLANZapine zydis  2.5 mg Oral Nightly    sodium bicarbonate  650 mg Oral BID    vitamin B-12  1,000 mcg Oral Daily    zinc gluconate  50 mg Oral Daily    [Held by provider] pantoprazole  40 mg Oral QAM AC    vitamin D  50,000 Units Oral Weekly     Continuous Infusions:   sodium chloride         Objective Data:  Recent Labs     03/12/23  0548 03/13/23  0515 03/14/23  0552   WBC 2.4* 4.2* 3.5*   RBC 2.21* 2.73* 2.64*   HGB 6.5* 8.1* 7.6*   HCT 20.6* 24.9* 24.6*   MCV 93.2 91.2 93.2   MCH 29.4 29.7 28.8   MCHC 31.6* 32.5 30.9*   RDW 14.5 14.4 14.2   PLT 53* 58* 56*   MPV 11.9 12.7* 13.3*       Recent Labs     03/12/23  0548 03/13/23  0515 03/14/23  0552    137 137   K 3.8 4.0 3.8    100 100   CO2 23 23 24   BUN 60* 62* 66*   CREATININE 6.1* 6.1* 6.5*   GLUCOSE 91 76 80   CALCIUM 7.5* 7.8* 8.0*   PROT 5.8* 5.7* 5.6*   LABALBU 3.3* 3.1* 3.2*   BILITOT 0.4 0.5 0.5   ALKPHOS 107* 116* 133*   AST 16 19 26   ALT 6 7 8         Wound Documentation:   See EMR-patient does have multiple scratches on her body from scratching. Assessment:  Multifactorial anasarca with progressive, end-stage renal failure and cirrhosis with decompensation  Acutely decompensated cirrhosis with ascites status post paracentesis performed 3/9/23 with removal of 7150 mL of ascitic fluid. Acute on End-stage renal disease with possible hepatorenal syndrome with consideration for initiation of RRT.   Acute on chronic normochromic normocytic anemia with iron deficiency status post blood transfusion and iron  Essential hypertension. Newly identified hypothyroidism with TSH of 11.870  Hyperuricemia  Depression. 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 Plan:   Glorine Poag he is improving overall. She has diuresed nearly 5 L and remains with around 1 L or greater of urinary output per day. She has been transition to high-dose intermittent Bumex by the nephrology team with plan for eventual transition to oral.  Monitoring renal status, electrolytes and uremia closely. Her volume status looks much better. Paracentesis evaluation will be undertaken today and performed if necessary with Bumex. GI is following as well in the setting of decompensated cirrhosis as well as acute on chronic anemia with guaiac positive stools. Hemoglobin is remained stable and endoscopy during this hospitalization is unlikely. Hematology was consulted as well due to the multifactorial anemia and the patient has received blood and iron infusions. Continue with fluid and sodium restricted diet. Continue with strict intake and output and daily weights. Close monitoring of volume status as well as the potential need for repeat paracentesis. Ultrasound will be repeated today to assess for need with repeat procedure if indicated. PT, OT and social work will follow for discharge planning purposes as the patient is deviated from her baseline functional status. Chronic comorbidities, labs and vital signs are being monitored and addressed accordingly. Glorine Poag requires this high level of physician care and nursing on the IMC/Telemetry unit due the complexity of decision management and chance of rapid decline or death. Continued cardiac monitoring and higher level of nursing are required. I am readily available for any further decision-making and intervention.      More than 50% of my  time was spent at the bedside counseling/coordinating care with the patient and/or family with face to face contact. This time was spent reviewing notes and laboratory data as well as instructing and counseling the patient. Time I spent with the family or surrogate(s) is included only if the patient was incapable of providing the necessary information or participating in medical decisions. I also discussed the differential diagnosis and all of the proposed management plans with the patient and individuals accompanying the patient.     QUINTEN Barrios - CNP,   3/14/2023  7:55 AM

## 2023-03-15 ENCOUNTER — APPOINTMENT (OUTPATIENT)
Dept: INTERVENTIONAL RADIOLOGY/VASCULAR | Age: 73
End: 2023-03-15
Attending: FAMILY MEDICINE
Payer: MEDICARE

## 2023-03-15 LAB
ALBUMIN SERPL-MCNC: 3 G/DL (ref 3.5–5.2)
ALP BLD-CCNC: 125 U/L (ref 35–104)
ALT SERPL-CCNC: 8 U/L (ref 0–32)
ANION GAP SERPL CALCULATED.3IONS-SCNC: 13 MMOL/L (ref 7–16)
AST SERPL-CCNC: 23 U/L (ref 0–31)
BASOPHILS ABSOLUTE: 0.02 E9/L (ref 0–0.2)
BASOPHILS RELATIVE PERCENT: 0.5 % (ref 0–2)
BILIRUB SERPL-MCNC: 0.5 MG/DL (ref 0–1.2)
BUN BLDV-MCNC: 68 MG/DL (ref 6–23)
CA-I BLD-SCNC: 1.12 MMOL/L (ref 1.15–1.33)
CALCIUM SERPL-MCNC: 8 MG/DL (ref 8.6–10.2)
CHLORIDE BLD-SCNC: 103 MMOL/L (ref 98–107)
CO2: 24 MMOL/L (ref 22–29)
CREAT SERPL-MCNC: 6.7 MG/DL (ref 0.5–1)
EOSINOPHILS ABSOLUTE: 0.14 E9/L (ref 0.05–0.5)
EOSINOPHILS RELATIVE PERCENT: 3.5 % (ref 0–6)
GFR SERPL CREATININE-BSD FRML MDRD: 6 ML/MIN/1.73
GLUCOSE BLD-MCNC: 80 MG/DL (ref 74–99)
GRAM STAIN ORDERABLE: NORMAL
HCT VFR BLD CALC: 24.8 % (ref 34–48)
HEMOGLOBIN: 7.6 G/DL (ref 11.5–15.5)
IMMATURE GRANULOCYTES #: 0.02 E9/L
IMMATURE GRANULOCYTES %: 0.5 % (ref 0–5)
INR BLD: 1.3
LYMPHOCYTES ABSOLUTE: 0.49 E9/L (ref 1.5–4)
LYMPHOCYTES RELATIVE PERCENT: 12.2 % (ref 20–42)
MAGNESIUM: 2.5 MG/DL (ref 1.6–2.6)
MCH RBC QN AUTO: 28.5 PG (ref 26–35)
MCHC RBC AUTO-ENTMCNC: 30.6 % (ref 32–34.5)
MCV RBC AUTO: 92.9 FL (ref 80–99.9)
MONOCYTES ABSOLUTE: 0.39 E9/L (ref 0.1–0.95)
MONOCYTES RELATIVE PERCENT: 9.7 % (ref 2–12)
NEUTROPHILS ABSOLUTE: 2.97 E9/L (ref 1.8–7.3)
NEUTROPHILS RELATIVE PERCENT: 73.6 % (ref 43–80)
OVALOCYTES: ABNORMAL
PDW BLD-RTO: 13.9 FL (ref 11.5–15)
PHOSPHORUS: 5.2 MG/DL (ref 2.5–4.5)
PLATELET # BLD: 71 E9/L (ref 130–450)
PLATELET CONFIRMATION: NORMAL
PMV BLD AUTO: 12.6 FL (ref 7–12)
POIKILOCYTES: ABNORMAL
POTASSIUM SERPL-SCNC: 4 MMOL/L (ref 3.5–5)
PROTHROMBIN TIME: 14.5 SEC (ref 9.3–12.4)
RBC # BLD: 2.67 E12/L (ref 3.5–5.5)
SODIUM BLD-SCNC: 140 MMOL/L (ref 132–146)
TOTAL PROTEIN: 5.2 G/DL (ref 6.4–8.3)
WBC # BLD: 4 E9/L (ref 4.5–11.5)

## 2023-03-15 PROCEDURE — 85025 COMPLETE CBC W/AUTO DIFF WBC: CPT

## 2023-03-15 PROCEDURE — 6370000000 HC RX 637 (ALT 250 FOR IP): Performed by: INTERNAL MEDICINE

## 2023-03-15 PROCEDURE — 02HV33Z INSERTION OF INFUSION DEVICE INTO SUPERIOR VENA CAVA, PERCUTANEOUS APPROACH: ICD-10-PCS | Performed by: INTERNAL MEDICINE

## 2023-03-15 PROCEDURE — 6360000002 HC RX W HCPCS: Performed by: NURSE PRACTITIONER

## 2023-03-15 PROCEDURE — 7100000010 HC PHASE II RECOVERY - FIRST 15 MIN

## 2023-03-15 PROCEDURE — C9113 INJ PANTOPRAZOLE SODIUM, VIA: HCPCS | Performed by: NURSE PRACTITIONER

## 2023-03-15 PROCEDURE — 76937 US GUIDE VASCULAR ACCESS: CPT

## 2023-03-15 PROCEDURE — 77001 FLUOROGUIDE FOR VEIN DEVICE: CPT

## 2023-03-15 PROCEDURE — 80053 COMPREHEN METABOLIC PANEL: CPT

## 2023-03-15 PROCEDURE — 1200000000 HC SEMI PRIVATE

## 2023-03-15 PROCEDURE — 6370000000 HC RX 637 (ALT 250 FOR IP): Performed by: NURSE PRACTITIONER

## 2023-03-15 PROCEDURE — 6370000000 HC RX 637 (ALT 250 FOR IP): Performed by: HOSPITALIST

## 2023-03-15 PROCEDURE — 84100 ASSAY OF PHOSPHORUS: CPT

## 2023-03-15 PROCEDURE — 83735 ASSAY OF MAGNESIUM: CPT

## 2023-03-15 PROCEDURE — 5A1D70Z PERFORMANCE OF URINARY FILTRATION, INTERMITTENT, LESS THAN 6 HOURS PER DAY: ICD-10-PCS | Performed by: INTERNAL MEDICINE

## 2023-03-15 PROCEDURE — 2500000003 HC RX 250 WO HCPCS: Performed by: INTERNAL MEDICINE

## 2023-03-15 PROCEDURE — 7100000011 HC PHASE II RECOVERY - ADDTL 15 MIN

## 2023-03-15 PROCEDURE — 82330 ASSAY OF CALCIUM: CPT

## 2023-03-15 PROCEDURE — 36415 COLL VENOUS BLD VENIPUNCTURE: CPT

## 2023-03-15 PROCEDURE — 36558 INSERT TUNNELED CV CATH: CPT

## 2023-03-15 PROCEDURE — 80074 ACUTE HEPATITIS PANEL: CPT

## 2023-03-15 PROCEDURE — 85610 PROTHROMBIN TIME: CPT

## 2023-03-15 PROCEDURE — C1894 INTRO/SHEATH, NON-LASER: HCPCS

## 2023-03-15 PROCEDURE — 6360000002 HC RX W HCPCS: Performed by: RADIOLOGY

## 2023-03-15 PROCEDURE — 90935 HEMODIALYSIS ONE EVALUATION: CPT

## 2023-03-15 RX ORDER — MIDAZOLAM HYDROCHLORIDE 1 MG/ML
INJECTION INTRAMUSCULAR; INTRAVENOUS
Status: COMPLETED | OUTPATIENT
Start: 2023-03-15 | End: 2023-03-15

## 2023-03-15 RX ORDER — FENTANYL CITRATE 0.05 MG/ML
INJECTION, SOLUTION INTRAMUSCULAR; INTRAVENOUS
Status: COMPLETED | OUTPATIENT
Start: 2023-03-15 | End: 2023-03-15

## 2023-03-15 RX ADMIN — MIDAZOLAM 1 MG: 1 INJECTION INTRAMUSCULAR; INTRAVENOUS at 10:46

## 2023-03-15 RX ADMIN — OLANZAPINE 2.5 MG: 5 TABLET, ORALLY DISINTEGRATING ORAL at 20:11

## 2023-03-15 RX ADMIN — FERROUS SULFATE TAB 325 MG (65 MG ELEMENTAL FE) 325 MG: 325 (65 FE) TAB at 12:02

## 2023-03-15 RX ADMIN — BUMETANIDE 2 MG: 0.25 INJECTION INTRAMUSCULAR; INTRAVENOUS at 17:48

## 2023-03-15 RX ADMIN — SODIUM BICARBONATE 650 MG: 650 TABLET ORAL at 20:12

## 2023-03-15 RX ADMIN — PANTOPRAZOLE SODIUM 40 MG: 40 INJECTION, POWDER, FOR SOLUTION INTRAVENOUS at 08:35

## 2023-03-15 RX ADMIN — CHOLESTYRAMINE 4 G: 4 POWDER, FOR SUSPENSION ORAL at 20:12

## 2023-03-15 RX ADMIN — FENTANYL CITRATE 25 MCG: 50 INJECTION INTRAMUSCULAR; INTRAVENOUS at 10:46

## 2023-03-15 RX ADMIN — AMLODIPINE BESYLATE 20 MG: 10 TABLET ORAL at 12:07

## 2023-03-15 RX ADMIN — EPOETIN ALFA-EPBX 10000 UNITS: 10000 INJECTION, SOLUTION INTRAVENOUS; SUBCUTANEOUS at 18:41

## 2023-03-15 RX ADMIN — FLUOXETINE 20 MG: 20 CAPSULE ORAL at 12:02

## 2023-03-15 RX ADMIN — POLYETHYLENE GLYCOL 3350 17 G: 17 POWDER, FOR SOLUTION ORAL at 12:10

## 2023-03-15 RX ADMIN — LEVOTHYROXINE SODIUM 100 MCG: 100 TABLET ORAL at 06:01

## 2023-03-15 RX ADMIN — PANTOPRAZOLE SODIUM 40 MG: 40 INJECTION, POWDER, FOR SOLUTION INTRAVENOUS at 20:11

## 2023-03-15 RX ADMIN — CYANOCOBALAMIN TAB 1000 MCG 1000 MCG: 1000 TAB at 12:02

## 2023-03-15 RX ADMIN — FOLIC ACID 1 MG: 1 TABLET ORAL at 12:02

## 2023-03-15 RX ADMIN — ERGOCALCIFEROL 50000 UNITS: 1.25 CAPSULE ORAL at 12:02

## 2023-03-15 RX ADMIN — Medication 50 MG: at 12:03

## 2023-03-15 RX ADMIN — COLCHICINE 0.6 MG: 0.6 TABLET, FILM COATED ORAL at 12:10

## 2023-03-15 NOTE — PROGRESS NOTES
73 yo female with multifactorial anemia in setting of CKD stage IV (IgA nephropathy), iron deficiency, B12 deficiency, +Intrinsic factor Ab, pernicious anemia. Also hx of liver cirrhosis. She was treated with IV iron and recently started on VIKKI on 1/16/23 and given 1 dose, missed 2nd dose on 2/23/23. Admitted with volume overload and acute on CKD. S/p paracentesis of 7150 cc fluid. Counts stable, follow-up as scheduled. Thank you for allowing us to participate in the care of Calvin Prince. Josee Tucker PA-C  [phone]    Electronically signed by ROBERT Whiting on 3/15/2023 at 3:19 PM    Note: This report was completed using [employer] voiced recognition software. Every effort has been made to ensure accuracy; however, inadvertent computerized transcription errors may be present.

## 2023-03-15 NOTE — PROGRESS NOTES
Comprehensive Nutrition Assessment    Type and Reason for Visit:  Initial, Positive Nutrition Screen    Nutrition Recommendations/Plan:   Continue current diet order   Recommend ONS Magic Cup daily to optimize nutrition        Malnutrition Assessment:  Malnutrition Status: At risk for malnutrition (Comment) (03/15/23 9583)    Context:  Chronic Illness     Findings of the 6 clinical characteristics of malnutrition:  Energy Intake:  Mild decrease in energy intake (Comment)  Weight Loss:  No significant weight loss     Body Fat Loss:  No significant body fat loss     Muscle Mass Loss:  No significant muscle mass loss    Fluid Accumulation:  Unable to assess (ascites)     Strength:  Not Performed    Nutrition Assessment:    Pt admit w/ acute renal failure, noted anasarca, cirrhosis w/ ascites s/p paracentesis, anemia. IR placed dialysis catheter, HD initiated, pending placement. PMHx of HTN, anemia, CKD. Pt appetite is fair, intake variable %, will add ONS and continue inpatient monitoring    Nutrition Related Findings:    abd soft, nontender, istended, hypoactive BS, A&Ox4, 3450ml ascitic fluid removed, +2 nonpitting edema, I/O's -6.3L since admit Wound Type: None       Current Nutrition Intake & Therapies:    Average Meal Intake: 51-75% (average)  Average Supplements Intake: None Ordered  ADULT DIET; Regular; Low Sodium (2 gm)  ADULT ORAL NUTRITION SUPPLEMENT; Dinner; Frozen Oral Supplement    Anthropometric Measures:  Height: 5' 2\" (157.5 cm)  Ideal Body Weight (IBW): 110 lbs (50 kg)    Admission Body Weight: 190 lb 6 oz (86.4 kg) (3/9 actual)  Current Body Weight: 160 lb 11.5 oz (72.9 kg), 146.1 % IBW. Weight Source: Bed Scale  Current BMI (kg/m2): 29.4  Usual Body Weight:  (KAMRON d/t lack of measured wt hx per EMR)  Weight Adjustment For: No Adjustment  BMI Categories: Overweight (BMI 25.0-29. 9)    Estimated Daily Nutrient Needs:  Energy Requirements Based On: Formula  Weight Used for Energy Requirements: Current  Energy (kcal/day): 5600-8628  Weight Used for Protein Requirements: Ideal  Protein (g/day): 75-85g/day (1.5-1.7g/kg IBW)  Method Used for Fluid Requirements: Standard Renal  Fluid (ml/day): per nephrology / MD    Nutrition Diagnosis:   Increased nutrient needs related to increase demand for energy/nutrients as evidenced by dialysis    Nutrition Interventions:   Food and/or Nutrient Delivery: Continue Current Diet, Start Oral Nutrition Supplement (Recommend ONS Magic Cup daily to optimize nutrition)  Nutrition Education/Counseling: No recommendation at this time  Coordination of Nutrition Care: Continue to monitor while inpatient    Goals:  Goals: PO intake 75% or greater, by next RD assessment    Nutrition Monitoring and Evaluation:   Behavioral-Environmental Outcomes: None Identified  Food/Nutrient Intake Outcomes: Supplement Intake, Food and Nutrient Intake  Physical Signs/Symptoms Outcomes: Biochemical Data, Chewing or Swallowing, Fluid Status or Edema, GI Status, Hemodynamic Status, Nutrition Focused Physical Findings, Weight    Discharge Planning:     Too soon to determine     Kelton Guy RD  Contact:

## 2023-03-15 NOTE — CARE COORDINATION
Ss note: 3/15/2023. 10:48 AM Will need Rapid Covid for SNF placement. Pt has been accepted at [facility] for skilled rehab, [place] [address]. Per IDR pt for Tunnel Catheter placement today. Consult noted to arrange outpt HD. Met with son Qing Killian, discussed clinics and son prefers [facility] in [address] [place] [address] Avenue  [phone]). SW began initial referral to kimberley Salgado with [facility] but will need to fax referral information when obtained (hep panel result, tunnel cath placement and flowsheets) to obtain an outpt chair time. Son relays family will transport pt to HD treatments while in rehab. Kimberley Buitrago with [facility] updated. Need Hens and signed ELDA. SW will follow.  NAVJOT Peter

## 2023-03-15 NOTE — PROGRESS NOTES
PROGRESS NOTE  By Wil Hammond M.D. [facility]  Dr. Balwinder Marie M.D.,  Dr. Luana Denton M.D.,   Dr. Jennifer Tadeo D.O.,  Dr. Anusha Deleon M.D.,  Dr. Fatou Alexandra D.O.,          Kristi Form  74 y.o.  female    SUBJECTIVE:  Denies abdominal pain. Denies nausea vomiting. Son at bedside    OBJECTIVE:    BP (!) 112/55   Pulse 79   Temp 98.5 °F (36.9 °C) (Oral)   Resp 18   Ht 5' 2\" (1.575 m)   Wt 184 lb (83.5 kg)   SpO2 95%   BMI 33.65 kg/m²     General: NAD/elderly  female  HEENT: Anicteric sclera/moist oral mucosa  Neck: Supple with trachea midline  Chest: Symmetric excursion/nonlabored respirations  Cor: Regular  Abd.: Soft and nontender  Extr.:  BLE 2-3+ edema  Skin: Warm and dry      DATA:       Lab Results   Component Value Date/Time    WBC 4.0 03/15/2023 05:05 AM    RBC 2.67 03/15/2023 05:05 AM    HGB 7.6 03/15/2023 05:05 AM    HCT 24.8 03/15/2023 05:05 AM    MCV 92.9 03/15/2023 05:05 AM    MCH 28.5 03/15/2023 05:05 AM    MCHC 30.6 03/15/2023 05:05 AM    RDW 13.9 03/15/2023 05:05 AM    PLT 71 03/15/2023 05:05 AM    MPV 12.6 03/15/2023 05:05 AM     Lab Results   Component Value Date/Time     03/15/2023 05:05 AM    K 4.0 03/15/2023 05:05 AM    K 3.9 02/10/2023 07:40 AM     03/15/2023 05:05 AM    CO2 24 03/15/2023 05:05 AM    BUN 68 03/15/2023 05:05 AM    CREATININE 6.7 03/15/2023 05:05 AM    CALCIUM 8.0 03/15/2023 05:05 AM    PROT 5.2 03/15/2023 05:05 AM    LABALBU 3.0 03/15/2023 05:05 AM    BILITOT 0.5 03/15/2023 05:05 AM    ALKPHOS 125 03/15/2023 05:05 AM    AST 23 03/15/2023 05:05 AM    ALT 8 03/15/2023 05:05 AM     No results found for: LIPASE  No results found for: AMYLASE      ASSESSMENT/PLAN:  Patient Active Problem List   Diagnosis    End stage renal disease ([facility] [address]    Encounter regarding vascular access for dialysis for end-stage renal disease ([facility] [address]    Acute renal failure (ARF) ([facility] [address]     1.   Cirrhosis  -Decompensated/nonalcoholic  -MELD cannot be calculated as no INR has been obtained  -Monitor MELD labs  -Ultrasound/Doppler ultrasound reveals patent portal/hepatic veins  -Nonelevated AFP     2. Anemia  -Acute on chronic  -Iron deficient  -No evidence of overt bleed but decreased H&H/positive FOBT  -EGD 3/14 revealing no bleeding source and no significant varices  -Consider nuclear medicine bleeding scan if further decrease in H&H    3. Ascites  -S/P paracentesis -unknown amount removed as noted is pending  -Ascitic fluid analysis not consistent with SBP  -Defer diuretics to admitting/nephrology  -Plan to repeat paracentesis today      4. Renal disease  -CKD stage IV-V  -Per admitting/pertinent consultants     Above has been discussed with patient and her son at bedside and all questions answered to their satisfaction. They verbalized understanding and agreement with the plan as delineated      Quinton St MD  3/15/2023  1:10 PM    NOTE:  This report was transcribed using voice recognition software. Every effort was made to ensure accuracy; however, inadvertent computerized transcription errors may be present.

## 2023-03-15 NOTE — PROGRESS NOTES
Patient came down to special procedures for tunneled dialysis catheter placement. Procedure was explained, questions were answered. Patient was educated about the amount of radiation used with today's procedure. Patient prepped secured and draped. 1046 sedation medication given     1049 Procedure start /45  77  10  100% on 2LPM by nasal cannula  supine      1100 Procedure end /47  76  11  100% on 2LPM by nasal cannula  supine     Tunneled dialysis catheter to right IJ/chest.  Catheter sutured in place. Folded 4 x 4 and tegaderm applied to both right IJ and right chest.  CDI    Patient tolerated procedure    1115 15 minutes post procedure /36  75  12  96% on room air  no complaints of pain, CDI. 1130 30 minutes post procedure /32  72  10  96 %on room air  no complaints of pain, CDI. Total amount of sedation medication given during procedure: 1 mg of IV Versed and 25mcg of IV Fentanyl     nurse to nurse called, spoke with Ashok Cole, nurse notified of above information. Patient transported back to the 4th  floor.

## 2023-03-15 NOTE — PROGRESS NOTES
Internal Medicine Progress Note    [facility]    Luana Garcia. Naseem Hampton., CHRIS.IVAN.JAZMYNONydiaI. Hussain Malik D.O., CLIVE Paiz D.O. Nai Williamson D.O. Winston Waite, MSN, APRN, NP-C  Arnulfo Galarza. Tavo Dahl, MSN, APRN-CNP     Primary Care Physician: Lorena Bro MD   Admitting Physician:  Mao Rosado DO  Admission date and time: 3/8/2023  9:59 AM    Room:  [facility]2533-27  Admitting diagnosis: Acute renal failure (ARF) [facility]) [N17.9]    Patient Name: Carmela Medina  MRN: [id number]    Date of Service: 3/15/2023     Subjective:  Evelina Esparza is a 74 y.o. female who was seen and examined today,3/15/2023, at the bedside. Underwent paracentesis  yesterday-  3450 ml of ascitic fluid was removed. Patient states she does feel less distended today. Patient is for tunneled dialysis catheter placement later today. William Mcdonough, her son, was present during my examination. Review of System:   Constitutional:   Denies fever or chills, weight loss or gain, states fatigue is improving. HEENT:   Denies ear pain, sore throat, sinus or eye problems. Cardiovascular:   Denies any chest pain, irregular heartbeats, or palpitations. Respiratory:   Denies shortness of breath, coughing, sputum production, hemoptysis, or wheezing. Gastrointestinal:   Denies nausea, vomiting, diarrhea, or constipation. Underwent paracentesis on 3/14. States she does have less abdominal distention. Genitourinary:    Denies any urgency, frequency, hematuria. Voiding  without difficulty with Wilkerson catheter. Extremities:   Positive for lower trace edema/swelling of the bilateral lower extremities. Neurology:    Denies any headache or focal neurological deficits, generalized weakness and deconditioning without focal complaint  Psch:   Denies being anxious or depressed. Musculoskeletal:    Denies  myalgias, joint complaints or back pain.    Integumentary:   Denies any rashes, ulcers, or excoriations. Denies bruising. Hematologic/Lymphatic:  Denies bruising or bleeding. Physical Exam:  No intake/output data recorded. Intake/Output Summary (Last 24 hours) at 3/15/2023 1535  Last data filed at 3/15/2023 0455  Gross per 24 hour   Intake 110 ml   Output 750 ml   Net -640 ml   I/O last 3 completed shifts: In: 170 [P.O.:120; I.V.:50]  Out: 1300 [Urine:1300]  Patient Vitals for the past 96 hrs (Last 3 readings):   Weight   03/15/23 1330 161 lb 13.1 oz (73.4 kg)     Vital Signs:   Blood pressure (!) 103/53, pulse 68, temperature 98.2 °F (36.8 °C), resp. rate 16, height 5' 2\" (1.575 m), weight 161 lb 13.1 oz (73.4 kg), SpO2 95 %. General appearance:  Alert, responsive, oriented to person, place, and time. Acute on chronic ill appearance, no distress. Head:  Normocephalic. No masses, lesions or tenderness. Eyes:  PERRLA. EOMI. Sclera clear. Buccal mucosa moist.  Impaired vision. ENT:  Ears normal. Mucosa normal.  Neck:    Supple. Trachea midline. No thyromegaly. No JVD. No bruits. Heart:    Rhythm regular. Rate controlled. S1 and S2. Systolic murmur. Lungs:    Symmetrical.  Diminished bibasilar air exchange. Otherwise lungs are clear to auscultation bilaterally. No wheezes. No rhonchi. No rales. Abdomen:   Softly distended. Bowel sounds positive. No organomegaly or masses. Mild to moderate ascites. .  Extremities:    Peripheral pulses present. 1+ pitting edema the bilateral lower extremities. Edema/swelling of the upper extremities. AV fistula left upper extremity. Neurologic:    Alert x 3. Generally weak without focal deficit. Cranial nerves grossly intact. No focal weakness. Psych:   Behavior is normal. Mood appears normal. Speech is not rapid and/or pressured. Musculoskeletal:   No unilateral joint edema, erythema or warmth. .  Gait not assessed. Integumentary:  Patient has new open areas as well as old healing scratches that are crusted over. Patient admits to digging at her skin secondary to itching. See EMR for full details under skin  Genitalia/Breast:  Wilkerson catheter. Medication:  Scheduled Meds:   epoetin gayle-epbx  10,000 Units SubCUTAneous Once per day on Mon Wed Fri    pantoprazole  40 mg IntraVENous BID    bumetanide  2 mg IntraVENous Q8H    polyethylene glycol  17 g Oral Daily    cholestyramine  1 packet Oral BID    folic acid  1 mg Oral Daily    colchicine  0.6 mg Oral Every Other Day    levothyroxine  100 mcg Oral Daily    ferrous sulfate  325 mg Oral Once per day on Mon Wed Fri    amLODIPine  20 mg Oral Daily    FLUoxetine  20 mg Oral Daily    hydrALAZINE  20 mg Oral BID    OLANZapine zydis  2.5 mg Oral Nightly    sodium bicarbonate  650 mg Oral BID    vitamin B-12  1,000 mcg Oral Daily    zinc gluconate  50 mg Oral Daily    [Held by provider] pantoprazole  40 mg Oral QAM AC    vitamin D  50,000 Units Oral Weekly     Continuous Infusions:   sodium chloride         Objective Data:  Recent Labs     03/13/23 0515 03/14/23  0552 03/15/23  0505   WBC 4.2* 3.5* 4.0*   RBC 2.73* 2.64* 2.67*   HGB 8.1* 7.6* 7.6*   HCT 24.9* 24.6* 24.8*   MCV 91.2 93.2 92.9   MCH 29.7 28.8 28.5   MCHC 32.5 30.9* 30.6*   RDW 14.4 14.2 13.9   PLT 58* 56* 71*   MPV 12.7* 13.3* 12.6*     Recent Labs     03/13/23 0515 03/14/23  0552 03/15/23  0505    137 140   K 4.0 3.8 4.0    100 103   CO2 23 24 24   BUN 62* 66* 68*   CREATININE 6.1* 6.5* 6.7*   GLUCOSE 76 80 80   CALCIUM 7.8* 8.0* 8.0*   PROT 5.7* 5.6* 5.2*   LABALBU 3.1* 3.2* 3.0*   BILITOT 0.5 0.5 0.5   ALKPHOS 116* 133* 125*   AST 19 26 23   ALT 7 8 8       Wound Documentation:   See EMR-patient does have multiple scratches on her body from scratching.     Assessment:  Multifactorial anasarca with progressive, end-stage renal failure and cirrhosis with decompensation  Acutely decompensated cirrhosis with ascites status post paracentesis performed 3/9/23 with removal of 7150 mL of ascitic fluid and repeat paracentesis performed on 3/14 with removal of approximately 3450 mL. Acute on End-stage renal disease with possible hepatorenal syndrome with consideration for initiation of RRT. Acute on chronic normochromic normocytic anemia with iron deficiency status post blood transfusion and iron  Essential hypertension. Newly identified hypothyroidism with TSH of 11.870  Hyperuricemia  Depression. 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 Plan:   Patient is doing well status post paracentesis performed yesterday. She states she has less distention of the abdomen. Patient is for tunneled dialysis catheter placement later today.  is assisting with discharge planning. They are in the process of making arrangement for the patient to undergo dialysis at [facility]. Plan is for skilled nursing facility placement. GI is following patient recommendations of been reviewed. Continue diuretic therapy. Daily weights and strict intake and output. Nephrologist for the patient. Dialysis is at their discretion. Protonix for GI prophylaxis. Anticoagulation on hold secondary to anemia. PT, OT to evaluate and treat. Chronic comorbidities, labs and vital signs are being monitored and addressed accordingly. Kal Donato requires this high level of physician care and nursing on the IMC/Telemetry unit due the complexity of decision management and chance of rapid decline or death. Continued cardiac monitoring and higher level of nursing are required. I am readily available for any further decision-making and intervention. More than 50% of my  time was spent at the bedside counseling/coordinating care with the patient and/or family with face to face contact. This time was spent reviewing notes and laboratory data as well as instructing and counseling the patient.  Time I spent with the family or surrogate(s) is included only if the patient was incapable of providing the necessary information or participating in medical decisions. I also discussed the differential diagnosis and all of the proposed management plans with the patient and individuals accompanying the patient. The patient was seen, examined and then discussed with Dr. Camryn Huff. QUINTEN Santos - CARLITO,   3/15/2023  3:35 PM       I saw and evaluated the patient. I agree with the findings and the plan of care as documented in Isaiah Landry NP-C's  note.     Brittany Tripp DO, D.O., [facility]  4:40 PM  3/15/2023

## 2023-03-15 NOTE — PROGRESS NOTES
Physical Therapy    Room #:   1378/4004-71    Date: 3/15/2023       Patient Name: Sharath Hidalgo  : 1950      MRN: [id number]     Patient unavailable for physical therapy treatment due to off floor at [facility]. Will attempt PT treatment at a later time. Thank you.         Cornelia Carolina, PT

## 2023-03-15 NOTE — PROGRESS NOTES
Physical Therapy    Room #:   2645/4001-04    Date: 3/15/2023       Patient Name: Migue Arce  : 1950      MRN: [id number]     Patient unavailable for physical therapy treatment due to off floor at dialysis. Will attempt PT treatment at a later time. Thank you.         Kassy Damico, PT

## 2023-03-16 LAB
ALBUMIN SERPL-MCNC: 2.9 G/DL (ref 3.5–5.2)
ALP SERPL-CCNC: 143 U/L (ref 35–104)
ALT SERPL-CCNC: 10 U/L (ref 0–32)
ANION GAP SERPL CALCULATED.3IONS-SCNC: 11 MMOL/L (ref 7–16)
AST SERPL-CCNC: 27 U/L (ref 0–31)
BASOPHILS # BLD: 0.02 E9/L (ref 0–0.2)
BASOPHILS NFR BLD: 0.6 % (ref 0–2)
BILIRUB SERPL-MCNC: 0.5 MG/DL (ref 0–1.2)
BUN SERPL-MCNC: 47 MG/DL (ref 6–23)
CALCIUM SERPL-MCNC: 7.7 MG/DL (ref 8.6–10.2)
CHLORIDE SERPL-SCNC: 102 MMOL/L (ref 98–107)
CO2 SERPL-SCNC: 26 MMOL/L (ref 22–29)
CREAT SERPL-MCNC: 5 MG/DL (ref 0.5–1)
EOSINOPHIL # BLD: 0.09 E9/L (ref 0.05–0.5)
EOSINOPHIL NFR BLD: 2.6 % (ref 0–6)
ERYTHROCYTE [DISTWIDTH] IN BLOOD BY AUTOMATED COUNT: 13.9 FL (ref 11.5–15)
GLUCOSE SERPL-MCNC: 85 MG/DL (ref 74–99)
HCT VFR BLD AUTO: 24.1 % (ref 34–48)
HGB BLD-MCNC: 7.9 G/DL (ref 11.5–15.5)
HYPOCHROMIA: ABNORMAL
IMM GRANULOCYTES # BLD: 0.06 E9/L
IMM GRANULOCYTES NFR BLD: 1.7 % (ref 0–5)
LYMPHOCYTES # BLD: 0.39 E9/L (ref 1.5–4)
LYMPHOCYTES NFR BLD: 11.4 % (ref 20–42)
MAGNESIUM SERPL-MCNC: 2.3 MG/DL (ref 1.6–2.6)
MCH RBC QN AUTO: 29.9 PG (ref 26–35)
MCHC RBC AUTO-ENTMCNC: 32.8 % (ref 32–34.5)
MCV RBC AUTO: 91.3 FL (ref 80–99.9)
MONOCYTES # BLD: 0.4 E9/L (ref 0.1–0.95)
MONOCYTES NFR BLD: 11.7 % (ref 2–12)
NEUTROPHILS # BLD: 2.47 E9/L (ref 1.8–7.3)
NEUTS SEG NFR BLD: 72 % (ref 43–80)
OVALOCYTES: ABNORMAL
PHOSPHATE SERPL-MCNC: 3.9 MG/DL (ref 2.5–4.5)
PLATELET # BLD AUTO: 68 E9/L (ref 130–450)
PLATELET CONFIRMATION: NORMAL
PMV BLD AUTO: 12.4 FL (ref 7–12)
POIKILOCYTES: ABNORMAL
POLYCHROMASIA: ABNORMAL
POTASSIUM SERPL-SCNC: 3.9 MMOL/L (ref 3.5–5)
PROT SERPL-MCNC: 5.3 G/DL (ref 6.4–8.3)
RBC # BLD AUTO: 2.64 E12/L (ref 3.5–5.5)
SODIUM SERPL-SCNC: 139 MMOL/L (ref 132–146)
WBC # BLD: 3.4 E9/L (ref 4.5–11.5)

## 2023-03-16 PROCEDURE — 6360000002 HC RX W HCPCS: Performed by: NURSE PRACTITIONER

## 2023-03-16 PROCEDURE — 2500000003 HC RX 250 WO HCPCS: Performed by: INTERNAL MEDICINE

## 2023-03-16 PROCEDURE — 84100 ASSAY OF PHOSPHORUS: CPT

## 2023-03-16 PROCEDURE — 97530 THERAPEUTIC ACTIVITIES: CPT

## 2023-03-16 PROCEDURE — 80053 COMPREHEN METABOLIC PANEL: CPT

## 2023-03-16 PROCEDURE — 1200000000 HC SEMI PRIVATE

## 2023-03-16 PROCEDURE — 6370000000 HC RX 637 (ALT 250 FOR IP): Performed by: NURSE PRACTITIONER

## 2023-03-16 PROCEDURE — C9113 INJ PANTOPRAZOLE SODIUM, VIA: HCPCS | Performed by: NURSE PRACTITIONER

## 2023-03-16 PROCEDURE — 36415 COLL VENOUS BLD VENIPUNCTURE: CPT

## 2023-03-16 PROCEDURE — 85025 COMPLETE CBC W/AUTO DIFF WBC: CPT

## 2023-03-16 PROCEDURE — 6370000000 HC RX 637 (ALT 250 FOR IP): Performed by: INTERNAL MEDICINE

## 2023-03-16 PROCEDURE — 97110 THERAPEUTIC EXERCISES: CPT

## 2023-03-16 PROCEDURE — 97116 GAIT TRAINING THERAPY: CPT

## 2023-03-16 PROCEDURE — 83735 ASSAY OF MAGNESIUM: CPT

## 2023-03-16 PROCEDURE — 90935 HEMODIALYSIS ONE EVALUATION: CPT

## 2023-03-16 RX ORDER — AMLODIPINE BESYLATE 5 MG/1
5 TABLET ORAL DAILY
Status: DISCONTINUED | OUTPATIENT
Start: 2023-03-17 | End: 2023-03-17 | Stop reason: HOSPADM

## 2023-03-16 RX ADMIN — Medication 50 MG: at 10:05

## 2023-03-16 RX ADMIN — FOLIC ACID 1 MG: 1 TABLET ORAL at 10:05

## 2023-03-16 RX ADMIN — LEVOTHYROXINE SODIUM 100 MCG: 100 TABLET ORAL at 05:15

## 2023-03-16 RX ADMIN — FLUOXETINE 20 MG: 20 CAPSULE ORAL at 10:04

## 2023-03-16 RX ADMIN — PANTOPRAZOLE SODIUM 40 MG: 40 INJECTION, POWDER, FOR SOLUTION INTRAVENOUS at 10:13

## 2023-03-16 RX ADMIN — CYANOCOBALAMIN TAB 1000 MCG 1000 MCG: 1000 TAB at 10:05

## 2023-03-16 RX ADMIN — PANTOPRAZOLE SODIUM 40 MG: 40 INJECTION, POWDER, FOR SOLUTION INTRAVENOUS at 20:20

## 2023-03-16 RX ADMIN — CHOLESTYRAMINE 4 G: 4 POWDER, FOR SUSPENSION ORAL at 20:19

## 2023-03-16 RX ADMIN — SODIUM BICARBONATE 650 MG: 650 TABLET ORAL at 20:20

## 2023-03-16 RX ADMIN — SODIUM BICARBONATE 650 MG: 650 TABLET ORAL at 10:04

## 2023-03-16 RX ADMIN — CHOLESTYRAMINE 4 G: 4 POWDER, FOR SUSPENSION ORAL at 10:06

## 2023-03-16 RX ADMIN — BUMETANIDE 2 MG: 0.25 INJECTION INTRAMUSCULAR; INTRAVENOUS at 00:04

## 2023-03-16 RX ADMIN — OLANZAPINE 2.5 MG: 5 TABLET, ORALLY DISINTEGRATING ORAL at 20:20

## 2023-03-16 NOTE — CARE COORDINATION
Ss note: 3/16/15088:20 AM Will need Rapid Covid for SNF placement. [facility] Pt received tunneled catheter yesterday and one HD treatment. Per IDR is for 2nd HD treatment today. Family prefers [facility] at [address] clinic [phone]. SW made initial referral to [facility] yesterday, today sw faxed available information to include cath placement, first flowsheet and nephrology notes to central intake, awaiting HEP PANEL result which will need faxed along with pts next two flowsheets [phone] SW requested an outpt chair time from [facility] as pt is possible discharge to SNF tomorrow. Need Hens and signed ELDA. Son relays family will transport to outpt HD treatments. Awaiting outpt HD chair time.  Berenice Gottlieb, NAVJOT

## 2023-03-16 NOTE — PROGRESS NOTES
Internal Medicine Progress Note    [facility]    Russ Powell. Christine Price., F.A.C.ONydiaI. Marcus Andrews D.O., ISHMAELOSAMM Hahn D.O. Meron Mathias D.O. Corey Alves, MSN, APRN, NP-C  Shae Sparks. Mandi Clay, MSN, APRN-CNP     Primary Care Physician: Kimberly Sol MD   Admitting Physician:  Diane Barry DO  Admission date and time: 3/8/2023  9:59 AM    Room:  [facility]  Admitting diagnosis: Acute renal failure (ARF) [facility]) [N17.9]    Patient Name: Venkatesh Mcintosh  MRN: [id number]    Date of Service: 3/16/2023     Subjective:  Ximena Jennings is a 74 y.o. female who was seen and examined today,3/16/2023, at the bedside. Underwent dialysis yesterday and tolerated well and is for another treatment today. States she is feeling better on a daily basis. Chirag Haynes, her son, was present during my examination. Review of System:   Constitutional:   Denies fever or chills, weight loss or gain, states fatigue is improving. HEENT:   Denies ear pain, sore throat, sinus or eye problems. Cardiovascular:   Denies any chest pain, irregular heartbeats, or palpitations. Respiratory:   Denies shortness of breath, coughing, sputum production, hemoptysis, or wheezing. Gastrointestinal:   Denies nausea, vomiting, diarrhea, or constipation. States she does have less abdominal distention since paracentesis. Genitourinary:    Denies any urgency, frequency, hematuria. Voiding  without difficulty with Wilkerson catheter. Extremities:   Positive for lower trace edema/swelling of the bilateral lower extremities. Neurology:    Denies any headache or focal neurological deficits, generalized weakness and deconditioning without focal complaint  Psch:   Denies being anxious or depressed. Musculoskeletal:    Denies  myalgias, joint complaints or back pain. Integumentary:   Denies any rashes, ulcers, or excoriations.   Denies bruising. Hematologic/Lymphatic:  Denies bruising or bleeding. Physical Exam:  No intake/output data recorded. Intake/Output Summary (Last 24 hours) at 3/16/2023 1603  Last data filed at 3/15/2023 1748  Gross per 24 hour   Intake --   Output 750 ml   Net -750 ml   I/O last 3 completed shifts: In: 360 [P.O.:60]  Out: 2050 [Urine:1250]  Patient Vitals for the past 96 hrs (Last 3 readings):   Weight   03/15/23 1537 160 lb 11.5 oz (72.9 kg)   03/15/23 1330 161 lb 13.1 oz (73.4 kg)     Vital Signs:   Blood pressure (!) 111/52, pulse 75, temperature 98 °F (36.7 °C), temperature source Oral, resp. rate 16, height 5' 2\" (1.575 m), weight 160 lb 11.5 oz (72.9 kg), SpO2 93 %. General appearance:  Alert, responsive, oriented to person, place, and time. Acute on chronic ill appearance, no distress. Head:  Normocephalic. No masses, lesions or tenderness. Eyes:  PERRLA. EOMI. Sclera clear. Buccal mucosa moist.  Impaired vision. ENT:  Ears normal. Mucosa normal.  Neck:    Supple. Trachea midline. No thyromegaly. No JVD. No bruits. Tunneled dialysis catheter  Heart:    Rhythm regular. Rate controlled. S1 and S2. Systolic murmur. Lungs:    Symmetrical.  Diminished bibasilar air exchange. Otherwise lungs are clear to auscultation bilaterally. No wheezes. No rhonchi. No rales. Abdomen:   Softly distended. Bowel sounds positive. No organomegaly or masses. Mild to moderate ascites. .  Extremities:    Peripheral pulses present. 1+ pitting edema the bilateral lower extremities. Edema/swelling of the upper extremities. AV fistula left upper extremity. Neurologic:    Alert x 3. Generally weak without focal deficit. Cranial nerves grossly intact. No focal weakness. Psych:   Behavior is normal. Mood appears normal. Speech is not rapid and/or pressured. Musculoskeletal:   No unilateral joint edema, erythema or warmth. .  Gait not assessed. Integumentary:  Patient's skin is healing.   Crusted areas are resolving. No new areas noted. See EMR for full details under skin  Genitalia/Breast:  Wilkerson catheter. Medication:  Scheduled Meds:   [START ON 3/17/2023] amLODIPine  5 mg Oral Daily    epoetin gayle-epbx  10,000 Units SubCUTAneous Once per day on Mon Wed Fri    pantoprazole  40 mg IntraVENous BID    bumetanide  2 mg IntraVENous Q8H    polyethylene glycol  17 g Oral Daily    cholestyramine  1 packet Oral BID    folic acid  1 mg Oral Daily    colchicine  0.6 mg Oral Every Other Day    levothyroxine  100 mcg Oral Daily    ferrous sulfate  325 mg Oral Once per day on Mon Wed Fri    FLUoxetine  20 mg Oral Daily    OLANZapine zydis  2.5 mg Oral Nightly    sodium bicarbonate  650 mg Oral BID    vitamin B-12  1,000 mcg Oral Daily    zinc gluconate  50 mg Oral Daily    [Held by provider] pantoprazole  40 mg Oral QAM AC    vitamin D  50,000 Units Oral Weekly     Continuous Infusions:   sodium chloride         Objective Data:  Recent Labs     03/14/23  0552 03/15/23  0505 03/16/23  0805   WBC 3.5* 4.0* 3.4*   RBC 2.64* 2.67* 2.64*   HGB 7.6* 7.6* 7.9*   HCT 24.6* 24.8* 24.1*   MCV 93.2 92.9 91.3   MCH 28.8 28.5 29.9   MCHC 30.9* 30.6* 32.8   RDW 14.2 13.9 13.9   PLT 56* 71* 68*   MPV 13.3* 12.6* 12.4*     Recent Labs     03/14/23  0552 03/15/23  0505 03/16/23  0805    140 139   K 3.8 4.0 3.9    103 102   CO2 24 24 26   BUN 66* 68* 47*   CREATININE 6.5* 6.7* 5.0*   GLUCOSE 80 80 85   CALCIUM 8.0* 8.0* 7.7*   PROT 5.6* 5.2* 5.3*   LABALBU 3.2* 3.0* 2.9*   BILITOT 0.5 0.5 0.5   ALKPHOS 133* 125* 143*   AST 26 23 27   ALT 8 8 10       Wound Documentation:   See EMR-patient does have multiple scratches on her body from scratching.     Assessment:  Multifactorial anasarca with progressive, end-stage renal failure and cirrhosis with decompensation  Acutely decompensated cirrhosis with ascites status post paracentesis performed 3/9/23 with removal of 7150 mL of ascitic fluid and repeat paracentesis performed on 3/14 with removal of approximately 3450 mL. Acute on End-stage renal disease with possible hepatorenal syndrome with consideration for initiation of RRT. Acute on chronic normochromic normocytic anemia with iron deficiency status post blood transfusion and iron  Essential hypertension. Newly identified hypothyroidism with TSH of 11.870  Hyperuricemia  Depression. 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 Plan:   Patient dialysis yesterday tolerated well. She is for another treatment today. nephrology is following patient and dialysis is at their discretion. Plan is for patient to go to outpatient dialysis in [address] [place] [address]. Discharge planning pending arrangements.  also assisting with discharge planning. Patient has been accepted at [facility] for skilled nursing. No pre-CERT needed. GI is following patient recommendations of been reviewed. Continue diuretic therapy. Daily weights and strict intake and output. Protonix for GI prophylaxis. Anticoagulation on hold secondary to anemia. PT, OT to evaluate and treat. Chronic comorbidities, labs and vital signs are being monitored and addressed accordingly. Taviaherene Pro requires this high level of physician care and nursing on the IMC/Telemetry unit due the complexity of decision management and chance of rapid decline or death. Continued cardiac monitoring and higher level of nursing are required. I am readily available for any further decision-making and intervention. More than 50% of my  time was spent at the bedside counseling/coordinating care with the patient and/or family with face to face contact. This time was spent reviewing notes and laboratory data as well as instructing and counseling the patient. Time I spent with the family or surrogate(s) is included only if the patient was incapable of providing the necessary information or participating in medical decisions.  I also discussed the differential diagnosis and all of the proposed management plans with the patient and individuals accompanying the patient.    The patient was seen, examined and then discussed with Dr. Wayne.       QUINTEN Merino - CARLITO,   3/16/2023  4:03 PM       I saw and evaluated the patient. I agree with the findings and the plan of care as documented in Chantal Bain NP-C's  note.    Rj Wayne DO, D.O., FACOI  4:28 PM  3/16/2023

## 2023-03-16 NOTE — PLAN OF CARE
Problem: Safety - Adult  Goal: Free from fall injury  Outcome: Progressing     Problem: Skin/Tissue Integrity  Goal: Absence of new skin breakdown  Description: 1. Monitor for areas of redness and/or skin breakdown  2. Assess vascular access sites hourly  3. Every 4-6 hours minimum:  Change oxygen saturation probe site  4. Every 4-6 hours:  If on nasal continuous positive airway pressure, respiratory therapy assess nares and determine need for appliance change or resting period.   Outcome: Progressing     Problem: Pain  Goal: Verbalizes/displays adequate comfort level or baseline comfort level  Outcome: Progressing     Problem: ABCDS Injury Assessment  Goal: Absence of physical injury  Outcome: Progressing     Problem: Nutrition Deficit:  Goal: Optimize nutritional status  3/15/2023 2324 by Joe Potts RN  Outcome: Progressing

## 2023-03-16 NOTE — PROGRESS NOTES
Physical Therapy Treatment Note/Plan of Care    Room #:  2566/0917-37  Patient Name: Allyson Bautista  YOB: 1950  MRN: [id number]    Date of Service: 3/16/2023     Tentative placement recommendation: Subacute Rehab  Equipment recommendation: Julissa Palafox      Evaluating Physical Therapist: Sharri Reed PT #[id number]      Specific Provider Orders/Date/Referring Provider : 03/08/23 1115    PT eval and treat  Start:  03/08/23 1115,   End:  03/08/23 1115,   ONE TIME,   Standing Count:  1 Occurrences,   R         Nickolas Guerra DO      Admitting Diagnosis:   Acute renal failure (ARF) ([facility] [address] [N17.9]     Sridevi Cortez yesterday-lost her balance- legs are too weak to support her    Went to [facility]- also has \"fluid in her belly and fluid in her legs\"  Surgery: none      Patient Active Problem List   Diagnosis    End stage renal disease ([facility] [address]    Encounter regarding vascular access for dialysis for end-stage renal disease ([facility] [address]    Acute renal failure (ARF) ([facility] [address]        ASSESSMENT of Current Deficits Patient exhibits decreased strength, balance, and endurance impairing functional mobility, transfers, gait , gait distance, and tolerance to activity. Pt able to perform 1 step up with moderate assist. Patient requires continued skilled physical therapy to address concerns listed above for increased safety and function at discharge .         PHYSICAL THERAPY  PLAN OF CARE       Physical therapy plan of care is established based on physician order,  patient diagnosis and clinical assessment    Current Treatment Recommendations:    -Bed Mobility: Lower extremity exercises   -Sitting Balance: Incorporate reaching activities to activate trunk muscles   -Standing Balance: Perform strengthening exercises in standing to promote motor control with or without upper extremity support   -Transfers: Provide instruction on proper hand and foot position for adequate transfer of weight onto lower extremities and use of gait device if needed and Cues for hand placement, technique and safety. Provide stabilization to prevent fall   -Gait: Gait training, Standing activities to improve: base of support, weight shift, weight bearing , and Pregait training to emphasize: Sequencing , Device control, Safety, and pacing   -Endurance: Utilize Supervised activities to increase level of endurance to allow for safe functional mobility including transfers and gait  and Use graduated activities to promote good breathing techniques and provide support and education to maximize respiratory function    PT long term treatment goals are located in below grid    Patient and or family understand(s) diagnosis, prognosis, and plan of care. Frequency of treatments: Patient will be seen  daily. Prior Level of Function: Patient ambulated independently    Rehab Potential: good - for baseline    Past medical history:   Past Medical History:   Diagnosis Date    Anemia     iron deficiency    Hypertension     Kidney failure      Past Surgical History:   Procedure Laterality Date    CT BIOPSY RENAL  11/10/2022    CT BIOPSY RENAL 11/10/2022 Juan Carvalho MD [facility]    DIALYSIS FISTULA CREATION Left 2/10/2023    CREATION ARTERIOVENOUS FISTULA LEFT ARM performed by Ran Yi MD at [facility] N/A 3/14/2023    EGD ESOPHAGOGASTRODUODENOSCOPY performed by Marco Antonio Galo MD at [facility]:    Precautions:    , falls ,      Imaging results: No results found. Social history: Patient lives with son in a ranch home  with 3 steps, bilateral rails  to enter home.    Walk in shower  , built in shower chair     Equipment owned: Cane and Quad cane,       AM-PAC Basic Mobility        AM-PAC Basic Mobility - Inpatient   How much help is needed turning from your back to your side while in a flat bed without using bedrails?: A Little  How much help is needed moving from lying on your back to sitting on the side of a flat bed without using bedrails?: A Lot  How much help is needed moving to and from a bed to a chair?: A Little  How much help is needed standing up from a chair using your arms?: A Little  How much help is needed walking in hospital room?: A Little  How much help is needed climbing 3-5 steps with a railing?: A Lot  AM-PAC Inpatient Mobility Raw Score : 16  AM-PAC Inpatient T-Scale Score : 40.78  Mobility Inpatient CMS 0-100% Score: 54.16  Mobility Inpatient CMS G-Code Modifier : CK    Nursing cleared patient for PT treatment. OBJECTIVE;   Initial Evaluation  Date: 3/9/2023 Treatment Date:    3/16/2023   Short Term/ Long Term   Goals   Was pt agreeable to Eval/treatment? Yes Yes  To be met in 3 days   Pain level   0/10    No pain     Bed Mobility    Rolling: Not assessed patient in chair     Rolling: Minimal assist of 1   Supine to sit: Minimal assist of 1   Sit to supine: Not assessed patient in chair   Scooting: Supervision     Rolling: Independent    Supine to sit: Independent    Sit to supine: Independent    Scooting: Independent     Transfers Sit to stand: Minimal assist of 1 Cues for hand placement and safety  Sit to stand: Minimal assist of 1 cues for hand placement   Sit to stand: Supervision      Ambulation     2 x 20 feet using  wheeled walker with Minimal assist of 1   for walker control and cues for walker approximation, safety, and pacing 2x50 feet using  wheeled walker with Minimal assist of 1   for walker approximation, balance, and safety obstacle negotiation.       2 x 50 feet using  wheeled walker with Supervision     Stair negotiation: ascended and descended   Not assessed  1 step up holding on to rail and moderate assist    4 stairs with 1 HR with Supervision   ROM Within functional limits    Increase range of motion 10% of affected joints    Strength BUE:  refer to OT eval  RLE:  3+/5  LLE:  3+/5  Increase strength in affected mm groups by 1/3 grade   Balance Sitting EOB:  not assessed Dynamic Standing:  fair wheeled walker  Sitting EOB: fair   Dynamic Standing: fair    Sitting EOB:  good    Dynamic Standing: good -wheeled walker      Patient is Alert & Oriented x person, place, time, and situation and follows directions    Sensation:  Patient  denies numbness/tingling   Edema:  yes bilateral lower extremities   Endurance: fair       Vitals: room air   Blood Pressure at rest  Blood Pressure during session    Heart Rate at rest  Heart Rate during session    SPO2 at rest %  SPO2 during session %     Patient education  Patient educated on role of Physical Therapy, risks of immobility, safety and plan of care,  importance of mobility while in hospital , ankle pumps, quad set and glut set for edema control, blood clot prevention, and positioning for skin integrity and comfort     Patient response to education:   Pt verbalized understanding Pt demonstrated skill Pt requires further education in this area   Yes Partial Yes      Treatment:  Patient practiced and was instructed/facilitated in the following treatment: rolled multiple times d/t bowel movement. Therapist assist for hygiene and chux change. Sat edge of bed 15 minutes with Supervision  to increase dynamic sitting balance and activity tolerance. Stood ambulated as above in chart and performed seated exercise. Assisted to chair. Performed step up. Assisted up in chair      Therapeutic Exercises:  ankle pumps, heel raises, hip abduction/adduction, and seated marching  2 x 15 reps. At end of session, patient in chair with  son present  call light and phone within reach,  all lines and tubes intact, nursing notified. Patient would benefit from continued skilled Physical Therapy to improve functional independence and quality of life.          Patient's/ family goals   get stronger    Time in  0845  Time out  0923    Total Treatment Time  38 minutes    CPT codes:    Therapeutic activities (96540)   15 minutes  1 unit(s)  Therapeutic exercises (98836)   15 minutes  1 unit(s)  Gait Training (70200) 13 minutes 1 unit(s)    Earnest Barrow PT

## 2023-03-16 NOTE — PROGRESS NOTES
PROGRESS NOTE  By Glen Omer M.D. [facility]  Dr. Mendoza Puentes M.D.,  Dr. Kalee Rosas M.D.,   Dr. Johnson Fontanez D.O.,  Dr. Rashard Mclaughlin M.D.,  Dr. Fabiola Perez D.O.,          Calvin Prince  74 y.o.  female    SUBJECTIVE:  No new complaints. Family (son) at bedside    OBJECTIVE:    BP (!) 111/52   Pulse 75   Temp 98 °F (36.7 °C) (Oral)   Resp 16   Ht 5' 2\" (1.575 m)   Wt 160 lb 11.5 oz (72.9 kg)   SpO2 93%   BMI 29.40 kg/m²     General: NAD/ female  HEENT: Anicteric sclera/moist oral mucosa  Neck: Supple with trachea midline  Chest: Symmetric excursion/nonlabored respirations  Cor: Regular  Abd.: Soft and nontender  Extr.:  BLE edema unchanged  Skin: Warm and dry      DATA:     Lab Results   Component Value Date/Time    WBC 3.4 03/16/2023 08:05 AM    RBC 2.64 03/16/2023 08:05 AM    HGB 7.9 03/16/2023 08:05 AM    HCT 24.1 03/16/2023 08:05 AM    MCV 91.3 03/16/2023 08:05 AM    MCH 29.9 03/16/2023 08:05 AM    MCHC 32.8 03/16/2023 08:05 AM    RDW 13.9 03/16/2023 08:05 AM    PLT 68 03/16/2023 08:05 AM    MPV 12.4 03/16/2023 08:05 AM     Lab Results   Component Value Date/Time     03/16/2023 08:05 AM    K 3.9 03/16/2023 08:05 AM    K 3.9 02/10/2023 07:40 AM     03/16/2023 08:05 AM    CO2 26 03/16/2023 08:05 AM    BUN 47 03/16/2023 08:05 AM    CREATININE 5.0 03/16/2023 08:05 AM    CALCIUM 7.7 03/16/2023 08:05 AM    PROT 5.3 03/16/2023 08:05 AM    LABALBU 2.9 03/16/2023 08:05 AM    BILITOT 0.5 03/16/2023 08:05 AM    ALKPHOS 143 03/16/2023 08:05 AM    AST 27 03/16/2023 08:05 AM    ALT 10 03/16/2023 08:05 AM     No results found for: LIPASE  No results found for: AMYLASE      ASSESSMENT/PLAN:  Patient Active Problem List   Diagnosis    End stage renal disease ([facility] [address]    Encounter regarding vascular access for dialysis for end-stage renal disease ([facility] [address]    Acute renal failure (ARF) ([address]     1.   Cirrhosis  -Decompensated/nonalcoholic  --Monitor MELD labs  -Ultrasound/Doppler ultrasound reveals patent portal/hepatic veins  -Nonelevated AFP     2. Anemia  -Acute on chronic  -Iron deficient  -No evidence of overt bleed but decreased H&H/positive FOBT  -EGD 3/14 revealing no bleeding source and no significant varices  -Consider nuclear medicine bleeding scan if further decrease in H&H     3. Ascites  -S/P paracentesis -unknown amount removed as noted is pending  -Ascitic fluid analysis not consistent with SBP  -Defer diuretics to admitting/nephrology  -Plan to repeat paracentesis today      4. Renal disease  -CKD stage IV-V  -Per admitting/pertinent consultants     Above has been discussed with patient and her son at bedside and all questions answered to their satisfaction. They verbalized understanding and agreement with the plan as delineated     Odalis Gray MD  3/16/2023  11:32 AM    NOTE:  This report was transcribed using voice recognition software. Every effort was made to ensure accuracy; however, inadvertent computerized transcription errors may be present.

## 2023-03-16 NOTE — PROGRESS NOTES
[facility]  Nephrology Attending Progress Note      SUBJECTIVE:     3/16/23-she is awake and alert. She was eating lunch at the time of my visit today. States she tolerated first dialysis yesterday well. Son is at the bedside and was updated    PROBLEM LIST:    Patient Active Problem List   Diagnosis    End stage renal disease ([facility] [address]    Encounter regarding vascular access for dialysis for end-stage renal disease ([facility] [address]    [facility]        PAST MEDICAL HISTORY:    Past Medical History:   Diagnosis Date    Anemia     iron deficiency    Hypertension     Kidney failure        DIET:    ADULT DIET; Regular;  Low Sodium (2 gm)  ADULT ORAL NUTRITION SUPPLEMENT; Dinner; Frozen Oral Supplement     PHYSICAL EXAM:     Patient Vitals for the past 24 hrs:   BP Temp Temp src Pulse Resp SpO2 Height Weight   03/16/23 0950 (!) 111/52 -- -- 75 -- -- -- --   03/16/23 0710 (!) 125/43 98 °F (36.7 °C) Oral 82 16 93 % -- --   03/16/23 0000 (!) 120/52 -- -- -- -- -- -- --   03/15/23 2345 -- 98.3 °F (36.8 °C) -- -- -- -- -- --   03/15/23 2012 (!) 112/52 -- -- -- -- -- -- --   03/15/23 1845 (!) 112/52 98.5 °F (36.9 °C) -- 72 -- 98 % -- --   03/15/23 1556 -- -- -- -- -- -- 5' 2\" (1.575 m) --   03/15/23 1537 (!) 110/50 98.1 °F (36.7 °C) -- 70 16 -- -- 160 lb 11.5 oz (72.9 kg)   03/15/23 1515 (!) 103/53 -- -- 68 -- -- -- --   03/15/23 1500 (!) 103/51 -- -- 66 -- -- -- --   03/15/23 1445 (!) 104/46 -- -- 67 -- -- -- --   03/15/23 1430 (!) 98/46 -- -- 68 -- -- -- --   03/15/23 1415 (!) 99/47 -- -- 70 -- -- -- --   03/15/23 1400 (!) 101/50 -- -- 71 -- -- -- --   03/15/23 1345 (!) 101/49 -- -- 70 -- -- -- --   03/15/23 1337 (!) 103/49 -- -- 72 -- -- -- --   03/15/23 1330 (!) 103/49 98.2 °F (36.8 °C) -- 72 16 -- -- 161 lb 13.1 oz (73.4 kg)   03/15/23 1207 (!) 112/55 -- -- -- -- -- -- --   @      Intake/Output Summary (Last 24 hours) at 3/16/2023 1120  Last data filed at 3/15/2023 1748  Gross per 24 hour   Intake 300 ml Output 1550 ml   Net -1250 ml         Wt Readings from Last 3 Encounters:   03/15/23 160 lb 11.5 oz (72.9 kg)   02/27/23 178 lb (80.7 kg)   02/10/23 167 lb (75.8 kg)       Constitutional:  Pt is in no acute distress  Head: normocephalic, atraumatic  Neck: no JVD right IJ tunneled dialysis catheter  Cardiovascular: S1 S2 no S3 or rub  Respiratory: Clear upper diminished bases  Gastrointestinal:  Soft, nontender, distended  Ext: ++ edema   Skin: dry, no rash  Neuro: Awake alert and oriented    MEDS (scheduled):    epoetin gayle-epbx  10,000 Units SubCUTAneous Once per day on Mon Wed Fri    pantoprazole  40 mg IntraVENous BID    bumetanide  2 mg IntraVENous Q8H    polyethylene glycol  17 g Oral Daily    cholestyramine  1 packet Oral BID    folic acid  1 mg Oral Daily    colchicine  0.6 mg Oral Every Other Day    levothyroxine  100 mcg Oral Daily    ferrous sulfate  325 mg Oral Once per day on Mon Wed Fri    amLODIPine  20 mg Oral Daily    FLUoxetine  20 mg Oral Daily    hydrALAZINE  20 mg Oral BID    OLANZapine zydis  2.5 mg Oral Nightly    sodium bicarbonate  650 mg Oral BID    vitamin B-12  1,000 mcg Oral Daily    zinc gluconate  50 mg Oral Daily    [Held by provider] pantoprazole  40 mg Oral QAM AC    vitamin D  50,000 Units Oral Weekly       MEDS (infusions):   sodium chloride         MEDS (prn):  albumin human, sodium chloride, perflutren lipid microspheres    DATA:    Recent Labs     03/14/23  0552 03/15/23  0505 03/16/23  0805   WBC 3.5* 4.0* 3.4*   HGB 7.6* 7.6* 7.9*   HCT 24.6* 24.8* 24.1*   MCV 93.2 92.9 91.3   PLT 56* 71* 68*     Recent Labs     03/14/23  0552 03/15/23  0505 03/16/23  0805    140 139   K 3.8 4.0 3.9    103 102   CO2 24 24 26   BUN 66* 68* 47*   CREATININE 6.5* 6.7* 5.0*   LABGLOM 6 6 9   GLUCOSE 80 80 85   CALCIUM 8.0* 8.0* 7.7*   ALT 8 8 10   AST 26 23 27   BILITOT 0.5 0.5 0.5   ALKPHOS 133* 125* 143*   MG 2.5 2.5 2.3   PHOS 4.8* 5.2* 3.9       Lab Results   Component Value Date    LABALBU 2.9 (L) 03/16/2023    LABALBU 3.0 (L) 03/15/2023    LABALBU 3.2 (L) 03/14/2023     Lab Results   Component Value Date    TSH 11.870 (H) 03/09/2023       Iron Studies  Lab Results   Component Value Date    IRON 28 (L) 03/09/2023    TIBC 157 (L) 03/09/2023    FERRITIN 264 03/09/2023     Vitamin B-12   Date Value Ref Range Status   03/09/2023 >2000 (H) 211 - 946 pg/mL Final     Folate   Date Value Ref Range Status   03/09/2023 3.3 (L) 4.8 - 24.2 ng/mL Final       Vit D, 25-Hydroxy   Date Value Ref Range Status   03/08/2023 <6 (L) 30 - 100 ng/mL Final     Comment:     <20 ng/mL. ........... Sonali Danes Deficient  20-30 ng/mL. ......... Sonali Danes Insufficient   ng/mL. ........ Sonali Danes Sufficient  >100 ng/mL. .......... Sonali Danes Toxic       PTH   Date Value Ref Range Status   03/09/2023 104 (H) 15 - 65 pg/mL Final       No components found for: URIC    Lab Results   Component Value Date/Time    COLORU Yellow 03/08/2023 05:00 PM    NITRU Negative 03/08/2023 05:00 PM    GLUCOSEU Negative 03/08/2023 05:00 PM    KETUA Negative 03/08/2023 05:00 PM    UROBILINOGEN 0.2 03/08/2023 05:00 PM    BILIRUBINUR Negative 03/08/2023 05:00 PM       No results found for: Guera Masterson      IMPRESSION/RECOMMENDATIONS:      Chronic kidney disease stage V-  In the setting of biopsy-proven IgA nephropathy  Recent baseline serum creatinine 4.37 to 4.50 mg/dL  Creatinine is continued to trend upward. Patient does have an immature upper extremity AV fistula. She is for intervention with Dr. Lori Guillaume April 6. Tunneled dialysis catheter placed 3/15 with first dialysis thereafter. Net UF with first dialysis 500 mL  Second dialysis 3/16    2. Volume overload-  In the setting of chronic kidney disease and cirrhosis  She is status post paracentesis 3/14 for 3450 mL  She had 750 mL of urine out recorded for 1 shift only  We will plan further volume removal with initiation of hemodialysis as tolerated    3.   Hypertension with chronic kidney disease stage V-  Now with hypotension  Blood pressure goal less than 130/80  Will discontinue hydralazine  Lower dose of amlodipine from 20 mg daily to 5 mg daily and hold for systolic blood pressure less than 110    4. Secondary hyperparathyroidism of renal origin-  ; vitamin D<6 on 3/9  She has been started on ergocalciferol 50,000 units weekly  Calcium 7.7; phosphorus 3.9  She is not currently on a phosphorus binder. 5.  Anemia of chronic kidney disease-  With history of pernicious anemia and folate deficiency. Further exacerbated by GI bleed  Plan transfusion for hemoglobin less than 7  Hemoglobin 7.9  Continue on VIKKI    Patient should be okay for discharge within the next 48 hours from a renal standpoint pending outpatient dialysis arrangements in [address] [place] [address]      Patient seen and examined. Patient's son is present at the bedside. Chart reviewed. Patient had her first hemodialysis treatment yesterday, which was uneventful. Patient with no complaints. Blood pressures has been low. Patient was on high-dose amlodipine, which will be cut significantly down to 5 mg a day. We'll discontinue hydralazine. I had a face to face encounter with the patient. Agree with exam.    Agree with  formulation, assessment and plan as outlined above and directed by me. Addition and corrections were done as deemed appropriate. My exam and plan include:     Continue current treatment as outlined above.                  Suresh Reyes MD  Nephrology        Electronically signed by Suresh Reyes MD on 3/16/2023 at 6:40 PM

## 2023-03-16 NOTE — FLOWSHEET NOTE
03/16/23 1640   Vital Signs   BP (!) 126/58   Temp 97.6 °F (36.4 °C)   Heart Rate 73   Resp 18   Weight 160 lb 4.4 oz (72.7 kg)   Weight Method Bed scale   Percent Weight Change -2.02   Pain Assessment   Pain Assessment None - Denies Pain   Post-Hemodialysis Assessment   Post-Treatment Procedures Blood returned;Catheter capped, clamped with Citrate x 2 ports   Machine Disinfection Process Acid/Vinegar Clean;Bleach; Exterior Machine Disinfection   Rinseback Volume (ml) 300 ml   Blood Volume Processed (Liters) 50.2 l/min   Dialyzer Clearance Lightly streaked   Duration of Treatment (minutes) 180 minutes   Hemodialysis Intake (ml) 300 ml   Hemodialysis Output (ml) 1800 ml   NET Removed (ml) 1500   Tolerated Treatment Good   Patient Response to Treatment tolerated well   Bilateral Breath Sounds Diminished   Edema Right lower extremity; Left lower extremity   RLE Edema +2   LLE Edema +2   Time Off 1630   Patient Disposition Return to room

## 2023-03-17 VITALS
TEMPERATURE: 98.4 F | OXYGEN SATURATION: 96 % | BODY MASS INDEX: 28.76 KG/M2 | RESPIRATION RATE: 20 BRPM | SYSTOLIC BLOOD PRESSURE: 113 MMHG | WEIGHT: 156.31 LBS | HEIGHT: 62 IN | HEART RATE: 86 BPM | DIASTOLIC BLOOD PRESSURE: 50 MMHG

## 2023-03-17 LAB
ALBUMIN SERPL-MCNC: 2.8 G/DL (ref 3.5–5.2)
ALP SERPL-CCNC: 154 U/L (ref 35–104)
ALT SERPL-CCNC: 11 U/L (ref 0–32)
ANION GAP SERPL CALCULATED.3IONS-SCNC: 8 MMOL/L (ref 7–16)
AST SERPL-CCNC: 30 U/L (ref 0–31)
BASOPHILS # BLD: 0 E9/L (ref 0–0.2)
BASOPHILS NFR BLD: 0 % (ref 0–2)
BILIRUB SERPL-MCNC: 0.4 MG/DL (ref 0–1.2)
BUN SERPL-MCNC: 27 MG/DL (ref 6–23)
CALCIUM SERPL-MCNC: 7.6 MG/DL (ref 8.6–10.2)
CHLORIDE SERPL-SCNC: 100 MMOL/L (ref 98–107)
CO2 SERPL-SCNC: 27 MMOL/L (ref 22–29)
CREAT SERPL-MCNC: 3.2 MG/DL (ref 0.5–1)
EOSINOPHIL # BLD: 0.04 E9/L (ref 0.05–0.5)
EOSINOPHIL NFR BLD: 1 % (ref 0–6)
ERYTHROCYTE [DISTWIDTH] IN BLOOD BY AUTOMATED COUNT: 13.9 FL (ref 11.5–15)
GLUCOSE SERPL-MCNC: 84 MG/DL (ref 74–99)
HCT VFR BLD AUTO: 23.7 % (ref 34–48)
HGB BLD-MCNC: 7.7 G/DL (ref 11.5–15.5)
HYPOCHROMIA: ABNORMAL
LYMPHOCYTES # BLD: 0.47 E9/L (ref 1.5–4)
LYMPHOCYTES NFR BLD: 13 % (ref 20–42)
MAGNESIUM SERPL-MCNC: 2.1 MG/DL (ref 1.6–2.6)
MCH RBC QN AUTO: 29.4 PG (ref 26–35)
MCHC RBC AUTO-ENTMCNC: 32.5 % (ref 32–34.5)
MCV RBC AUTO: 90.5 FL (ref 80–99.9)
MONOCYTES # BLD: 0.25 E9/L (ref 0.1–0.95)
MONOCYTES NFR BLD: 7 % (ref 2–12)
NEUTROPHILS # BLD: 2.84 E9/L (ref 1.8–7.3)
NEUTS SEG NFR BLD: 79 % (ref 43–80)
PHOSPHATE SERPL-MCNC: 2.7 MG/DL (ref 2.5–4.5)
PLATELET # BLD AUTO: 58 E9/L (ref 130–450)
PLATELET CONFIRMATION: NORMAL
PMV BLD AUTO: 13.3 FL (ref 7–12)
POLYCHROMASIA: ABNORMAL
POTASSIUM SERPL-SCNC: 3.9 MMOL/L (ref 3.5–5)
PROT SERPL-MCNC: 5.1 G/DL (ref 6.4–8.3)
RBC # BLD AUTO: 2.62 E12/L (ref 3.5–5.5)
SARS-COV-2 RDRP RESP QL NAA+PROBE: NOT DETECTED
SODIUM SERPL-SCNC: 135 MMOL/L (ref 132–146)
WBC # BLD: 3.6 E9/L (ref 4.5–11.5)

## 2023-03-17 PROCEDURE — 36415 COLL VENOUS BLD VENIPUNCTURE: CPT

## 2023-03-17 PROCEDURE — 80053 COMPREHEN METABOLIC PANEL: CPT

## 2023-03-17 PROCEDURE — 85025 COMPLETE CBC W/AUTO DIFF WBC: CPT

## 2023-03-17 PROCEDURE — 6360000002 HC RX W HCPCS: Performed by: NURSE PRACTITIONER

## 2023-03-17 PROCEDURE — 6370000000 HC RX 637 (ALT 250 FOR IP): Performed by: NURSE PRACTITIONER

## 2023-03-17 PROCEDURE — 83735 ASSAY OF MAGNESIUM: CPT

## 2023-03-17 PROCEDURE — 6370000000 HC RX 637 (ALT 250 FOR IP): Performed by: INTERNAL MEDICINE

## 2023-03-17 PROCEDURE — 87635 SARS-COV-2 COVID-19 AMP PRB: CPT

## 2023-03-17 PROCEDURE — 84100 ASSAY OF PHOSPHORUS: CPT

## 2023-03-17 PROCEDURE — 90935 HEMODIALYSIS ONE EVALUATION: CPT

## 2023-03-17 RX ORDER — AMLODIPINE BESYLATE 5 MG/1
5 TABLET ORAL DAILY
Qty: 30 TABLET | Refills: 3 | DISCHARGE
Start: 2023-03-17

## 2023-03-17 RX ORDER — POLYETHYLENE GLYCOL 3350 17 G/17G
17 POWDER, FOR SOLUTION ORAL DAILY
Qty: 527 G | Refills: 1 | DISCHARGE
Start: 2023-03-17 | End: 2023-04-16

## 2023-03-17 RX ORDER — ERGOCALCIFEROL 1.25 MG/1
50000 CAPSULE ORAL WEEKLY
Qty: 5 CAPSULE | DISCHARGE
Start: 2023-03-22

## 2023-03-17 RX ORDER — CHOLESTYRAMINE 4 G/9G
1 POWDER, FOR SUSPENSION ORAL 2 TIMES DAILY
Qty: 90 PACKET | Refills: 3 | DISCHARGE
Start: 2023-03-17

## 2023-03-17 RX ORDER — LEVOTHYROXINE SODIUM 0.05 MG/1
100 TABLET ORAL DAILY
DISCHARGE
Start: 2023-03-18

## 2023-03-17 RX ORDER — LEVOTHYROXINE SODIUM 0.1 MG/1
100 TABLET ORAL DAILY
Qty: 30 TABLET | Refills: 3 | DISCHARGE
Start: 2023-03-18 | End: 2023-03-17 | Stop reason: SDUPTHER

## 2023-03-17 RX ORDER — FOLIC ACID 1 MG/1
1 TABLET ORAL DAILY
Qty: 30 TABLET | Refills: 3 | DISCHARGE
Start: 2023-03-17

## 2023-03-17 RX ORDER — COLCHICINE 0.6 MG/1
0.6 TABLET ORAL EVERY OTHER DAY
Qty: 30 TABLET | Refills: 3 | DISCHARGE
Start: 2023-03-17

## 2023-03-17 RX ADMIN — FLUOXETINE 20 MG: 20 CAPSULE ORAL at 14:28

## 2023-03-17 RX ADMIN — Medication 50 MG: at 14:25

## 2023-03-17 RX ADMIN — SODIUM BICARBONATE 650 MG: 650 TABLET ORAL at 14:26

## 2023-03-17 RX ADMIN — EPOETIN ALFA-EPBX 10000 UNITS: 10000 INJECTION, SOLUTION INTRAVENOUS; SUBCUTANEOUS at 14:30

## 2023-03-17 RX ADMIN — CHOLESTYRAMINE 4 G: 4 POWDER, FOR SUSPENSION ORAL at 14:25

## 2023-03-17 RX ADMIN — CYANOCOBALAMIN TAB 1000 MCG 1000 MCG: 1000 TAB at 14:26

## 2023-03-17 RX ADMIN — LEVOTHYROXINE SODIUM 100 MCG: 100 TABLET ORAL at 06:08

## 2023-03-17 RX ADMIN — COLCHICINE 0.6 MG: 0.6 TABLET, FILM COATED ORAL at 14:30

## 2023-03-17 RX ADMIN — FOLIC ACID 1 MG: 1 TABLET ORAL at 14:28

## 2023-03-17 ASSESSMENT — PAIN SCALES - GENERAL
PAINLEVEL_OUTOF10: 0
PAINLEVEL_OUTOF10: 0

## 2023-03-17 NOTE — CARE COORDINATION
Patient of the floor during rounds. Discharge orders noted. Follow-up in the office in 2 to 3 weeks after discharge -patient's son to call for appointment and with questions/concerns at [phone]. Thank you for the opportunity to participate in the care of .  Milena Schilling MD

## 2023-03-17 NOTE — PROGRESS NOTES
[facility]  Nephrology Attending Progress Note      SUBJECTIVE:     3/17/23-seen on dialysis this morning. She is tolerating treatment well and resting comfortably in bed. PROBLEM LIST:    Patient Active Problem List   Diagnosis    End stage renal disease ([facility]    Encounter regarding vascular access for dialysis for end-stage renal disease ([facility]    Acute renal failure (ARF) ([facility]        PAST MEDICAL HISTORY:    Past Medical History:   Diagnosis Date    Anemia     iron deficiency    Hypertension     Kidney failure        DIET:    ADULT DIET; Regular;  Low Sodium (2 gm)  ADULT ORAL NUTRITION SUPPLEMENT; Dinner; Frozen Oral Supplement     PHYSICAL EXAM:     Patient Vitals for the past 24 hrs:   BP Temp Pulse Resp SpO2 Weight   03/17/23 0900 (!) 107/52 -- 80 -- -- --   03/17/23 0850 (!) 110/53 98.2 °F (36.8 °C) 80 18 -- 159 lb 9.8 oz (72.4 kg)   03/17/23 0645 (!) 114/44 98.9 °F (37.2 °C) 79 18 95 % --   03/16/23 1845 (!) 119/52 98.6 °F (37 °C) 79 18 98 % --   03/16/23 1640 (!) 126/58 97.6 °F (36.4 °C) 73 18 -- 160 lb 4.4 oz (72.7 kg)   03/16/23 1600 (!) 118/59 -- 72 -- -- --   03/16/23 1530 (!) 109/55 -- 73 -- -- --   03/16/23 1500 (!) 104/48 -- 71 -- -- --   03/16/23 1430 (!) 108/56 -- 67 -- -- --   03/16/23 1400 (!) 106/54 -- 73 -- -- --   03/16/23 1330 (!) 117/56 -- 73 -- -- --   03/16/23 1315 (!) 114/54 97.8 °F (36.6 °C) 73 18 -- 163 lb 9.3 oz (74.2 kg)   03/16/23 0950 (!) 111/52 -- 75 -- -- --   @      Intake/Output Summary (Last 24 hours) at 3/17/2023 0919  Last data filed at 3/16/2023 1640  Gross per 24 hour   Intake 300 ml   Output 1800 ml   Net -1500 ml         Wt Readings from Last 3 Encounters:   03/17/23 159 lb 9.8 oz (72.4 kg)   02/27/23 178 lb (80.7 kg)   02/10/23 167 lb (75.8 kg)       Constitutional:  Pt is in no acute distress  Head: normocephalic, atraumatic  Neck: no JVD right IJ tunneled dialysis catheter  Cardiovascular: S1 S2 no S3 or rub  Respiratory: Clear upper diminished bases  Gastrointestinal:  Soft, nontender, distended  Ext: ++ edema   Skin: dry, no rash  Neuro: Awake alert and oriented    MEDS (scheduled):    amLODIPine  5 mg Oral Daily    epoetin gayle-epbx  10,000 Units SubCUTAneous Once per day on Mon Wed Fri    pantoprazole  40 mg IntraVENous BID    bumetanide  2 mg IntraVENous Q8H    polyethylene glycol  17 g Oral Daily    cholestyramine  1 packet Oral BID    folic acid  1 mg Oral Daily    colchicine  0.6 mg Oral Every Other Day    levothyroxine  100 mcg Oral Daily    ferrous sulfate  325 mg Oral Once per day on Mon Wed Fri    FLUoxetine  20 mg Oral Daily    OLANZapine zydis  2.5 mg Oral Nightly    sodium bicarbonate  650 mg Oral BID    vitamin B-12  1,000 mcg Oral Daily    zinc gluconate  50 mg Oral Daily    [Held by provider] pantoprazole  40 mg Oral QAM AC    vitamin D  50,000 Units Oral Weekly       MEDS (infusions):   sodium chloride         MEDS (prn):  albumin human, sodium chloride, perflutren lipid microspheres    DATA:    Recent Labs     03/15/23  0505 03/16/23  0805 03/17/23  0727   WBC 4.0* 3.4* 3.6*   HGB 7.6* 7.9* 7.7*   HCT 24.8* 24.1* 23.7*   MCV 92.9 91.3 90.5   PLT 71* 68* 58*     Recent Labs     03/15/23  0505 03/16/23  0805 03/17/23  0727    139 135   K 4.0 3.9 3.9    102 100   CO2 24 26 27   BUN 68* 47* 27*   CREATININE 6.7* 5.0* 3.2*   LABGLOM 6 9 15   GLUCOSE 80 85 84   CALCIUM 8.0* 7.7* 7.6*   ALT 8 10 11   AST 23 27 30   BILITOT 0.5 0.5 0.4   ALKPHOS 125* 143* 154*   MG 2.5 2.3 2.1   PHOS 5.2* 3.9 2.7       Lab Results   Component Value Date    LABALBU 2.8 (L) 03/17/2023    LABALBU 2.9 (L) 03/16/2023    LABALBU 3.0 (L) 03/15/2023     Lab Results   Component Value Date    TSH 11.870 (H) 03/09/2023       Iron Studies  Lab Results   Component Value Date    IRON 28 (L) 03/09/2023    TIBC 157 (L) 03/09/2023    FERRITIN 264 03/09/2023     Vitamin B-12   Date Value Ref Range Status   03/09/2023 >2000 (H) 211 - 946 pg/mL Final     Folate   Date Value Ref Range Status   03/09/2023 3.3 (L) 4.8 - 24.2 ng/mL Final       Vit D, 25-Hydroxy   Date Value Ref Range Status   03/08/2023 <6 (L) 30 - 100 ng/mL Final     Comment:     <20 ng/mL. ........... Author Gayle Deficient  20-30 ng/mL. ......... Author Gayle Insufficient   ng/mL. ........ Author Gayle Sufficient  >100 ng/mL. .......... Author Gayle Toxic       PTH   Date Value Ref Range Status   03/09/2023 104 (H) 15 - 65 pg/mL Final       No components found for: URIC    Lab Results   Component Value Date/Time    COLORU Yellow 03/08/2023 05:00 PM    NITRU Negative 03/08/2023 05:00 PM    GLUCOSEU Negative 03/08/2023 05:00 PM    KETUA Negative 03/08/2023 05:00 PM    UROBILINOGEN 0.2 03/08/2023 05:00 PM    BILIRUBINUR Negative 03/08/2023 05:00 PM       No results found for: Carrie Choi      IMPRESSION/RECOMMENDATIONS:      Chronic kidney disease stage V/ ESKD  In the setting of biopsy-proven IgA nephropathy  Recent baseline serum creatinine 4.37 to 4.50 mg/dL  Creatinine is continued to trend upward. Patient does have an immature upper extremity AV fistula. She is for intervention with Dr. Francisca Sylvester April 6. Tunneled dialysis catheter placed 3/15 with first dialysis thereafter. Net UF with first dialysis 500 mL  Second dialysis 3/16 and third 3/17  Patient chair time is Tuesday Thursday Saturday at [email or web address] in [facility] at 11:40 AM.  First treatment will be 3/21/2023.    2.  Volume overload-  In the setting of chronic kidney disease and cirrhosis  She is status post paracentesis 3/14 for 3450 mL  She had 750 mL of urine out recorded for 1 shift only  We will plan further volume removal with initiation of hemodialysis as tolerated    3. Hypertension with chronic kidney disease stage V-  Now with hypotension  Blood pressure goal less than 130/80  Off hydralazine  Amlodipine decreased from 20 mg daily to 5 mg daily and hold for systolic blood pressure less than 110    4.   Secondary hyperparathyroidism of renal origin-  ; vitamin D<6 on 3/9  She has been started on ergocalciferol 50,000 units weekly  Calcium 7.6; phosphorus 2.7  She is not currently on a phosphorus binder. 5.  Anemia of chronic kidney disease-  With history of pernicious anemia and folate deficiency. Further exacerbated by GI bleed  Plan transfusion for hemoglobin less than 7  Hemoglobin 7.7  Continue on VIKKI    Okay for discharge from a renal standpoint patient will start dialysis in the outpatient setting on Tuesday, 3/21/2023    QUINTEN Mills - CNP      Patient seen and examined on dialysis. .   Dialysis prescription reviewed. No family member is present at the bedside. Chart reviewed. I had a face to face encounter with the patient. Agree with exam.    Agree with  formulation, assessment and plan as outlined above and directed by me. Addition and corrections were done as deemed appropriate. My exam and plan include:     Continue current treatment as outlined above. Arrangements made for outpatient dialysis. Discussed with the patient's son in her room.                Kira Gates MD  Nephrology        Electronically signed by Kira Gates MD on 3/17/2023 at 1:13 PM

## 2023-03-17 NOTE — CARE COORDINATION
Ss note: 3/17/2023 9:28 AM RAPID COVID  ordered today. Discharge order noted. Pt is currently in 3rd HD treatment at this time. TIFFANY obtained an outpt chair time at [facility]  for Kody Yanez Sat at 11:40 am with first treatment on Tuesday 3-21-23, charge nurse has call out to Dr. Osker Phalen to determine if this is ok, unsure if center has a chair time for Saturday. Sw will provide schedule letter to son present in room. Pt has been accepted at [address] [place] [address] Rd. aware of outpt chair time. NO PRECERT for SNF. AWAITING HEP PANEL result which will need faxed to [phone]. Tiffany completed a Hens, will arrange transfer to SNF once nephrology has responded.  NAVJOT Ahmadi

## 2023-03-17 NOTE — PLAN OF CARE
Problem: Safety - Adult  Goal: Free from fall injury  Outcome: Completed     Problem: Skin/Tissue Integrity  Goal: Absence of new skin breakdown  Description: 1. Monitor for areas of redness and/or skin breakdown  2. Assess vascular access sites hourly  3. Every 4-6 hours minimum:  Change oxygen saturation probe site  4. Every 4-6 hours:  If on nasal continuous positive airway pressure, respiratory therapy assess nares and determine need for appliance change or resting period.   Outcome: Completed     Problem: Pain  Goal: Verbalizes/displays adequate comfort level or baseline comfort level  Outcome: Completed     Problem: ABCDS Injury Assessment  Goal: Absence of physical injury  Outcome: Completed     Problem: Nutrition Deficit:  Goal: Optimize nutritional status  Outcome: Completed

## 2023-03-17 NOTE — DISCHARGE SUMMARY
Internal Medicine Progress Note     [facility]     Sofi Welsh. Imer Crump., F.A.C.O.I. Isaias Reyes D.O., ISHMAELOGALINDO Salcido, MSN, APRN, NP-C  Keagan Lindsay, MSN, APRN-CNP       Internal Medicine  Discharge Summary    NAME: Migue Arce  :  1950  MRN:  [id number]  Danis Cortés MD  ADMITTED: 3/8/2023      DISCHARGED: 3/17/23    ADMITTING PHYSICIAN: Sofi Wayne DO    CONSULTANT(S):   IP CONSULT TO NEPHROLOGY  IP CONSULT TO GI  IP CONSULT TO ONCOLOGY  IP CONSULT TO PODIATRY  IP CONSULT TO SOCIAL WORK     ADMITTING DIAGNOSIS:   Acute renal failure (ARF) ([facility] [address] [N17.9]     DISCHARGE DIAGNOSES:   Multifactorial anasarca with progressive, end-stage renal failure and cirrhosis with decompensation  Acutely decompensated cirrhosis with ascites status post paracentesis performed 3/9/23 with removal of 7150 mL of ascitic fluid and repeat paracentesis performed on 3/14 with removal of approximately 3450 mL. Acute on End-stage renal disease with possible hepatorenal syndrome status postplacement of a tunneled dialysis catheter 2/15/2023 by IR And initiation on hemodialysis  Acute on chronic normochromic normocytic anemia with iron deficiency status post blood transfusion and iron infusion, status post benign EGD on 2023 by Dr. Flower Moreland hypertension. Newly identified hypothyroidism with TSH of 11.870  Hyperuricemia  Depression. 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 BRIEF HISTORY OF PRESENT ILLNESS:   This is a pleasant  78-year-old white female who was admitted to [address] [place] [address]. The  patient presented to the hospital here. Apparently, the patient did not  feel well. She was complaining of increasing amount of swelling of the  lower extremities with progressive weakness and increasing abdominal  fluid. The patient apparently went to [facility]  yesterday.   At that time, she apparently did have what appeared to be  progressive end-stage renal disease and does follow up with [facility], Dr. Hossein Bird. The patient apparently saw Dr. Moses Lynne, vascular  surgeon at [facility], on 02/27/2023. A fistula was placed at the  time on 02/10/2023. Fistula is not maturing at this time. The patient  is scheduled to have additional surgery on 04/06/2023. Apparently, the  patient, because of the symptoms of distended abdomen and increasing  abdominal girth, was transferred to [facility] where she was  met under the service of Dr. Janae Ojeda and Dr. Kerwin Vo. The patient was seen  at this time. Exam at this time reveals a 78-year-old white female. From a cardiac standpoint of view, the patient currently denies any  chest pain. She does have increasing swelling of her lower extremities  along with exertional dyspnea. Some of this is a component of an  underlying volume overload. She does have a history of hypertension and  also of anemia. Respiratory wise, the patient denies any significant  smoking, history of productive cough. Gastrointestinal wise, the  patient denies any significant use of alcohol but does have evidence of  intraabdominal ascites. At that time, retroperitoneal ultrasound did  show a large amount of ascites present and there was evidence of anemia. Genitourinary wise, she does have a low urine output. She denies  neurologically any stroke, TIAs, or abnormalities, although she does  appear to be very weak and fatigued.     LABS[de-identified]  Lab Results   Component Value Date    WBC 3.6 (L) 03/17/2023    HGB 7.7 (L) 03/17/2023    HCT 23.7 (L) 03/17/2023    PLT 58 (L) 03/17/2023     03/17/2023    K 3.9 03/17/2023     03/17/2023    CREATININE 3.2 (H) 03/17/2023    BUN 27 (H) 03/17/2023    CO2 27 03/17/2023    GLUCOSE 84 03/17/2023    ALT 11 03/17/2023    AST 30 03/17/2023    INR 1.3 03/15/2023     Lab Results   Component Value Date    INR 1.3 03/15/2023    INR 1.2 02/10/2023    INR 1.2 11/10/2022    PROTIME 14.5 (H) 03/15/2023    PROTIME 13.4 (H) 02/10/2023    PROTIME 12.9 (H) 11/10/2022      Lab Results   Component Value Date    TSH 11.870 (H) 03/09/2023     Lab Results   Component Value Date    TRIG 104 03/09/2023     Lab Results   Component Value Date    HDL 48 03/09/2023     Lab Results   Component Value Date    LDLCALC 81 03/09/2023     Lab Results   Component Value Date    LABA1C 4.2 03/09/2023       IMAGING:  IR FLUORO GUIDED CVA DEVICE PLMT/REPLACE/REMOVAL    Result Date: 3/15/2023  PROCEDURE: IR FLUOROSCOPY GUIDED CENTRAL VENOUS ACCESS DEVICE PLACEMENT; US GUIDED VASCULAR ACCESS MODERATE CONSCIOUS SEDATION 3/15/2023 HISTORY: ORDERING SYSTEM PROVIDED HISTORY: Placement of internal jugular tunneled dialysis catheter to initiate hemodialysis TECHNOLOGIST PROVIDED HISTORY: Reason for exam:->Placement of internal jugular tunneled dialysis catheter to initiate hemodialysis TECHNIQUE: Fluoroscopic guided ultrasound-guided CONTRAST: None SEDATION: Moderate sedation was ordered and supervised by the attending with physician face-to-face monitoring. Medications were provided and recorded by Radiology nurses. FLUOROSCOPY DOSE AND TYPE: Radiation Exposure Index: Fluoroscopy time equals 1.3 minutes. Total dose equals 2.9 mGy DESCRIPTION OF PROCEDURE: Informed consent was obtained after a detailed explanation of the procedure including risks, benefits, and alternatives. Universal protocol was observed. Sterile gowns, masks, hats and gloves utilized for maximal sterile barrier. FINDINGS: The patient's neck was prepped and draped in a sterile fashion using maximum sterile barrier technique. Ultrasound images demonstrate a patent right internal jugular vein. Following the uneventful administration of lidocaine using ultrasound guidance I introduced a micro puncture needle into the right internal jugular vein. Through the micro needle a microwire was advanced.   Over the microwire a micro sheath was placed. Through the micro sheath an Amplatz wire was advanced into the superior vena cava. 2 dilators were used to dilate a tract into the right internal jugular vein. I then anesthetized a tract along the chest wall measuring 10 cm. Using a blunt tunneling device I tunneled the catheter to the dilator within the right internal jugular vein. Over the Amplatz wire peel-away sheath was placed. Through the peel-away sheath the dual lumen 15 Yi 28 cm dialysis catheter was placed. The catheter tip is positioned at the SVC right atrial junction. The patient tolerated the procedure well and there were no complications. The catheter was then secured to the skin with 2-0 silk suture. The incision overlying the right internal jugular vein was also sutured with 2-0 silk suture. Successful placement of a tunneled dialysis catheter via the right internal jugular vein. Administration of conscious sedation. US LIVER    Result Date: 3/10/2023  EXAMINATION: RIGHT UPPER QUADRANT ULTRASOUND 3/10/2023 1:25 pm COMPARISON: None. HISTORY: ORDERING SYSTEM PROVIDED HISTORY: Cirrhosis/rule out mass TECHNOLOGIST PROVIDED HISTORY: Reason for exam:->Cirrhosis/rule out mass What reading provider will be dictating this exam?->CRC FINDINGS: LIVER:  Cirrhotic liver. BILIARY SYSTEM:  Borderline gallbladder wall thickening 0.4 cm, which could relate to chronic liver disease. No shadowing gallstone. Negative sonographic Munson sign. Common bile duct is within normal limits measuring 0.7 cm. RIGHT KIDNEY: The right kidney is grossly unremarkable without evidence of hydronephrosis. PANCREAS:  Pancreas not well visualized on this study. OTHER: Moderate perihepatic ascites. Portal vein patent. Hepatic artery patent. Patent hepatic veins. Cirrhotic liver. Ascites.      IR US GUIDED PARACENTESIS    Result Date: 3/14/2023  PROCEDURE: PARACENTESIS WITHOUT IMAGE GUIDANCE US ABDOMEN LIMITED 3/14/2023 HISTORY: ORDERING SYSTEM PROVIDED HISTORY: paracentesis if indicated with albumin TECHNOLOGIST PROVIDED HISTORY: Reason for exam:->paracentesis if indicated with albumin TECHNIQUE: Informed consent was obtained after a detailed explanation of the procedure including risks, benefits, and alternatives. Universal protocol was followed. A limited ultrasound of the abdomen was performed. The right abdomen was prepped and draped in sterile fashion and local anesthesia was achieved with lidocaine. An 8 Ivorian needle sheath was advanced into ascites and paracentesis was performed. The patient tolerated the procedure well. FINDINGS: Limited ultrasound of the abdomen demonstrates ascites. A total of 3004 in 50 cc was removed. Successful paracentesis. US DUP ABD PEL RETRO SCROT LIMITED    Result Date: 3/10/2023  EXAMINATION: DOPPLER EVALUATION OF THE PELVIS 3/10/2023 1:26 pm COMPARISON: None. HISTORY: ORDERING SYSTEM PROVIDED HISTORY: Portal hepatic veins/cirrhosis TECHNOLOGIST PROVIDED HISTORY: Reason for exam:->Portal hepatic veins/cirrhosis What reading provider will be dictating this exam?->CRC FINDINGS: Cirrhotic liver. Moderate perihepatic ascites. Borderline gallbladder wall thickening up to 0.4 cm, which could relate to chronic liver disease. No shadowing gallstone. Negative sonographic Munson's sign. No significant bile duct dilation with the common duct is 0.7 cm. Pancreas not well visualized on this study. Right kidney grossly unremarkable. Patent portal vein, main hepatic artery, and hepatic veins. Cirrhotic liver. Ascites. US GUIDED PARACENTESIS    Result Date: 3/10/2023  PROCEDURE: PARACENTESIS WITHOUT IMAGE GUIDANCE US ABDOMEN LIMITED 3/9/2023 HISTORY: ORDERING SYSTEM PROVIDED HISTORY: ascites TECHNOLOGIST PROVIDED HISTORY: Reason for exam:->ascites TECHNIQUE: Informed consent was obtained after a detailed explanation of the procedure including risks, benefits, and alternatives.   Universal protocol was followed. A limited ultrasound of the abdomen was performed. The right abdomen was prepped and draped in sterile fashion and local anesthesia was achieved with lidocaine. An 8 Georgian needle sheath was advanced into ascites and paracentesis was performed. The patient tolerated the procedure well. FINDINGS: Limited ultrasound of the abdomen demonstrates ascites. A total of 7150 cc was removed. Successful paracentesis. US RETROPERITONEAL COMPLETE    Result Date: 3/8/2023  EXAMINATION: RETROPERITONEAL ULTRASOUND OF THE KIDNEYS AND URINARY BLADDER 3/8/2023 COMPARISON: None HISTORY: ORDERING SYSTEM PROVIDED HISTORY: MARY TECHNOLOGIST PROVIDED HISTORY: Reason for exam:->MARY What reading provider will be dictating this exam?->CRC FINDINGS: Kidneys: The right kidney measures 10.7 cm in length and the left kidney measures 11.7 cm  in length. Bilateral hyperechoic kidneys. No evidence of hydronephrosis or intrarenal stones. Multiple bilateral renal cysts with the largest measuring 2.7 cm involving the mid zone of the left kidney. Bladder: Wilkerson catheter in a decompressed urinary bladder. MISCELLANEOUS: Large volume ascites     Multiple bilateral renal cysts with the largest measuring 2.7 cm. Large volume ascites. Bilateral hyperechoic kidneys. IR ULTRASOUND GUIDANCE VASCULAR ACCESS    Result Date: 3/15/2023  PROCEDURE: IR FLUOROSCOPY GUIDED CENTRAL VENOUS ACCESS DEVICE PLACEMENT; US GUIDED VASCULAR ACCESS MODERATE CONSCIOUS SEDATION 3/15/2023 HISTORY: ORDERING SYSTEM PROVIDED HISTORY: Placement of internal jugular tunneled dialysis catheter to initiate hemodialysis TECHNOLOGIST PROVIDED HISTORY: Reason for exam:->Placement of internal jugular tunneled dialysis catheter to initiate hemodialysis TECHNIQUE: Fluoroscopic guided ultrasound-guided CONTRAST: None SEDATION: Moderate sedation was ordered and supervised by the attending with physician face-to-face monitoring.  Medications were provided and recorded by Radiology nurses. FLUOROSCOPY DOSE AND TYPE: Radiation Exposure Index: Fluoroscopy time equals 1.3 minutes. Total dose equals 2.9 mGy DESCRIPTION OF PROCEDURE: Informed consent was obtained after a detailed explanation of the procedure including risks, benefits, and alternatives. Universal protocol was observed. Sterile gowns, masks, hats and gloves utilized for maximal sterile barrier. FINDINGS: The patient's neck was prepped and draped in a sterile fashion using maximum sterile barrier technique. Ultrasound images demonstrate a patent right internal jugular vein. Following the uneventful administration of lidocaine using ultrasound guidance I introduced a micro puncture needle into the right internal jugular vein. Through the micro needle a microwire was advanced. Over the microwire a micro sheath was placed. Through the micro sheath an Amplatz wire was advanced into the superior vena cava. 2 dilators were used to dilate a tract into the right internal jugular vein. I then anesthetized a tract along the chest wall measuring 10 cm. Using a blunt tunneling device I tunneled the catheter to the dilator within the right internal jugular vein. Over the Amplatz wire peel-away sheath was placed. Through the peel-away sheath the dual lumen 15 Turkish 28 cm dialysis catheter was placed. The catheter tip is positioned at the SVC right atrial junction. The patient tolerated the procedure well and there were no complications. The catheter was then secured to the skin with 2-0 silk suture. The incision overlying the right internal jugular vein was also sutured with 2-0 silk suture. Successful placement of a tunneled dialysis catheter via the right internal jugular vein. Administration of conscious sedation. HOSPITAL COURSE:   Jasmyne Mejia spent an extended period of time hospitalized and this will serve as a brief synopsis.   He was admitted March 8 due in part to progressive weakness and anasarca with multiple underlying comorbidities contributing. She was placed on a Bumex infusion and was managed with the assistance of the nephrology team.  After adequate response she was transition to high dose pulsed albumin dosing every 8 hours with albumin being utilized as needed. Despite this the patient's volume status remained markedly abnormal with quite elevated creatinine and progression to end-stage renal failure. The decision was made to initiate renal replacement therapy. Tunneled dialysis catheter was placed March 15 by IR and the patient was received 3 inpatient dialysis treatments. She will be set up for dialysis as an outpatient and will follow-up with vascular as she has an immature dialysis graft in the upper extremity. Is also noted to have anemia and required transfusions of blood and iron. GI provided consultation and, as a stool for occult blood was positive, the patient had EGD which was entirely normal.  Blood counts remained stable on PPI therapy. She will be continued on iron replacement and PPI therapy. She will follow closely with the GI team for consideration of colonoscopy as an outpatient. She was identified as having decompensated cirrhosis with likely component of hepatorenal syndrome. She had paracentesis x2 during her hospitalization with moderate to high volume removal.  Volume status will be monitored closely as an outpatient with consideration of repeat procedure if fluid cannot be entirely removed with dialysis. Newly identified hypothyroidism as well with TSH of 11.870. She was initiated on Synthroid therapy while an inpatient and will need repeat TSH and free T4 as an outpatient in 6 to 8 weeks. *Min was implemented for pruritus secondary to liver disease. Her chronic morbidities, labs and vital signs were monitored and addressed accordingly. Patient is medically stable and acceptable for discharge today.     BRIEF PHYSICAL EXAMINATION AND LABORATORIES ON DAY OF DISCHARGE:  VITALS: BP (!) 114/44   Pulse 79   Temp 98.9 °F (37.2 °C)   Resp 18   Ht 5' 2\" (1.575 m)   Wt 160 lb 4.4 oz (72.7 kg)   SpO2 95%   BMI 29.31 kg/m²     HEENT:  PERRLA. EOMI. Sclera clear. Buccal mucosa moist.    Neck:  Supple. Trachea midline. No thyromegaly. No JVD. No bruits. Heart:  Rhythm regular, rate controlled. S1 and S2. Systolic murmur. Lungs:  Symmetrical.  Diminished. Clear to auscultation bilaterally. No wheezes. No rhonchi. No rales. Abdomen: Soft. Non-tender. No significant distention appreciated on examination today. Bowel sounds positive. No organomegaly or masses. No pain on palpation    Extremities:  Peripheral pulses present. There is significant interval improvement in degree of peripheral edema she is now trace. No ulcers. Neurologic:  Alert x 3. No focal deficit. Cranial nerves grossly intact. Skin: Tunneled dialysis catheter in place in the right upper chest.  No petechia. No hemorrhage. No wounds. DISPOSITION:  The patient's condition is stable. At this time the patient is without objective evidence of an acute process requiring continuing hospitalization or inpatient management. They are stable for discharge with outpatient follow-up. I have spoken with the patient and discussed the results of the current hospitalization, in addition to providing specific details for the plan of care and counseling regarding the diagnosis and prognosis. The plan has been discussed in detail and they are aware of the specific conditions for emergent return, as well as the importance of follow-up.   Their questions are answered at this time and they are agreeable with the plan for discharge to nursing home    DISCHARGE MEDICATIONS:   Current Discharge Medication List             Details   cholestyramine (QUESTRAN) 4 g packet Take 1 packet by mouth 2 times daily  Qty: 90 packet, Refills: 3      colchicine (COLCRYS) 0.6 MG tablet Take 1 tablet by mouth every other day  Qty: 30 tablet, Refills: 3      folic acid (FOLVITE) 1 MG tablet Take 1 tablet by mouth daily  Qty: 30 tablet, Refills: 3      polyethylene glycol (GLYCOLAX) 17 g packet Take 17 g by mouth daily  Qty: 527 g, Refills: 1      levothyroxine (SYNTHROID) 100 MCG tablet Take 1 tablet by mouth Daily  Qty: 30 tablet, Refills: 3      Ergocalciferol (VITAMIN D) 42077 units CAPS Take 50,000 Units by mouth once a week  Qty: 5 capsule                Details   amLODIPine (NORVASC) 5 MG tablet Take 1 tablet by mouth daily  Qty: 30 tablet, Refills: 3                Details   FLUoxetine (PROZAC) 20 MG capsule Take 20 mg by mouth daily      omeprazole (PRILOSEC) 20 MG delayed release capsule Take 20 mg by mouth daily      OLANZapine (ZYPREXA) 2.5 MG tablet Take 2.5 mg by mouth nightly      vitamin B-12 (CYANOCOBALAMIN) 1000 MCG tablet Take 1,000 mcg by mouth daily      ferrous sulfate (IRON 325) 325 (65 Fe) MG tablet Take 325 mg by mouth three times a week Bjidmi-Ejeaerfha-Ucpgcy      sodium bicarbonate 325 MG tablet Take 650 mg by mouth 2 times daily      zinc gluconate 50 MG tablet Take 50 mg by mouth daily             FOLLOW UP/INSTRUCTIONS:  This patient is instructed to follow-up with her primary care physician. Patient is instructed to follow-up with the consults listed above as directed by them. she is instructed to resume home medications and take new medications as indicated in the list above. If the patient has a recurrence of symptoms, she is instructed to go to the ED. Preparing for this patient's discharge, including paperwork, orders, instructions, and meeting with patient did require > 40 minutes.     QUINTEN Ghotra CNP     3/17/2023  8:53 AM

## 2023-03-17 NOTE — PROGRESS NOTES
Report called to Sid Martinez RN at [facility]. Son present at discharge. Discharged via ambulette.

## 2023-03-17 NOTE — FLOWSHEET NOTE
03/17/23 1300   Vital Signs   /61   Temp 98.1 °F (36.7 °C)   Heart Rate 81   Resp (!) 0   Weight 156 lb 4.9 oz (70.9 kg)   Weight Method Bed scale   Percent Weight Change -2.07   Post-Hemodialysis Assessment   Post-Treatment Procedures Blood returned;Catheter capped, clamped with Citrate x 2 ports   Machine Disinfection Process Acid/Vinegar Clean;Bleach; Exterior Machine Disinfection;Verified Absence of Bleach in HCO3 Jug   Rinseback Volume (ml) 300 ml   Blood Volume Processed (Liters) 76.2 l/min   Dialyzer Clearance Lightly streaked   Duration of Treatment (minutes) 240 minutes   Hemodialysis Intake (ml) 300 ml   Hemodialysis Output (ml) 1800 ml   NET Removed (ml) 1500   Tolerated Treatment Good   Patient Response to Treatment tolerated tx well. removed 1.5L   Time Off 1300   Patient Disposition Return to room

## 2023-03-17 NOTE — CARE COORDINATION
Ss note: 3/17/005732:16 PM neg rapid covid today. Discharge order noted. Chair time arranged with Estella in [place] for [facility] Sat at 11:40 am. Per Dr. Cale Cronin pt ok to discharge to [facility] with Hep Panel pending. Spoke with son in room he has outpt HD schedule letter. Per sujey Hernandez facility can transport via wheelchair today at 2:30 pm, nursing aware. Hens completed.  NAVJOT Murphy

## 2023-03-18 LAB
BACTERIA FLD AEROBE CULT: NORMAL
GRAM STN SPEC: NORMAL

## 2023-03-20 ENCOUNTER — TELEPHONE (OUTPATIENT)
Dept: VASCULAR SURGERY | Age: 73
End: 2023-03-20

## 2023-03-20 NOTE — TELEPHONE ENCOUNTER
Oliver from [facility] called. Pt has started dialysis, rescheduled 4/4 AVF revision to 4/28.

## 2023-03-21 ENCOUNTER — HOSPITAL ENCOUNTER (EMERGENCY)
Age: 73
Discharge: HOME | End: 2023-03-22
Attending: EMERGENCY MEDICINE
Payer: COMMERCIAL

## 2023-03-21 DIAGNOSIS — D64.9 ANEMIA, UNSPECIFIED TYPE: Primary | ICD-10-CM

## 2023-03-21 LAB
ABO + RH BLD: NORMAL
BLD GP AB SCN SERPL QL: NORMAL
HAV IGM SERPL QL IA: NORMAL
HBV CORE IGM SERPL QL IA: NORMAL
HBV SURFACE AG SERPL QL IA: NORMAL
HCV AB SERPL QL IA: NORMAL

## 2023-03-21 PROCEDURE — P9016 RBC LEUKOCYTES REDUCED: HCPCS

## 2023-03-21 PROCEDURE — 86923 COMPATIBILITY TEST ELECTRIC: CPT

## 2023-03-21 PROCEDURE — 86900 BLOOD TYPING SEROLOGIC ABO: CPT

## 2023-03-21 PROCEDURE — 93005 ELECTROCARDIOGRAM TRACING: CPT

## 2023-03-21 PROCEDURE — 86901 BLOOD TYPING SEROLOGIC RH(D): CPT

## 2023-03-21 PROCEDURE — 85025 COMPLETE CBC W/AUTO DIFF WBC: CPT

## 2023-03-21 PROCEDURE — 80053 COMPREHEN METABOLIC PANEL: CPT

## 2023-03-21 PROCEDURE — 86850 RBC ANTIBODY SCREEN: CPT

## 2023-03-21 PROCEDURE — 99285 EMERGENCY DEPT VISIT HI MDM: CPT

## 2023-03-21 RX ORDER — SODIUM CHLORIDE 9 MG/ML
INJECTION, SOLUTION INTRAVENOUS PRN
Status: DISCONTINUED | OUTPATIENT
Start: 2023-03-21 | End: 2023-03-22 | Stop reason: HOSPADM

## 2023-03-21 ASSESSMENT — LIFESTYLE VARIABLES
HOW OFTEN DO YOU HAVE A DRINK CONTAINING ALCOHOL: NEVER
HOW MANY STANDARD DRINKS CONTAINING ALCOHOL DO YOU HAVE ON A TYPICAL DAY: PATIENT DOES NOT DRINK

## 2023-03-21 ASSESSMENT — PAIN - FUNCTIONAL ASSESSMENT: PAIN_FUNCTIONAL_ASSESSMENT: NONE - DENIES PAIN

## 2023-03-22 VITALS
TEMPERATURE: 98.2 F | DIASTOLIC BLOOD PRESSURE: 66 MMHG | BODY MASS INDEX: 29.44 KG/M2 | HEIGHT: 62 IN | OXYGEN SATURATION: 94 % | WEIGHT: 160 LBS | RESPIRATION RATE: 18 BRPM | HEART RATE: 84 BPM | SYSTOLIC BLOOD PRESSURE: 146 MMHG

## 2023-03-22 LAB
ALBUMIN SERPL-MCNC: 2.7 G/DL (ref 3.5–5.2)
ALP SERPL-CCNC: 226 U/L (ref 35–104)
ALT SERPL-CCNC: 13 U/L (ref 0–32)
ANION GAP SERPL CALCULATED.3IONS-SCNC: 8 MMOL/L (ref 7–16)
ANISOCYTOSIS: ABNORMAL
AST SERPL-CCNC: 37 U/L (ref 0–31)
BASOPHILS # BLD: 0.07 E9/L (ref 0–0.2)
BASOPHILS NFR BLD: 1.8 % (ref 0–2)
BILIRUB SERPL-MCNC: 0.4 MG/DL (ref 0–1.2)
BLOOD BANK DISPENSE STATUS: NORMAL
BLOOD BANK PRODUCT CODE: NORMAL
BPU ID: NORMAL
BUN SERPL-MCNC: 38 MG/DL (ref 6–23)
CALCIUM SERPL-MCNC: 7.4 MG/DL (ref 8.6–10.2)
CHLORIDE SERPL-SCNC: 105 MMOL/L (ref 98–107)
CO2 SERPL-SCNC: 25 MMOL/L (ref 22–29)
CREAT SERPL-MCNC: 4.4 MG/DL (ref 0.5–1)
DESCRIPTION BLOOD BANK: NORMAL
EOSINOPHIL # BLD: 0 E9/L (ref 0.05–0.5)
EOSINOPHIL NFR BLD: 2.5 % (ref 0–6)
ERYTHROCYTE [DISTWIDTH] IN BLOOD BY AUTOMATED COUNT: 14.4 FL (ref 11.5–15)
GLUCOSE SERPL-MCNC: 103 MG/DL (ref 74–99)
HCT VFR BLD AUTO: 23.1 % (ref 34–48)
HCT VFR BLD AUTO: 25.6 % (ref 34–48)
HGB BLD-MCNC: 6.8 G/DL (ref 11.5–15.5)
HGB BLD-MCNC: 7.8 G/DL (ref 11.5–15.5)
HYPOCHROMIA: ABNORMAL
LYMPHOCYTES # BLD: 0.26 E9/L (ref 1.5–4)
LYMPHOCYTES NFR BLD: 7.1 % (ref 20–42)
MCH RBC QN AUTO: 28.1 PG (ref 26–35)
MCHC RBC AUTO-ENTMCNC: 29.4 % (ref 32–34.5)
MCV RBC AUTO: 95.5 FL (ref 80–99.9)
MONOCYTES # BLD: 0.18 E9/L (ref 0.1–0.95)
MONOCYTES NFR BLD: 5.3 % (ref 2–12)
NEUTROPHILS # BLD: 3.18 E9/L (ref 1.8–7.3)
NEUTS SEG NFR BLD: 85.8 % (ref 43–80)
OVALOCYTES: ABNORMAL
PLATELET # BLD AUTO: 89 E9/L (ref 130–450)
PLATELET CONFIRMATION: NORMAL
PMV BLD AUTO: 12 FL (ref 7–12)
POIKILOCYTES: ABNORMAL
POLYCHROMASIA: ABNORMAL
POTASSIUM SERPL-SCNC: 4.3 MMOL/L (ref 3.5–5)
PROT SERPL-MCNC: 5.4 G/DL (ref 6.4–8.3)
RBC # BLD AUTO: 2.42 E12/L (ref 3.5–5.5)
SCHISTOCYTES: ABNORMAL
SODIUM SERPL-SCNC: 138 MMOL/L (ref 132–146)
TARGET CELLS: ABNORMAL
WBC # BLD: 3.7 E9/L (ref 4.5–11.5)

## 2023-03-22 PROCEDURE — 85018 HEMOGLOBIN: CPT

## 2023-03-22 PROCEDURE — 85014 HEMATOCRIT: CPT

## 2023-03-22 ASSESSMENT — PAIN - FUNCTIONAL ASSESSMENT: PAIN_FUNCTIONAL_ASSESSMENT: NONE - DENIES PAIN

## 2023-03-22 NOTE — ED PROVIDER NOTES
dictation. EKG:  EKG: This EKG is signed by emergency department physician. Rate: 76  Rhythm: normal Sinus  Interpretation: no acute changes, no ST or T wave abnormalities   Comparison: stable as compared to patient's most recent EKG           PROCEDURES   Unless otherwise noted below, none     Procedures      PAST MEDICAL HISTORY      has a past medical history of Anemia, Hypertension, and Kidney failure. EMERGENCY DEPARTMENT COURSE and DIFFERENTIAL DIAGNOSIS/MDM:   Vitals:    Vitals:    03/22/23 0315 03/22/23 0330 03/22/23 0345 03/22/23 0702   BP: (!) 124/55 (!) 128/56 (!) 125/58 (!) 146/66   Pulse: 73 72 72 84   Resp:       Temp:       TempSrc:       SpO2: 95% 95% 95% 94%   Weight:       Height:                     CC/HPI Summary, Stable/unstable, exam findings  Allie Orosco is a 74 y.o. female who presents from nursing home after they reported a hemoglobin level of 6.8 earlier today. She denies any black or bloody stool. On arrival patient appears in no acute distress. Vitals are within normal limits. Exam revealed some mild pallor otherwise no acute findings. Good aeration bilateral lungs and normal heart sounds. DDX: Anemia of chronic disease, lab error, GI bleed, ESRD    ED course, reevaluations, disposition   Medications - No data to display  Labs were ordered indicating a hemoglobin level of 6.8. WBC of 3.7 and normal electrolytes. Patient was given 1 unit of blood. Repeat H&H indicated a hemoglobin level of 7.8. Patient was discharged after multiple reevaluations. Patient remained stable throughout her time in the emergency room. Precautions were discussed prior to discharge.     CONSULTS: (Who and What was discussed)  None      Social Determinants : None      Records Reviewed (source and summary): None    Disposition Considerations (include 1 Tests not done, Admit vs D/C, Shared Decision Making, Pt Expectation of Test or Tx.): Patient was not given fresh frozen plasma or platelets

## 2023-03-22 NOTE — DISCHARGE INSTRUCTIONS
Your evaluation did not show evidence of medical conditions requiring emergent intervention at this time. Please schedule an appointment with your primary care physician. Return to the Emergency Department if you experience worsening or uncontrolled pain, fevers 100.4°F or greater, recurrent vomiting, inability to tolerate food or fluids by mouth, bloody stools or vomit, chest pain, difficulty breathing or any other concerning symptoms. Thank you for choosing us for your care.

## 2023-03-22 NOTE — ED NOTES
Blood consent form signed by patient and witnessed by this RN.       Whitley Bella RN  03/21/23 4116

## 2023-03-23 LAB
EKG ATRIAL RATE: 76 BPM
EKG P AXIS: 38 DEGREES
EKG P-R INTERVAL: 160 MS
EKG Q-T INTERVAL: 418 MS
EKG QRS DURATION: 80 MS
EKG QTC CALCULATION (BAZETT): 470 MS
EKG R AXIS: 8 DEGREES
EKG T AXIS: 36 DEGREES
EKG VENTRICULAR RATE: 76 BPM

## 2023-03-23 PROCEDURE — 93010 ELECTROCARDIOGRAM REPORT: CPT | Performed by: INTERNAL MEDICINE

## 2023-04-13 ENCOUNTER — TELEPHONE (OUTPATIENT)
Dept: VASCULAR SURGERY | Age: 73
End: 2023-04-13

## 2023-04-24 RX ORDER — VIT B COMP NO.3/FOLIC/C/BIOTIN 1 MG-60 MG
1 TABLET ORAL EVERY EVENING
COMMUNITY

## 2023-04-24 NOTE — PROGRESS NOTES
[address]   PRE-ADMISSION TESTING GENERAL INSTRUCTIONS  PAT Phone Number: [phone]      GENERAL INSTRUCTIONS:    [x] Antibacterial Soap Shower Night before and/or AM of surgery. [x] Do not wear makeup, lotions, powders, deodorant. [x] Nothing by mouth after midnight; including gum, candy, mints, or water. [x] You may brush your teeth, gargle, but do NOT swallow water. [x] No tobacco products, illegal drugs, or alcohol within 24 hours of your surgery. [x] Jewelry or valuables should not be brought to the hospital. All body and/or tongue piercing's must be removed prior to arriving to hospital. No contact lens or hair pins. [x] Arrange transportation with a responsible adult  to and from the hospital. Arrange for someone to be with you for the remainder of the day and for 24 hours after your procedure due to having had anesthesia. -Who will be your  for transportation?__facility to transport__   [x] Bring insurance card and photo ID. PARKING INSTRUCTIONS:     [x] ARRIVAL DATE & TIME: 4/28 @ 0530am  [x] Times are subject to change. We will contact you the day before surgery if that were to occur. [x] Enter into the [email or web address]. Two people may accompany you. Masks are not required but are recommended. [x] Parking Lot \"I\" is where you will park. It is located on the corner of [facility] and [facility]. The entrance is on [facility]. Upon entering the parking lot, a voucher ticket will print    MEDICATION INSTRUCTIONS:    [x] Bring a complete list of your medications, please write the last time you took the medicine, give this list to the nurse in Pre-Op. [x] Take only the following medications the morning of surgery with 1-2 ounces of water: amlodipine, omeprazole  [x] Stop all herbal supplements and vitamins 5 days before surgery. Stop NSAIDS 7 days before surgery.  Last dose of Vitamins 4/24  [x] Follow physician instructions

## 2023-04-26 ENCOUNTER — ANESTHESIA EVENT (OUTPATIENT)
Dept: OPERATING ROOM | Age: 73
End: 2023-04-26
Payer: COMMERCIAL

## 2023-04-28 ENCOUNTER — HOSPITAL ENCOUNTER (OUTPATIENT)
Age: 73
Setting detail: OUTPATIENT SURGERY
Discharge: OTHER INSTITUTION | End: 2023-04-28
Attending: SURGERY | Admitting: SURGERY
Payer: COMMERCIAL

## 2023-04-28 ENCOUNTER — ANESTHESIA (OUTPATIENT)
Dept: OPERATING ROOM | Age: 73
End: 2023-04-28
Payer: COMMERCIAL

## 2023-04-28 VITALS
OXYGEN SATURATION: 96 % | DIASTOLIC BLOOD PRESSURE: 58 MMHG | RESPIRATION RATE: 18 BRPM | HEART RATE: 70 BPM | SYSTOLIC BLOOD PRESSURE: 109 MMHG | WEIGHT: 160 LBS | BODY MASS INDEX: 29.44 KG/M2 | TEMPERATURE: 97.4 F | HEIGHT: 62 IN

## 2023-04-28 DIAGNOSIS — Z99.2 ENCOUNTER REGARDING VASCULAR ACCESS FOR DIALYSIS FOR END-STAGE RENAL DISEASE (HCC): Primary | ICD-10-CM

## 2023-04-28 DIAGNOSIS — N18.6 ENCOUNTER REGARDING VASCULAR ACCESS FOR DIALYSIS FOR END-STAGE RENAL DISEASE (HCC): Primary | ICD-10-CM

## 2023-04-28 LAB
ABO + RH BLD: NORMAL
ANION GAP SERPL CALCULATED.3IONS-SCNC: 9 MMOL/L (ref 7–16)
BLD GP AB SCN SERPL QL: NORMAL
BUN SERPL-MCNC: 16 MG/DL (ref 6–23)
CALCIUM SERPL-MCNC: 8.3 MG/DL (ref 8.6–10.2)
CHLORIDE SERPL-SCNC: 98 MMOL/L (ref 98–107)
CO2 SERPL-SCNC: 31 MMOL/L (ref 22–29)
CREAT SERPL-MCNC: 2.4 MG/DL (ref 0.5–1)
ERYTHROCYTE [DISTWIDTH] IN BLOOD BY AUTOMATED COUNT: 14.9 FL (ref 11.5–15)
GLUCOSE SERPL-MCNC: 97 MG/DL (ref 74–99)
HCT VFR BLD AUTO: 29.9 % (ref 34–48)
HGB BLD-MCNC: 9.2 G/DL (ref 11.5–15.5)
INR BLD: 1.3
MCH RBC QN AUTO: 28.2 PG (ref 26–35)
MCHC RBC AUTO-ENTMCNC: 30.8 % (ref 32–34.5)
MCV RBC AUTO: 91.7 FL (ref 80–99.9)
PLATELET # BLD AUTO: 93 E9/L (ref 130–450)
PLATELET CONFIRMATION: NORMAL
PMV BLD AUTO: 11.2 FL (ref 7–12)
POTASSIUM SERPL-SCNC: 3.7 MMOL/L (ref 3.5–5)
PROTHROMBIN TIME: 14.6 SEC (ref 9.3–12.4)
RBC # BLD AUTO: 3.26 E12/L (ref 3.5–5.5)
SODIUM SERPL-SCNC: 138 MMOL/L (ref 132–146)
WBC # BLD: 4.5 E9/L (ref 4.5–11.5)

## 2023-04-28 PROCEDURE — 6360000002 HC RX W HCPCS: Performed by: SURGERY

## 2023-04-28 PROCEDURE — 2580000003 HC RX 258: Performed by: NURSE ANESTHETIST, CERTIFIED REGISTERED

## 2023-04-28 PROCEDURE — 6360000002 HC RX W HCPCS: Performed by: ANESTHESIOLOGY

## 2023-04-28 PROCEDURE — 36415 COLL VENOUS BLD VENIPUNCTURE: CPT

## 2023-04-28 PROCEDURE — 85027 COMPLETE CBC AUTOMATED: CPT

## 2023-04-28 PROCEDURE — 2580000003 HC RX 258: Performed by: SURGERY

## 2023-04-28 PROCEDURE — 86850 RBC ANTIBODY SCREEN: CPT

## 2023-04-28 PROCEDURE — 80048 BASIC METABOLIC PNL TOTAL CA: CPT

## 2023-04-28 PROCEDURE — 3700000001 HC ADD 15 MINUTES (ANESTHESIA): Performed by: SURGERY

## 2023-04-28 PROCEDURE — 6360000002 HC RX W HCPCS: Performed by: NURSE ANESTHETIST, CERTIFIED REGISTERED

## 2023-04-28 PROCEDURE — 3600000012 HC SURGERY LEVEL 2 ADDTL 15MIN: Performed by: SURGERY

## 2023-04-28 PROCEDURE — 3700000000 HC ANESTHESIA ATTENDED CARE: Performed by: SURGERY

## 2023-04-28 PROCEDURE — 86901 BLOOD TYPING SEROLOGIC RH(D): CPT

## 2023-04-28 PROCEDURE — 2500000003 HC RX 250 WO HCPCS

## 2023-04-28 PROCEDURE — 64415 NJX AA&/STRD BRCH PLXS IMG: CPT | Performed by: ANESTHESIOLOGY

## 2023-04-28 PROCEDURE — A4217 STERILE WATER/SALINE, 500 ML: HCPCS | Performed by: SURGERY

## 2023-04-28 PROCEDURE — 36832 AV FISTULA REVISION OPEN: CPT | Performed by: SURGERY

## 2023-04-28 PROCEDURE — 6360000002 HC RX W HCPCS: Performed by: PHYSICIAN ASSISTANT

## 2023-04-28 PROCEDURE — 85610 PROTHROMBIN TIME: CPT

## 2023-04-28 PROCEDURE — 3600000002 HC SURGERY LEVEL 2 BASE: Performed by: SURGERY

## 2023-04-28 PROCEDURE — 2709999900 HC NON-CHARGEABLE SUPPLY: Performed by: SURGERY

## 2023-04-28 PROCEDURE — 7100000011 HC PHASE II RECOVERY - ADDTL 15 MIN: Performed by: SURGERY

## 2023-04-28 PROCEDURE — 7100000010 HC PHASE II RECOVERY - FIRST 15 MIN: Performed by: SURGERY

## 2023-04-28 PROCEDURE — 86900 BLOOD TYPING SEROLOGIC ABO: CPT

## 2023-04-28 PROCEDURE — 2580000003 HC RX 258: Performed by: PHYSICIAN ASSISTANT

## 2023-04-28 PROCEDURE — C1768 GRAFT, VASCULAR: HCPCS | Performed by: SURGERY

## 2023-04-28 DEVICE — GRAFT VASC L15CM ID6MM BOV CAR ART CLLGN FOR FUNC HEMO DYLS: Type: IMPLANTABLE DEVICE | Site: ARM | Status: FUNCTIONAL

## 2023-04-28 RX ORDER — ALBUTEROL SULFATE 90 UG/1
2 AEROSOL, METERED RESPIRATORY (INHALATION) EVERY 6 HOURS PRN
Status: DISCONTINUED | OUTPATIENT
Start: 2023-04-28 | End: 2023-04-28 | Stop reason: HOSPADM

## 2023-04-28 RX ORDER — SODIUM CHLORIDE 9 MG/ML
INJECTION, SOLUTION INTRAVENOUS CONTINUOUS
Status: DISCONTINUED | OUTPATIENT
Start: 2023-04-28 | End: 2023-04-28 | Stop reason: HOSPADM

## 2023-04-28 RX ORDER — ROPIVACAINE HYDROCHLORIDE 5 MG/ML
30 INJECTION, SOLUTION EPIDURAL; INFILTRATION; PERINEURAL ONCE
Status: COMPLETED | OUTPATIENT
Start: 2023-04-28 | End: 2023-04-28

## 2023-04-28 RX ORDER — SODIUM CHLORIDE 0.9 % (FLUSH) 0.9 %
5-40 SYRINGE (ML) INJECTION EVERY 12 HOURS SCHEDULED
Status: DISCONTINUED | OUTPATIENT
Start: 2023-04-28 | End: 2023-04-28 | Stop reason: HOSPADM

## 2023-04-28 RX ORDER — TRAMADOL HYDROCHLORIDE 50 MG/1
50 TABLET ORAL PRN
Status: DISCONTINUED | OUTPATIENT
Start: 2023-04-28 | End: 2023-04-28 | Stop reason: HOSPADM

## 2023-04-28 RX ORDER — SODIUM CHLORIDE 0.9 % (FLUSH) 0.9 %
5-40 SYRINGE (ML) INJECTION PRN
Status: DISCONTINUED | OUTPATIENT
Start: 2023-04-28 | End: 2023-04-28 | Stop reason: HOSPADM

## 2023-04-28 RX ORDER — FENTANYL CITRATE 50 UG/ML
25 INJECTION, SOLUTION INTRAMUSCULAR; INTRAVENOUS ONCE
Status: DISCONTINUED | OUTPATIENT
Start: 2023-04-28 | End: 2023-04-28 | Stop reason: HOSPADM

## 2023-04-28 RX ORDER — MIDAZOLAM HYDROCHLORIDE 2 MG/2ML
2 INJECTION, SOLUTION INTRAMUSCULAR; INTRAVENOUS EVERY 10 MIN PRN
Status: DISCONTINUED | OUTPATIENT
Start: 2023-04-28 | End: 2023-04-28 | Stop reason: HOSPADM

## 2023-04-28 RX ORDER — TRAMADOL HYDROCHLORIDE 50 MG/1
100 TABLET ORAL PRN
Status: DISCONTINUED | OUTPATIENT
Start: 2023-04-28 | End: 2023-04-28 | Stop reason: HOSPADM

## 2023-04-28 RX ORDER — LIDOCAINE HYDROCHLORIDE 10 MG/ML
10 INJECTION, SOLUTION INFILTRATION; PERINEURAL
Status: COMPLETED | OUTPATIENT
Start: 2023-04-28 | End: 2023-04-28

## 2023-04-28 RX ORDER — LIDOCAINE HYDROCHLORIDE 10 MG/ML
INJECTION, SOLUTION INFILTRATION; PERINEURAL
Status: COMPLETED
Start: 2023-04-28 | End: 2023-04-28

## 2023-04-28 RX ORDER — HEPARIN SODIUM 1000 [USP'U]/ML
INJECTION, SOLUTION INTRAVENOUS; SUBCUTANEOUS PRN
Status: DISCONTINUED | OUTPATIENT
Start: 2023-04-28 | End: 2023-04-28 | Stop reason: SDUPTHER

## 2023-04-28 RX ORDER — SODIUM CHLORIDE 9 MG/ML
INJECTION, SOLUTION INTRAVENOUS CONTINUOUS PRN
Status: DISCONTINUED | OUTPATIENT
Start: 2023-04-28 | End: 2023-04-28 | Stop reason: SDUPTHER

## 2023-04-28 RX ORDER — MIDAZOLAM HYDROCHLORIDE 2 MG/2ML
1 INJECTION, SOLUTION INTRAMUSCULAR; INTRAVENOUS EVERY 5 MIN PRN
Status: DISCONTINUED | OUTPATIENT
Start: 2023-04-28 | End: 2023-04-28 | Stop reason: HOSPADM

## 2023-04-28 RX ORDER — LIDOCAINE HYDROCHLORIDE 10 MG/ML
INJECTION, SOLUTION EPIDURAL; INFILTRATION; INTRACAUDAL; PERINEURAL
Status: DISCONTINUED
Start: 2023-04-28 | End: 2023-04-28 | Stop reason: HOSPADM

## 2023-04-28 RX ORDER — FENTANYL CITRATE 50 UG/ML
100 INJECTION, SOLUTION INTRAMUSCULAR; INTRAVENOUS ONCE
Status: DISCONTINUED | OUTPATIENT
Start: 2023-04-28 | End: 2023-04-28 | Stop reason: HOSPADM

## 2023-04-28 RX ORDER — ROPIVACAINE HYDROCHLORIDE 5 MG/ML
INJECTION, SOLUTION EPIDURAL; INFILTRATION; PERINEURAL
Status: COMPLETED | OUTPATIENT
Start: 2023-04-28 | End: 2023-04-28

## 2023-04-28 RX ORDER — SODIUM CHLORIDE 9 MG/ML
INJECTION, SOLUTION INTRAVENOUS PRN
Status: DISCONTINUED | OUTPATIENT
Start: 2023-04-28 | End: 2023-04-28 | Stop reason: HOSPADM

## 2023-04-28 RX ORDER — ROPIVACAINE HYDROCHLORIDE 5 MG/ML
30 INJECTION, SOLUTION EPIDURAL; INFILTRATION; PERINEURAL
Status: DISCONTINUED | OUTPATIENT
Start: 2023-04-28 | End: 2023-04-28 | Stop reason: HOSPADM

## 2023-04-28 RX ORDER — PROTAMINE SULFATE 10 MG/ML
INJECTION, SOLUTION INTRAVENOUS PRN
Status: DISCONTINUED | OUTPATIENT
Start: 2023-04-28 | End: 2023-04-28 | Stop reason: SDUPTHER

## 2023-04-28 RX ORDER — PROPOFOL 10 MG/ML
INJECTION, EMULSION INTRAVENOUS PRN
Status: DISCONTINUED | OUTPATIENT
Start: 2023-04-28 | End: 2023-04-28 | Stop reason: SDUPTHER

## 2023-04-28 RX ORDER — OXYCODONE HYDROCHLORIDE AND ACETAMINOPHEN 5; 325 MG/1; MG/1
1 TABLET ORAL EVERY 6 HOURS PRN
Qty: 28 TABLET | Refills: 0 | Status: SHIPPED | OUTPATIENT
Start: 2023-04-28 | End: 2023-05-05

## 2023-04-28 RX ADMIN — ROPIVACAINE HYDROCHLORIDE 30 ML: 5 INJECTION EPIDURAL; INFILTRATION; PERINEURAL at 07:00

## 2023-04-28 RX ADMIN — PROPOFOL 20 MG: 10 INJECTION, EMULSION INTRAVENOUS at 07:37

## 2023-04-28 RX ADMIN — SODIUM CHLORIDE: 9 INJECTION, SOLUTION INTRAVENOUS at 07:28

## 2023-04-28 RX ADMIN — HEPARIN SODIUM 3000 UNITS: 1000 INJECTION INTRAVENOUS; SUBCUTANEOUS at 08:42

## 2023-04-28 RX ADMIN — HEPARIN SODIUM 6000 UNITS: 1000 INJECTION INTRAVENOUS; SUBCUTANEOUS at 08:07

## 2023-04-28 RX ADMIN — LIDOCAINE HYDROCHLORIDE 2 ML: 10 INJECTION, SOLUTION INFILTRATION; PERINEURAL at 07:07

## 2023-04-28 RX ADMIN — ROPIVACAINE HYDROCHLORIDE 30 ML: 5 INJECTION EPIDURAL; INFILTRATION; PERINEURAL at 07:08

## 2023-04-28 RX ADMIN — MIDAZOLAM 1 MG: 1 INJECTION INTRAMUSCULAR; INTRAVENOUS at 07:02

## 2023-04-28 RX ADMIN — HEPARIN SODIUM 4000 UNITS: 1000 INJECTION INTRAVENOUS; SUBCUTANEOUS at 09:17

## 2023-04-28 RX ADMIN — PROPOFOL 25 MCG/KG/MIN: 10 INJECTION, EMULSION INTRAVENOUS at 07:38

## 2023-04-28 RX ADMIN — CEFAZOLIN 2000 MG: 2 INJECTION, POWDER, FOR SOLUTION INTRAMUSCULAR; INTRAVENOUS at 07:44

## 2023-04-28 RX ADMIN — LIDOCAINE HYDROCHLORIDE 2 ML: 10 INJECTION, SOLUTION EPIDURAL; INFILTRATION; INTRACAUDAL; PERINEURAL at 07:07

## 2023-04-28 RX ADMIN — PROTAMINE SULFATE 50 MG: 10 INJECTION, SOLUTION INTRAVENOUS at 09:39

## 2023-04-28 ASSESSMENT — PAIN SCALES - GENERAL
PAINLEVEL_OUTOF10: 0
PAINLEVEL_OUTOF10: 0

## 2023-04-28 ASSESSMENT — LIFESTYLE VARIABLES: SMOKING_STATUS: 0

## 2023-04-28 ASSESSMENT — PAIN - FUNCTIONAL ASSESSMENT: PAIN_FUNCTIONAL_ASSESSMENT: NONE - DENIES PAIN

## 2023-04-28 NOTE — DISCHARGE INSTRUCTIONS
Elevate Left upper extremity at all times  Keep left arm sling on until tomorrow. You may remove tomorrow and discard in the trash. [place] may be drowsy or lightheaded after receiving sedation or anesthesia. A responsible person should be with you for the next 24 hours. Please follow the instructions checked below:    DIET INSTRUCTIONS:  [x]Start with light diet and progress to your normal diet as you feel like eating. If you experience nausea or repeated episodes of vomiting which persist beyond 12-24 hours, notify your doctor. []Other     ACTIVITY INSTRUCTIONS:  [x]Rest today. Increase activity as tolerated    [x]Elevate operative limb   [x]No heavy lifting or strenuous activity     [x]No driving for 2 days  []Other     WOUND/DRESSING INSTRUCTIONS:  Always ensure you and your care giver clean hands before and after caring for the wound. []May shower      [x]May bathe      [x]Derma bond dressing-Do not apply lotion, gel, or liquid to wound while the derma bond is in place. []Other         MEDICATION INSTRUCTIONS:  [x]Prescriptions sent with you. Use as directed. When taking pain medications, you may experience dizziness or drowsiness. Do not drink alcohol or drive when taking these medications. [x]You may take a non-prescription headache remedy, preferably one that does not contain aspirin. FOLLOW-UP CARE:  [x]Call the office at [phone] for follow-up appointment in 3 weeks    Call physician if they or any other problems occur:  Fever over 101°    Redness, swelling, hardness or warmth at the operative site  Swelling of arm  Unrelieved nausea    Foul smelling or cloudy drainage at the operative site   Unrelieved pain    Blood soaked dressing.  (Some oozing may be normal)  Pain in Hand  Blue or black fingers

## 2023-04-28 NOTE — OP NOTE
Operative Note      Patient: Kelley Umanzor  YOB: 1950  MRN: [id number]    Date of Procedure: 4/28/2023    Pre-Op Diagnosis Codes:     * End stage renal disease ([facility] [address] [N18.6]    Post-Op Diagnosis: Same       Procedure  Revision of proximal BC AVCF cephalic vein with artegraft patch    Surgeon(s):  Candice Perez MD    Assistant:   Surgical Assistant: Hossein Mullins    Anesthesia: Regional    Estimated Blood Loss (mL): 582     Complications: None    Specimens:   * No specimens in log *    Implants:  Implant Name Type Inv. Item Serial No.  Lot No. LRB No. Used Action   GRAFT VASC L15CM ID6MM BOV CAR ART CLLGN FOR FUNC HEMO DYLS - [id number] Vascular grafts GRAFT VASC L15CM [id number] BOV CAR ART CLLGN FOR FUNC HEMO DYLS  [employer]-WD [id number] Left 1 Implanted         Drains:   [REMOVED] Urinary Catheter 03/08/23 Wilkerson (Removed)       Findings: scarred proximal cephalic vein    DESCRIPTION OF PROCEDURE: The patient was identified and the procedure was confirmed. The left arm was prepped and draped in the usual sterile fashion. Lidocaine 1% mixed with 0.25% Marcaine was used for local anesthesia. A longitudinal skin incision was made in the upper arm over the proximal cephalic vein fistula and carried down through the subcutaneous tissue. The cephalic vein was identified and dissected free from the surrounding tissues and branches were divided between silk ties as needed. It was noted that there was a long segment stenosis in the fistula proximally. A branch was mobilized and cut. It was than opened to use as a patch. Proximally and distally control of the fistula was obtained using bulldogs. 11 blade used to open fistula. Significant amount of scarring, sclerotic valves were noted. These were excised. The vein patch was cut to fit and sewn into place using 7.0 prolene in a running fashion. It was flushed proximally and distally prior to completion of closure.

## 2023-04-28 NOTE — PROGRESS NOTES
Instructions were reviewed with pt and her son before she was DC., Maryfraureliano  at [facility] was called and instructions were reviewed with her

## 2023-04-28 NOTE — ANESTHESIA PRE PROCEDURE
administered. Anesthetic plan and risks discussed with patient and child/children. Use of blood products discussed with patient and child/children whom consented to blood products. Plan discussed with CRNA and attending.                     Alicia Jeronimo RN SRNA  4/28/2023

## 2023-04-28 NOTE — H&P
Vascular Surgery History & Physical      HPI :    Bre Ricks is a 74 y.o. female who presents for evaluation of right AVF fistula and elective repair vs creation of new fistula.       ROS : Negative if blank [], Positive if [x]  General Vascular   [] Fevers [] Claudication (Blocks)   [] Chills [] Rest Pain   [] Weight Loss [] Tissue Loss   [] Chest Pain [] Clotting Disorder   [] SOB at rest [] Leg Swelling   [] SOB with exertion [] DVT/PE      [] Nausea    [] Vomitting [] Stroke/TIA   [] Abdominal Pain [] Focal weakness   [] Melena [] Slurred Speech   [] Hematochezia [] Vision Changes   [] Hematuria    [] Dysuria [] Hx of Central Catheters   [] Wears Glasses/Contacts  [] Dialysis and If so date initiated   [] Blindness     [] Right Hand Dominant   [] Difficulty swallowing        Past Medical History:   Diagnosis Date    Anemia     iron deficiency    Cirrhosis of liver (HCC)     Depression     Hypertension     Iron deficiency anemia, unspecified     Kidney failure     Thyroid disease         Past Surgical History:   Procedure Laterality Date    CT BIOPSY RENAL  11/10/2022    CT BIOPSY RENAL 11/10/2022 Juan Huntley MD [facility]    DIALYSIS FISTULA CREATION Left 2/10/2023    CREATION ARTERIOVENOUS FISTULA LEFT ARM performed by Alla Santamaria MD at [address] [place] [address] N/A 3/14/2023    EGD ESOPHAGOGASTRODUODENOSCOPY performed by Miller Hollis MD at [place] [address]       Current Medications:    sodium chloride      sodium chloride      sodium chloride        sodium chloride flush, sodium chloride, sodium chloride flush, sodium chloride, albuterol sulfate HFA, midazolam, lidocaine, ropivacaine, midazolam    sodium chloride flush  5-40 mL IntraVENous 2 times per day    ceFAZolin (ANCEF) IVPB  2,000 mg IntraVENous On Call to OR    fentanNYL  25 mcg IntraVENous Once    Or    fentanNYL  100 mcg IntraVENous Once    sodium chloride flush  5-40 mL IntraVENous 2 times per day    ropivacaine  30

## 2023-04-28 NOTE — ANESTHESIA PROCEDURE NOTES
Peripheral Block    Patient location during procedure: pre-op  Reason for block: post-op pain management  Start time: 4/28/2023 7:00 AM  End time: 4/28/2023 7:10 AM  Staffing  Performed: anesthesiologist   Anesthesiologist: Saranya Ang MD  Resident/CRNA: QUINTEN Sesay - RIOS  Other anesthesia staff: Breezy Vale RN  Preanesthetic Checklist  Completed: patient identified, IV checked, site marked, risks and benefits discussed, surgical/procedural consents, equipment checked, pre-op evaluation, timeout performed, anesthesia consent given, oxygen available and monitors applied/VS acknowledged  Peripheral Block   Patient position: sitting  Prep: ChloraPrep  Provider prep: sterile gloves and mask  Patient monitoring: cardiac monitor, continuous pulse ox, continuous capnometry, frequent blood pressure checks and IV access  Block type: Brachial plexus  Supraclavicular  Laterality: left  Injection technique: single-shot  Guidance: ultrasound guided  Infiltration strength: 1 %  Dose: 0.5 mL    Needle   Needle type: combined needle/nerve stimulator   Needle gauge: 22 G  Needle localization: ultrasound guidance  Needle length: 2 inch. Assessment   Injection assessment: negative aspiration for heme, no paresthesia on injection and local visualized surrounding nerve on ultrasound  Slow fractionated injection: yes  Hemodynamics: stable  Real-time US image taken/store: yes  Outcomes: uncomplicated and patient tolerated procedure well    Additional Notes  Tolerated procedure well, VSS.    Medications Administered  ropivacaine (NAROPIN) injection 0.5% - Perineural   30 mL - 4/28/2023 7:00:00 AM

## 2023-06-02 ENCOUNTER — TELEPHONE (OUTPATIENT)
Dept: VASCULAR SURGERY | Age: 73
End: 2023-06-02

## 2023-06-05 ENCOUNTER — TELEPHONE (OUTPATIENT)
Dept: VASCULAR SURGERY | Age: 73
End: 2023-06-05

## 2023-06-05 ENCOUNTER — OFFICE VISIT (OUTPATIENT)
Dept: VASCULAR SURGERY | Age: 73
End: 2023-06-05

## 2023-06-05 VITALS — BODY MASS INDEX: 29.26 KG/M2 | HEIGHT: 62 IN

## 2023-06-05 DIAGNOSIS — Z99.2 ENCOUNTER REGARDING VASCULAR ACCESS FOR DIALYSIS FOR END-STAGE RENAL DISEASE (HCC): Primary | ICD-10-CM

## 2023-06-05 DIAGNOSIS — N18.6 ENCOUNTER REGARDING VASCULAR ACCESS FOR DIALYSIS FOR END-STAGE RENAL DISEASE (HCC): Primary | ICD-10-CM

## 2023-06-05 PROCEDURE — 99024 POSTOP FOLLOW-UP VISIT: CPT | Performed by: NURSE PRACTITIONER

## 2023-06-05 NOTE — PROGRESS NOTES
6/5/2023    [facility]  1950    PCP: Antoine Winters MD   Nephrologist: Dr. Johnson Arciniega: Estella Coyne [facility]    Previous Vascular Access Procedures  2/10/23 L BC AVF   4/28/23 L BC AVF revision with artegraft patch      Patient returns for post operative evaluation. The patient denies any unexpected problems since the procedure. The wound is healing well. Denies left hand pain. PE  Gen NAD Awake and Alert  left Upper Extremity  Fistula has palpable thrill, and a bruit is auscultated  Bedside ultrasound measures fistula 0.59-0.6 cm in diameter. Sclerotic area no longer present. Brachial 2+    Radial 1+  Ulnar biphasic     Palmar biphasic  Wound is clean, dry and intact  with no evidence of cellulitis or drainage  No deficits in sensation or motor     A/P Dialysis Access  left brachiocephalic AV fistula  wound is healing well  no evidence of steal syndrome  av access is ready for cannulation- the facility will be notified  The tunneled cathter will be removed once her am access is functioning well  I reviewed with the patient that normal activities can be resumed as tolerated    Pt seen and plan reviewed with Dr. Moses Pate.      Vicky Swartz, APRBETZAIDA - CNP

## 2023-06-26 ENCOUNTER — TELEPHONE (OUTPATIENT)
Dept: VASCULAR SURGERY | Age: 73
End: 2023-06-26

## 2023-06-30 ENCOUNTER — PREP FOR PROCEDURE (OUTPATIENT)
Dept: VASCULAR SURGERY | Age: 73
End: 2023-06-30

## 2023-06-30 ENCOUNTER — TELEPHONE (OUTPATIENT)
Dept: VASCULAR SURGERY | Age: 73
End: 2023-06-30

## 2023-07-05 ENCOUNTER — HOSPITAL ENCOUNTER (OUTPATIENT)
Dept: INTERVENTIONAL RADIOLOGY/VASCULAR | Age: 73
Discharge: HOME | End: 2023-07-05
Payer: COMMERCIAL

## 2023-07-05 DIAGNOSIS — R18.8 OTHER ASCITES: ICD-10-CM

## 2023-07-05 PROCEDURE — C1729 CATH, DRAINAGE: HCPCS

## 2023-07-05 PROCEDURE — 49083 ABD PARACENTESIS W/IMAGING: CPT

## 2023-07-05 PROCEDURE — P9047 ALBUMIN (HUMAN), 25%, 50ML: HCPCS | Performed by: PHYSICIAN ASSISTANT

## 2023-07-05 PROCEDURE — 88112 CYTOPATH CELL ENHANCE TECH: CPT

## 2023-07-05 PROCEDURE — 6360000002 HC RX W HCPCS: Performed by: PHYSICIAN ASSISTANT

## 2023-07-05 PROCEDURE — 88305 TISSUE EXAM BY PATHOLOGIST: CPT

## 2023-07-05 RX ORDER — ALBUMIN (HUMAN) 12.5 G/50ML
75 SOLUTION INTRAVENOUS ONCE
Status: COMPLETED | OUTPATIENT
Start: 2023-07-05 | End: 2023-07-05

## 2023-07-05 RX ADMIN — ALBUMIN (HUMAN) 75 G: 0.25 INJECTION, SOLUTION INTRAVENOUS at 10:16

## 2023-07-05 NOTE — PROGRESS NOTES
Patient here for ultrasound guided paracentesis. Instructions given and questions answered. Consent signed. Abdomen scanned and marked under the guidance of procedural Radiologist.     8007  start procedure /68 80 17 98% on room air    1007 end procedure /94 76 16 100% on room air     9300 cc drained of clear straw colored ascites fluid.       Dry sterile dressing of folded 4 x 4 tegderm to RLQ     75 grams of IV Albumin given, per order    [facility] for patient to be discharged home

## 2023-07-07 ENCOUNTER — HOSPITAL ENCOUNTER (EMERGENCY)
Age: 73
Discharge: HOME | End: 2023-07-08
Attending: EMERGENCY MEDICINE
Payer: COMMERCIAL

## 2023-07-07 DIAGNOSIS — D64.9 ANEMIA, UNSPECIFIED TYPE: Primary | ICD-10-CM

## 2023-07-07 LAB
HCT VFR BLD AUTO: 21.4 % (ref 34–48)
HGB BLD-MCNC: 6.3 G/DL (ref 11.5–15.5)

## 2023-07-07 PROCEDURE — 86900 BLOOD TYPING SEROLOGIC ABO: CPT

## 2023-07-07 PROCEDURE — 36430 TRANSFUSION BLD/BLD COMPNT: CPT

## 2023-07-07 PROCEDURE — P9016 RBC LEUKOCYTES REDUCED: HCPCS

## 2023-07-07 PROCEDURE — 86923 COMPATIBILITY TEST ELECTRIC: CPT

## 2023-07-07 PROCEDURE — 36415 COLL VENOUS BLD VENIPUNCTURE: CPT

## 2023-07-07 PROCEDURE — 86901 BLOOD TYPING SEROLOGIC RH(D): CPT

## 2023-07-07 PROCEDURE — 86850 RBC ANTIBODY SCREEN: CPT

## 2023-07-07 PROCEDURE — 85018 HEMOGLOBIN: CPT

## 2023-07-07 PROCEDURE — 85014 HEMATOCRIT: CPT

## 2023-07-07 PROCEDURE — 99285 EMERGENCY DEPT VISIT HI MDM: CPT

## 2023-07-07 RX ORDER — SODIUM CHLORIDE 9 MG/ML
INJECTION, SOLUTION INTRAVENOUS PRN
Status: COMPLETED | OUTPATIENT
Start: 2023-07-07 | End: 2023-07-08

## 2023-07-07 ASSESSMENT — PAIN - FUNCTIONAL ASSESSMENT: PAIN_FUNCTIONAL_ASSESSMENT: NONE - DENIES PAIN

## 2023-07-08 VITALS
WEIGHT: 160 LBS | RESPIRATION RATE: 20 BRPM | TEMPERATURE: 97.4 F | DIASTOLIC BLOOD PRESSURE: 65 MMHG | HEART RATE: 76 BPM | OXYGEN SATURATION: 96 % | BODY MASS INDEX: 29.26 KG/M2 | SYSTOLIC BLOOD PRESSURE: 133 MMHG

## 2023-07-08 LAB
ABO + RH BLD: NORMAL
BLD GP AB SCN SERPL QL: NORMAL
BLOOD BANK DISPENSE STATUS: NORMAL
BLOOD BANK PRODUCT CODE: NORMAL
BPU ID: NORMAL
DESCRIPTION BLOOD BANK: NORMAL

## 2023-07-08 PROCEDURE — 2580000003 HC RX 258: Performed by: EMERGENCY MEDICINE

## 2023-07-08 RX ADMIN — SODIUM CHLORIDE: 900 INJECTION, SOLUTION INTRAVENOUS at 01:57

## 2023-07-08 NOTE — ED NOTES
Blood consent obtained from patient. Risks and benefits explained by physician.      Lisette Mccurdy RN  07/08/23 0003

## 2023-07-20 ENCOUNTER — HOSPITAL ENCOUNTER (EMERGENCY)
Age: 73
Discharge: HOME | End: 2023-07-20
Attending: EMERGENCY MEDICINE
Payer: COMMERCIAL

## 2023-07-20 VITALS
HEIGHT: 62 IN | WEIGHT: 160 LBS | RESPIRATION RATE: 16 BRPM | SYSTOLIC BLOOD PRESSURE: 121 MMHG | BODY MASS INDEX: 29.44 KG/M2 | TEMPERATURE: 98.5 F | OXYGEN SATURATION: 97 % | DIASTOLIC BLOOD PRESSURE: 56 MMHG | HEART RATE: 80 BPM

## 2023-07-20 DIAGNOSIS — N18.6 STAGE 5 CHRONIC KIDNEY DISEASE ON CHRONIC DIALYSIS (HCC): Primary | ICD-10-CM

## 2023-07-20 DIAGNOSIS — Z99.2 STAGE 5 CHRONIC KIDNEY DISEASE ON CHRONIC DIALYSIS (HCC): Primary | ICD-10-CM

## 2023-07-20 DIAGNOSIS — D64.9 CHRONIC ANEMIA: ICD-10-CM

## 2023-07-20 LAB
ANION GAP SERPL CALCULATED.3IONS-SCNC: 13 MMOL/L (ref 7–16)
BASOPHILS # BLD: 0 K/UL (ref 0–0.2)
BASOPHILS NFR BLD: 0 % (ref 0–2)
BUN SERPL-MCNC: 47 MG/DL (ref 6–23)
CALCIUM SERPL-MCNC: 7.8 MG/DL (ref 8.6–10.2)
CHLORIDE SERPL-SCNC: 97 MMOL/L (ref 98–107)
CO2 SERPL-SCNC: 27 MMOL/L (ref 22–29)
CREAT SERPL-MCNC: 5 MG/DL (ref 0.5–1)
EOSINOPHIL # BLD: 0.03 K/UL (ref 0.05–0.5)
EOSINOPHILS RELATIVE PERCENT: 1 % (ref 0–6)
ERYTHROCYTE [DISTWIDTH] IN BLOOD BY AUTOMATED COUNT: 15.6 % (ref 11.5–15)
GFR SERPL CREATININE-BSD FRML MDRD: 8 ML/MIN/1.73M2
GLUCOSE SERPL-MCNC: 155 MG/DL (ref 74–107)
HCT VFR BLD AUTO: 26.5 % (ref 34–48)
HGB BLD-MCNC: 7.8 G/DL (ref 11.5–15.5)
LYMPHOCYTES NFR BLD: 0.27 K/UL (ref 1.5–4)
LYMPHOCYTES RELATIVE PERCENT: 7 % (ref 20–42)
MCH RBC QN AUTO: 25.2 PG (ref 26–35)
MCHC RBC AUTO-ENTMCNC: 29.4 G/DL (ref 32–34.5)
MCV RBC AUTO: 85.5 FL (ref 80–99.9)
MONOCYTES NFR BLD: 0.1 K/UL (ref 0.1–0.95)
MONOCYTES NFR BLD: 3 % (ref 2–12)
NEUTROPHILS NFR BLD: 89 % (ref 43–80)
NEUTS SEG NFR BLD: 3.4 K/UL (ref 1.8–7.3)
PLATELET # BLD AUTO: 104 K/UL (ref 130–450)
PMV BLD AUTO: 11.8 FL (ref 7–12)
POTASSIUM SERPL-SCNC: 4.6 MMOL/L (ref 3.5–5)
RBC # BLD AUTO: 3.1 M/UL (ref 3.5–5.5)
RBC # BLD: ABNORMAL 10*6/UL
SODIUM SERPL-SCNC: 137 MMOL/L (ref 132–146)
WBC OTHER # BLD: 3.8 K/UL (ref 4.5–11.5)

## 2023-07-20 PROCEDURE — 99284 EMERGENCY DEPT VISIT MOD MDM: CPT

## 2023-07-20 PROCEDURE — 36415 COLL VENOUS BLD VENIPUNCTURE: CPT

## 2023-07-20 PROCEDURE — 80048 BASIC METABOLIC PNL TOTAL CA: CPT

## 2023-07-20 PROCEDURE — 85027 COMPLETE CBC AUTOMATED: CPT

## 2023-07-20 NOTE — CARE COORDINATION
SS note: SW called [facility] & arranged transport via [facility]- ETA is [phone]. SW completed [employer] form and placed in soft chart w/face sheet. TIFFANY notified Sophie charge. TIFFANY updated the comments section of the ED track Board with ETA for [facility].    Electronically signed by NAVJOT Novak on 7/20/2023 at 1:07 PM

## 2023-07-21 ENCOUNTER — HOSPITAL ENCOUNTER (OUTPATIENT)
Dept: CARDIAC CATH/INVASIVE PROCEDURES | Age: 73
Discharge: HOME | End: 2023-07-21
Payer: MEDICARE

## 2023-07-21 VITALS
RESPIRATION RATE: 18 BRPM | TEMPERATURE: 97.2 F | HEART RATE: 77 BPM | WEIGHT: 139 LBS | HEIGHT: 62 IN | OXYGEN SATURATION: 94 % | BODY MASS INDEX: 25.58 KG/M2 | DIASTOLIC BLOOD PRESSURE: 68 MMHG | SYSTOLIC BLOOD PRESSURE: 137 MMHG

## 2023-07-21 PROCEDURE — 2500000003 HC RX 250 WO HCPCS

## 2023-07-21 PROCEDURE — 36902 INTRO CATH DIALYSIS CIRCUIT: CPT

## 2023-07-21 PROCEDURE — 2709999900 HC NON-CHARGEABLE SUPPLY

## 2023-07-21 PROCEDURE — C1725 CATH, TRANSLUMIN NON-LASER: HCPCS

## 2023-07-21 PROCEDURE — C1894 INTRO/SHEATH, NON-LASER: HCPCS

## 2023-07-21 PROCEDURE — C1769 GUIDE WIRE: HCPCS

## 2023-07-21 RX ORDER — SODIUM CHLORIDE 9 MG/ML
INJECTION, SOLUTION INTRAVENOUS PRN
Status: DISCONTINUED | OUTPATIENT
Start: 2023-07-21 | End: 2023-07-22 | Stop reason: HOSPADM

## 2023-07-21 RX ORDER — RIVASTIGMINE TARTRATE 1.5 MG/1
1.5 CAPSULE ORAL 2 TIMES DAILY
COMMUNITY

## 2023-07-21 RX ORDER — FLUVOXAMINE MALEATE 50 MG/1
50 TABLET, COATED ORAL 2 TIMES DAILY
COMMUNITY

## 2023-07-21 RX ORDER — POLYETHYLENE GLYCOL 3350 17 G/17G
17 POWDER, FOR SOLUTION ORAL DAILY PRN
COMMUNITY

## 2023-07-21 RX ORDER — SODIUM CHLORIDE 0.9 % (FLUSH) 0.9 %
5-40 SYRINGE (ML) INJECTION PRN
Status: DISCONTINUED | OUTPATIENT
Start: 2023-07-21 | End: 2023-07-22 | Stop reason: HOSPADM

## 2023-07-21 RX ORDER — SODIUM CHLORIDE 0.9 % (FLUSH) 0.9 %
5-40 SYRINGE (ML) INJECTION EVERY 12 HOURS SCHEDULED
Status: DISCONTINUED | OUTPATIENT
Start: 2023-07-21 | End: 2023-07-22 | Stop reason: HOSPADM

## 2023-07-21 NOTE — DISCHARGE INSTRUCTIONS
Discharge Instructions for Fistulagram      Call Dr. Kaylen Carney office [phone] for follow-up appointment. An fistulagram , is an x-ray used by physicians to diagnose blockages and other problems in veins. A catheter is inserted into a blood vessel, and a special dye is injected that makes blood vessels visible when an x-ray is taken. The procedure can take one hour to several hours. Afterwards, you may need to be in a recovery area for an hour, where you will be monitored by a nurse. What You Will Need   Along with your medications, you will need bandages   Steps to [address] the dressing around the catheter incision area as instructed. Bathe and shower as usual, but keep the wound dry and covered with a bandage for the first day after surgery. Diet    You may go back to your regular diet after the procedure. Physical Activity    Do not lift heavy objects or engage in any strenuous exercise or sexual activity for a minimum of 24 hours. Ask your doctor when you will be able to return to work. Do not drive until your doctor tells you to do so. Medications    If you are taking medications, follow these general guidelines:   Talk to your doctor about what the best pain relief medicine for you is. Take your medication as directednot more, not less, not at a different time. If you are taking warfarin or persantine, your doctor will tell you when to stop these. Do not stop taking your medications without consulting your healthcare provider. Don't share medications with anyone else. Know what effects and side effects to expect, and report them to your healthcare provider. If you are taking more than one drug, even if it is an over-the-counter medication, herb, or dietary supplement, be sure to check with a physician or pharmacist about drug interactions. Plan ahead for refills so you don't run out.    Follow-up   Your doctor will discuss the findings and suggest

## 2023-07-21 NOTE — OP NOTE
Cardiovascular Lab Procedure Report     Sparkle Fuller  1950    Date : 7/21/2023  Surgeon: Rocco Kirk M.D. Pre-procedure Diagnosis: ESRD, Poorly functioning fistula  Post-procedure Diagnosis: Same  Procedure:     US guided access of the Left cephalic vein   Left upper extremity fistulagram   Plasty of proximal cephalic vein with 4P21 mustang  Anesthesia: Local   Assistants: Cath Lab Staff  Estimated Blood Loss: Minimal  Complications: none  Findings:   Left S/p Intervention   Proximal Cephalic Vein > 64% stenosis Patent   Distal Cephailc Vein Patent Patent   Arteriovenous anastamosis Patent Patent   Basilic Vein Patent Patent   Axillary Vein Patent Patent   Subclavian Vein Patent Patent   Brachiocephalic Vein Patent Patent   Superior Vena Cava Patent Patent     Procedure Details :  Timeout preformed identifying pt and procedure. Left arm prepped and draped in sterile fashion. The Left cephalic vein-fistula was identified under US and noted to be patent. It was accessed under US guidance after infiltrating with local. Printer used to print a still image. Micropuncture placed, exchanged out for 4 fr sheath. Fistulogram preformed. There was a high grade stenosis of the proximal cepahlic vein . A sheath exchange with a 5 fr sheath was done. Angle glide wire used to traverse stenosis. The lesion was plastied with 5x40, and than 6x40 mustang balloon. There was evidence of no significant residual stenosis. Completion fistulogram noted much improved filling distally and brisk flow though the stenotic area. Sheath and wire were removed and pressure was held.       Postop Exam  Left arm   - Radial 2+   - Thrill is has significantly improved    Plan  Plan for L forearm loop graft most likely - 4K86 with cephalic vein as outflow as pt not tolerating being accessed and has known cirrhosis    Rocco Kirk MD    PCP : Gelacio Lindsay MD  Nephrologist : Dr Juan Hector  ESRD T

## 2023-07-31 ENCOUNTER — TELEPHONE (OUTPATIENT)
Dept: VASCULAR SURGERY | Age: 73
End: 2023-07-31

## 2023-07-31 NOTE — TELEPHONE ENCOUNTER
Nereida at [facility] dialysis instructed to try pt's AVF, if not successful, will proceed with AVG on 8/25 as scheduled.

## 2023-08-21 RX ORDER — LEVOTHYROXINE SODIUM 0.1 MG/1
TABLET ORAL
COMMUNITY
Start: 2023-07-15 | End: 2023-08-21

## 2023-08-21 NOTE — PROGRESS NOTES
[address]   PRE-ADMISSION TESTING GENERAL INSTRUCTIONS  [facility] Phone Number: [phone]    Instructions faxed to [facility] ([phone])    GENERAL INSTRUCTIONS:    [x] Antibacterial Soap Shower Night before and/or AM of surgery. [x] Do not wear makeup, lotions, powders, deodorant. [x] Nothing by mouth after midnight; including gum, candy, mints, or water. [x] You may brush your teeth, gargle, but do NOT swallow water. [x] No tobacco products, illegal drugs, or alcohol within 24 hours of your surgery. [x] Jewelry or valuables should not be brought to the hospital. All body and/or tongue piercing's must be removed prior to arriving to hospital. No contact lens or hair pins. [x] Arrange transportation with a responsible adult  to and from the hospital. Arrange for someone to be with you for the remainder of the day and for 24 hours after your procedure due to having had anesthesia. -Who will be your  for transportation? [facility]         -Who will be staying with you for 24 hrs after your procedure? From [facility] - will return  [x] Bring insurance card and photo ID. PARKING INSTRUCTIONS:     [x] ARRIVAL DATE & TIME: 8/25/23 @ 9:20AM  [x] Times are subject to change. We will contact you the business day before surgery if that were to occur. [x] Enter into the [employer]. Two people may accompany you. Masks are not required. [x] Parking Lot \"I\" is where you will park. It is located on the corner of [address] [place] [address] and [address] [place] [address]. The entrance is on [facility]. Only one vehicle - per patient, is permitted in parking lot. Upon entering the parking lot, a voucher ticket will print. MEDICATION INSTRUCTIONS:    [x] Bring a complete list of your medications, please write the last time you took the medicine, give this list to the nurse in Pre-Op.     [x] Take only the following medications the morning of

## 2023-08-25 ENCOUNTER — ANESTHESIA EVENT (OUTPATIENT)
Dept: OPERATING ROOM | Age: 73
End: 2023-08-25
Payer: COMMERCIAL

## 2023-08-25 ENCOUNTER — HOSPITAL ENCOUNTER (OUTPATIENT)
Age: 73
Setting detail: OUTPATIENT SURGERY
Discharge: OTHER INSTITUTION | End: 2023-08-25
Attending: SURGERY | Admitting: SURGERY
Payer: COMMERCIAL

## 2023-08-25 ENCOUNTER — ANESTHESIA (OUTPATIENT)
Dept: OPERATING ROOM | Age: 73
End: 2023-08-25
Payer: COMMERCIAL

## 2023-08-25 VITALS
HEART RATE: 72 BPM | TEMPERATURE: 97.9 F | HEIGHT: 62 IN | SYSTOLIC BLOOD PRESSURE: 134 MMHG | WEIGHT: 135 LBS | BODY MASS INDEX: 24.84 KG/M2 | DIASTOLIC BLOOD PRESSURE: 58 MMHG | RESPIRATION RATE: 25 BRPM | OXYGEN SATURATION: 94 %

## 2023-08-25 DIAGNOSIS — G89.18 POST-OP PAIN: Primary | ICD-10-CM

## 2023-08-25 LAB
ABO + RH BLD: NORMAL
ANION GAP SERPL CALCULATED.3IONS-SCNC: 13 MMOL/L (ref 7–16)
ARM BAND NUMBER: NORMAL
BLOOD BANK SAMPLE EXPIRATION: NORMAL
BLOOD GROUP ANTIBODIES SERPL: NEGATIVE
BUN SERPL-MCNC: 31 MG/DL (ref 6–23)
CALCIUM SERPL-MCNC: 7.8 MG/DL (ref 8.6–10.2)
CHLORIDE SERPL-SCNC: 91 MMOL/L (ref 98–107)
CO2 SERPL-SCNC: 31 MMOL/L (ref 22–29)
CREAT SERPL-MCNC: 3.8 MG/DL (ref 0.5–1)
ERYTHROCYTE [DISTWIDTH] IN BLOOD BY AUTOMATED COUNT: 16.7 % (ref 11.5–15)
GFR SERPL CREATININE-BSD FRML MDRD: 12 ML/MIN/1.73M2
GLUCOSE SERPL-MCNC: 87 MG/DL (ref 74–99)
HCT VFR BLD AUTO: 30.6 % (ref 34–48)
HGB BLD-MCNC: 8.4 G/DL (ref 11.5–15.5)
INR PPP: 1.4
MCH RBC QN AUTO: 22.3 PG (ref 26–35)
MCHC RBC AUTO-ENTMCNC: 27.5 G/DL (ref 32–34.5)
MCV RBC AUTO: 81.2 FL (ref 80–99.9)
PLATELET # BLD AUTO: 116 K/UL (ref 130–450)
PMV BLD AUTO: 12.6 FL (ref 7–12)
POTASSIUM SERPL-SCNC: 4.6 MMOL/L (ref 3.5–5)
PROTHROMBIN TIME: 14.7 SEC (ref 9.3–12.4)
RBC # BLD AUTO: 3.77 M/UL (ref 3.5–5.5)
SODIUM SERPL-SCNC: 135 MMOL/L (ref 132–146)
WBC OTHER # BLD: 4.4 K/UL (ref 4.5–11.5)

## 2023-08-25 PROCEDURE — 36830 ARTERY-VEIN NONAUTOGRAFT: CPT | Performed by: SURGERY

## 2023-08-25 PROCEDURE — 6360000002 HC RX W HCPCS: Performed by: ANESTHESIOLOGY

## 2023-08-25 PROCEDURE — A4217 STERILE WATER/SALINE, 500 ML: HCPCS | Performed by: SURGERY

## 2023-08-25 PROCEDURE — 86850 RBC ANTIBODY SCREEN: CPT

## 2023-08-25 PROCEDURE — 7100000011 HC PHASE II RECOVERY - ADDTL 15 MIN: Performed by: SURGERY

## 2023-08-25 PROCEDURE — 86901 BLOOD TYPING SEROLOGIC RH(D): CPT

## 2023-08-25 PROCEDURE — 2580000003 HC RX 258: Performed by: SURGERY

## 2023-08-25 PROCEDURE — 80048 BASIC METABOLIC PNL TOTAL CA: CPT

## 2023-08-25 PROCEDURE — 85027 COMPLETE CBC AUTOMATED: CPT

## 2023-08-25 PROCEDURE — 6360000002 HC RX W HCPCS: Performed by: SURGERY

## 2023-08-25 PROCEDURE — 6360000002 HC RX W HCPCS: Performed by: NURSE PRACTITIONER

## 2023-08-25 PROCEDURE — 3600000012 HC SURGERY LEVEL 2 ADDTL 15MIN: Performed by: SURGERY

## 2023-08-25 PROCEDURE — 3700000000 HC ANESTHESIA ATTENDED CARE: Performed by: SURGERY

## 2023-08-25 PROCEDURE — 3600000002 HC SURGERY LEVEL 2 BASE: Performed by: SURGERY

## 2023-08-25 PROCEDURE — 2580000003 HC RX 258: Performed by: NURSE PRACTITIONER

## 2023-08-25 PROCEDURE — 86900 BLOOD TYPING SEROLOGIC ABO: CPT

## 2023-08-25 PROCEDURE — 85610 PROTHROMBIN TIME: CPT

## 2023-08-25 PROCEDURE — 3700000001 HC ADD 15 MINUTES (ANESTHESIA): Performed by: SURGERY

## 2023-08-25 PROCEDURE — C1768 GRAFT, VASCULAR: HCPCS | Performed by: SURGERY

## 2023-08-25 PROCEDURE — 6360000002 HC RX W HCPCS: Performed by: ANESTHESIOLOGIST ASSISTANT

## 2023-08-25 PROCEDURE — 2709999900 HC NON-CHARGEABLE SUPPLY: Performed by: SURGERY

## 2023-08-25 PROCEDURE — 7100000010 HC PHASE II RECOVERY - FIRST 15 MIN: Performed by: SURGERY

## 2023-08-25 PROCEDURE — 64415 NJX AA&/STRD BRCH PLXS IMG: CPT | Performed by: ANESTHESIOLOGY

## 2023-08-25 DEVICE — GORE ACUSEAL VASCULAR GRAFT 4-7MMX45CM TAPERED HEPARIN
Type: IMPLANTABLE DEVICE | Site: ARM | Status: FUNCTIONAL
Brand: GORE ACUSEAL VASCULAR GRAFT

## 2023-08-25 RX ORDER — SODIUM CHLORIDE 0.9 % (FLUSH) 0.9 %
5-40 SYRINGE (ML) INJECTION PRN
Status: DISCONTINUED | OUTPATIENT
Start: 2023-08-25 | End: 2023-08-25 | Stop reason: HOSPADM

## 2023-08-25 RX ORDER — HEPARIN SODIUM 1000 [USP'U]/ML
INJECTION, SOLUTION INTRAVENOUS; SUBCUTANEOUS PRN
Status: DISCONTINUED | OUTPATIENT
Start: 2023-08-25 | End: 2023-08-25 | Stop reason: SDUPTHER

## 2023-08-25 RX ORDER — PROTAMINE SULFATE 10 MG/ML
INJECTION, SOLUTION INTRAVENOUS PRN
Status: DISCONTINUED | OUTPATIENT
Start: 2023-08-25 | End: 2023-08-25 | Stop reason: SDUPTHER

## 2023-08-25 RX ORDER — FENTANYL CITRATE 50 UG/ML
25 INJECTION, SOLUTION INTRAMUSCULAR; INTRAVENOUS ONCE
Status: COMPLETED | OUTPATIENT
Start: 2023-08-25 | End: 2023-08-25

## 2023-08-25 RX ORDER — SODIUM CHLORIDE 0.9 % (FLUSH) 0.9 %
5-40 SYRINGE (ML) INJECTION EVERY 12 HOURS SCHEDULED
Status: DISCONTINUED | OUTPATIENT
Start: 2023-08-25 | End: 2023-08-25 | Stop reason: HOSPADM

## 2023-08-25 RX ORDER — SODIUM CHLORIDE 9 MG/ML
INJECTION, SOLUTION INTRAVENOUS CONTINUOUS
Status: DISCONTINUED | OUTPATIENT
Start: 2023-08-25 | End: 2023-08-25 | Stop reason: HOSPADM

## 2023-08-25 RX ORDER — ROPIVACAINE HYDROCHLORIDE 5 MG/ML
30 INJECTION, SOLUTION EPIDURAL; INFILTRATION; PERINEURAL ONCE
Status: COMPLETED | OUTPATIENT
Start: 2023-08-25 | End: 2023-08-25

## 2023-08-25 RX ORDER — ROPIVACAINE HYDROCHLORIDE 5 MG/ML
INJECTION, SOLUTION EPIDURAL; INFILTRATION; PERINEURAL
Status: COMPLETED | OUTPATIENT
Start: 2023-08-25 | End: 2023-08-25

## 2023-08-25 RX ORDER — OXYCODONE HYDROCHLORIDE AND ACETAMINOPHEN 5; 325 MG/1; MG/1
1 TABLET ORAL EVERY 6 HOURS PRN
Qty: 28 TABLET | Refills: 0 | Status: SHIPPED | OUTPATIENT
Start: 2023-08-25 | End: 2023-08-25 | Stop reason: HOSPADM

## 2023-08-25 RX ORDER — PROPOFOL 10 MG/ML
INJECTION, EMULSION INTRAVENOUS CONTINUOUS PRN
Status: DISCONTINUED | OUTPATIENT
Start: 2023-08-25 | End: 2023-08-25 | Stop reason: SDUPTHER

## 2023-08-25 RX ORDER — ALBUTEROL SULFATE 90 UG/1
2 AEROSOL, METERED RESPIRATORY (INHALATION) EVERY 6 HOURS PRN
Status: DISCONTINUED | OUTPATIENT
Start: 2023-08-25 | End: 2023-08-25 | Stop reason: HOSPADM

## 2023-08-25 RX ORDER — SODIUM CHLORIDE 9 MG/ML
INJECTION, SOLUTION INTRAVENOUS PRN
Status: DISCONTINUED | OUTPATIENT
Start: 2023-08-25 | End: 2023-08-25 | Stop reason: HOSPADM

## 2023-08-25 RX ORDER — FENTANYL CITRATE 50 UG/ML
100 INJECTION, SOLUTION INTRAMUSCULAR; INTRAVENOUS ONCE
Status: COMPLETED | OUTPATIENT
Start: 2023-08-25 | End: 2023-08-25

## 2023-08-25 RX ORDER — PROPOFOL 10 MG/ML
INJECTION, EMULSION INTRAVENOUS PRN
Status: DISCONTINUED | OUTPATIENT
Start: 2023-08-25 | End: 2023-08-25 | Stop reason: SDUPTHER

## 2023-08-25 RX ORDER — MIDAZOLAM HYDROCHLORIDE 2 MG/2ML
2 INJECTION, SOLUTION INTRAMUSCULAR; INTRAVENOUS EVERY 10 MIN PRN
Status: DISCONTINUED | OUTPATIENT
Start: 2023-08-25 | End: 2023-08-25 | Stop reason: HOSPADM

## 2023-08-25 RX ADMIN — SODIUM CHLORIDE: 9 INJECTION, SOLUTION INTRAVENOUS at 12:34

## 2023-08-25 RX ADMIN — MIDAZOLAM 2 MG: 1 INJECTION INTRAMUSCULAR; INTRAVENOUS at 11:07

## 2023-08-25 RX ADMIN — HEPARIN SODIUM 5000 UNITS: 1000 INJECTION INTRAVENOUS; SUBCUTANEOUS at 13:22

## 2023-08-25 RX ADMIN — PROTAMINE SULFATE 25 MG: 10 INJECTION, SOLUTION INTRAVENOUS at 14:12

## 2023-08-25 RX ADMIN — ROPIVACAINE HYDROCHLORIDE 30 ML: 5 INJECTION EPIDURAL; INFILTRATION; PERINEURAL at 11:00

## 2023-08-25 RX ADMIN — ROPIVACAINE HYDROCHLORIDE 30 ML: 5 INJECTION, SOLUTION EPIDURAL; INFILTRATION; PERINEURAL at 11:07

## 2023-08-25 RX ADMIN — FENTANYL CITRATE 50 MCG: 0.05 INJECTION, SOLUTION INTRAMUSCULAR; INTRAVENOUS at 11:08

## 2023-08-25 RX ADMIN — PROPOFOL 50 MCG/KG/MIN: 10 INJECTION, EMULSION INTRAVENOUS at 12:47

## 2023-08-25 RX ADMIN — PROPOFOL 30 MG: 10 INJECTION, EMULSION INTRAVENOUS at 12:44

## 2023-08-25 RX ADMIN — CEFAZOLIN 2000 MG: 2 INJECTION, POWDER, FOR SOLUTION INTRAMUSCULAR; INTRAVENOUS at 12:45

## 2023-08-25 ASSESSMENT — LIFESTYLE VARIABLES: SMOKING_STATUS: 0

## 2023-08-25 ASSESSMENT — PAIN - FUNCTIONAL ASSESSMENT: PAIN_FUNCTIONAL_ASSESSMENT: 0-10

## 2023-08-25 NOTE — H&P
APRN - CNP    sodium chloride flush 0.9 % injection 5-40 mL, 5-40 mL, IntraVENous, 2 times per day, Deja Bush, APRN - CNP    sodium chloride flush 0.9 % injection 5-40 mL, 5-40 mL, IntraVENous, PRN, Deja Bush, APRN - CNP    0.9 % sodium chloride infusion, , IntraVENous, PRN, Deja Zamudioato, APRN - CNP    ceFAZolin (ANCEF) 2,000 mg in sterile water 20 mL IV syringe, 2,000 mg, IntraVENous, On Call to OR, QUINTEN Askew - CNP    sodium chloride flush 0.9 % injection 5-40 mL, 5-40 mL, IntraVENous, 2 times per day, Eduardo Lopez MD    sodium chloride flush 0.9 % injection 5-40 mL, 5-40 mL, IntraVENous, PRN, Maritza Shanks MD    0.9 % sodium chloride infusion, , IntraVENous, PRN, Maritza Villa MD    albuterol sulfate HFA (PROVENTIL;VENTOLIN;PROAIR) 108 (90 Base) MCG/ACT inhaler 2 puff, 2 puff, Inhalation, Q6H PRN, Maritza Villa MD    midazolam PF (VERSED) injection 2 mg, 2 mg, IntraVENous, Q10 Min PRN, Eduardo Lopez MD, 2 mg at 08/25/23 1107  Allergies:  Patient has no known allergies. Social History     Socioeconomic History    Marital status:      Spouse name: Not on file    Number of children: 2    Years of education: Not on file    Highest education level: Not on file   Occupational History    Not on file   Tobacco Use    Smoking status: Never    Smokeless tobacco: Never   Vaping Use    Vaping Use: Never used   Substance and Sexual Activity    Alcohol use: Not Currently     Comment: rare    Drug use: Never    Sexual activity: Not on file   Other Topics Concern    Not on file   Social History Narrative    Not on file     Social Determinants of Health     Financial Resource Strain: Not on file   Food Insecurity: Not on file   Transportation Needs: Not on file   Physical Activity: Not on file   Stress: Not on file   Social Connections: Not on file   Intimate Partner Violence: Not on file   Housing Stability: Not on file     History reviewed.  No pertinent family history. REVIEW OF SYSTEMS:  The chart was reviewed.     PHYSICAL EXAM:    Vitals:    08/25/23 1100   BP: (!) 125/56   Pulse: 74   Resp: 21   Temp:    SpO2: 100%     CONSTITUTIONAL:  awake, alert, cooperative, no apparent distress, and appears stated age  NECK:  supple, symmetrical, trachea midline  LUNGS:  no increased work of breathing, good resp excursion  CARDIOVASCULAR:  S1S2   ABDOMEN:  soft, non-distended and non-tender  left upper extremity   Brachial 2+ Radial 1+   +thrill in cephalic fistula upper arm    LABS:    Lab Results   Component Value Date    WBC 4.4 (L) 08/25/2023    HGB 8.4 (L) 08/25/2023    HCT 30.6 (L) 08/25/2023     (L) 08/25/2023    PROTIME 14.7 (H) 08/25/2023    INR 1.4 08/25/2023    K 4.6 08/25/2023    BUN 31 (H) 08/25/2023    CREATININE 3.8 (H) 08/25/2023       Alma Hilario MD

## 2023-08-25 NOTE — ANESTHESIA PROCEDURE NOTES
Peripheral Block    Patient location during procedure: pre-op  Reason for block: post-op pain management and at surgeon's request  Start time: 8/25/2023 11:00 AM  End time: 8/25/2023 11:10 AM  Staffing  Performed: anesthesiologist   Anesthesiologist: Parvez Ramires MD  Preanesthetic Checklist  Completed: patient identified, IV checked, site marked, risks and benefits discussed, surgical/procedural consents, equipment checked, pre-op evaluation, timeout performed, anesthesia consent given, oxygen available, monitors applied/VS acknowledged, fire risk safety assessment completed and verbalized and blood product R/B/A discussed and consented  Peripheral Block   Patient position: supine  Prep: ChloraPrep  Provider prep: mask and sterile gloves  Patient monitoring: cardiac monitor, continuous pulse ox, frequent blood pressure checks and IV access  Block type: Brachial plexus  Supraclavicular  Laterality: left  Injection technique: single-shot  Guidance: ultrasound guided    Needle   Needle type: combined needle/nerve stimulator   Needle gauge: 22 G  Needle localization: anatomical landmarks and ultrasound guidance  Needle length: 5 cm  Assessment   Injection assessment: negative aspiration for heme, no paresthesia on injection, local visualized surrounding nerve on ultrasound and no intravascular symptoms  Paresthesia pain: none  Slow fractionated injection: yes  Hemodynamics: stable  Real-time US image taken/store: yes  Outcomes: uncomplicated and patient tolerated procedure well    Additional Notes  Time out performed. Local anesthetic injected easily in incremental doses and without paresthesia.   Well tolerated  Medications Administered  ropivacaine (NAROPIN) injection 0.5% - Perineural   30 mL - 8/25/2023 11:00:00 AM

## 2023-08-25 NOTE — OP NOTE
Operative Note      Patient: Kamron Villareal  YOB: 1950  MRN: [id number]    Date of Procedure: 8/25/2023    Pre-Op Diagnosis Codes:     * End stage renal disease ([address]) [N18.6]    Post-Op Diagnosis: Same       Procedure(s):  INSERTION LEFT UPPER EXTREMITY ARTERIOVENOUS GRAFT  Brachial -Cephalic 4-7 mm acuseal     Surgeon(s):  Uzma Last MD    Assistant:   Surgical Assistant: Belkis Sharpe  Resident: Leanne Nichols DO    Anesthesia: Regional    Estimated Blood Loss (mL): less than 560     Complications: None    Specimens:   * No specimens in log *    Implants:  Implant Name Type Inv. Item Serial No.  Lot No. LRB No. Used Action   GRAFT VASC L45CM [id number] TAPR ACUSEAL - [id number] Vascular grafts GRAFT VASC L45CM [id number] TAPR ACUSEAL [id number]  [employer]  Left 1 Implanted     DESCRIPTION OF PROCEDURE: The patient was identified and the procedure was confirmed. The left arm was prepped and draped in the usual sterile fashion. Lidocaine 1% mixed with 0.25% Marcaine was used for local anesthesia as needed. A transverse skin incision was made in the distal upper arm and carried down through the subcutaneous tissue. The brachial artery was identified and freed from the surrounding tissues. The cephalic vein fistulaThe basilic vein was felt to be the best outflow vein and dissected free and identified. A second skin incision was made near the distal forearm as a counter incision for the loop graft and continued through the subcutaneous tissue. The graft was tunneled using the tunneler to distal forearm to counter incision and than back up to antecubital fossa. The patient was heparinized, and the artery was clamped proximally and distally. The previous BC avf was transected just above the arterial anastamosis.   The graft was cut to the appropriate length and spatulated, and anastomosed to the artery/cuff of cephalic vein using a running 6-0 goretex

## 2023-08-25 NOTE — ANESTHESIA PRE PROCEDURE
3.9 3.7 3.6 3.7 3.8 3.8 4.0 3.8 4.0 3.9 3.9 4.3 3.7   Chloride 98 - 107 mmol/L 105 106 105 102 103 100 100 100 103 102 100 105 98   CO2 22 - 29 mmol/L 23 24 26 24 24 23 23 24 24 26 27 25 31 (H)   BUN,BUNPL 6 - 23 mg/dL 44 (H) 49 (H) 54 (H) 56 (H) 59 (H) 60 (H) 62 (H) 66 (H) 68 (H) 47 (H) 27 (H) 38 (H) 16   Creatinine 0.5 - 1.0 mg/dL 4.5 (H) 5.7 (H) 5.9 (H) 5.8 (H) 6.0 (H) 6.1 (H) 6.1 (H) 6.5 (H) 6.7 (H) 5.0 (H) 3.2 (H) 4.4 (H) 2.4 (H)   Anion Gap 7 - 16 mmol/L 14 14 11 12 11 14 14 13 13 11 8 8 9   Calcium, Ionized 1.15 - 1.33 mmol/L      1.07 (L) 1.11 (L) 1.13 (L) 1.12 (L)       Est, Glom Filt Rate >=60 mL/min/1.73 10 7 7 7 7 7 7 6 6 9 15 10 21   (H): Data is abnormally high  (L): Data is abnormally low    POC Tests: No results for input(s): POCGLU, POCNA, POCK, POCCL, POCBUN, POCHEMO, POCHCT in the last 72 hours.     Coags:   Lab Results   Component Value Date/Time    PROTIME 14.7 08/25/2023 09:30 AM    INR 1.4 08/25/2023 09:30 AM       HCG (If Applicable): No results found for: PREGTESTUR, PREGSERUM, HCG, HCGQUANT     ABGs: No results found for: PHART, PO2ART, ZCB1BSD, VKZ4BOC, BEART, D8ERDKJF     Type & Screen (If Applicable):  No results found for: LABABO, LABRH    Drug/Infectious Status (If Applicable):  No results found for: HIV, HEPCAB    COVID-19 Screening (If Applicable):   Lab Results   Component Value Date/Time    COVID19 Not Detected 03/17/2023 08:55 AM           Anesthesia Evaluation  Patient summary reviewed and Nursing notes reviewed no history of anesthetic complications:   Airway: Mallampati: III  TM distance: >3 FB   Neck ROM: full  Mouth opening: > = 3 FB   Dental: normal exam     Comment: None loose    Pulmonary:Negative Pulmonary ROS and normal exam  breath sounds clear to auscultation      (-) not a current smoker                           Cardiovascular:  Exercise tolerance: poor (<4 METS),   (+) hypertension:, valvular problems/murmurs (Mild AR): AS, murmur: Grade 2, Aortic, Plan      MAC and regional     ASA 4       Induction: intravenous. MIPS: Prophylactic antiemetics administered. Anesthetic plan and risks discussed with patient and child/children. Use of blood products discussed with patient and child/children whom consented to blood products. Plan discussed with CRNA and attending. Post-op pain plan if not by surgeon: single peripheral nerve block      DOS STAFF ADDENDUM:    Patient seen and chart reviewed. Physical exam and history updated as indicated. NPO status confirmed. Anesthesia options and plan discussed including risks benefits with patient/legal guardian and family as available. Concerns and questions addressed. Consent verbalized to proceed.   Anesthesia plan, options and intraoperative/postoperative concerns discussed with care team.    Gabby Marin MD  8/25/2023  11:31 AM        Gabby Marin MD  8/25/2023

## 2023-08-25 NOTE — PROGRESS NOTES
Nurse-to-nurse report called to [facility] [place] at [facility] transport transporting patient back to ECF. Discharge instructions sent with patient & given to patient verbally as well. Son present. Patient tolerated PO well.

## 2023-08-28 NOTE — ANESTHESIA POSTPROCEDURE EVALUATION
Department of Anesthesiology  Postprocedure Note    Patient: Ramone Benoit  MRN: [id number]  YOB: 1950  Date of evaluation: 8/28/2023      Procedure Summary     Date: 08/25/23 Room / Location: [facility] [place] [facility] 04 / [facility]    Anesthesia Start: 1234 Anesthesia Stop: 1459    Procedure: INSERTION LEFT UPPER EXTREMITY ARTERIOVENOUS GRAFT (Left: Arm Lower) Diagnosis:       End stage renal disease ([address])      (End stage renal disease ([address]) [N18.6])    Surgeons: Karthikeyan Castellon MD Responsible Provider: Guadalupe Laws MD    Anesthesia Type: MAC, regional ASA Status: 4          Anesthesia Type: No value filed.     Jt Phase I: Jt Score: 10    Jt Phase II: Jt Score: 9      Anesthesia Post Evaluation    Patient location during evaluation: PACU  Patient participation: complete - patient participated  Level of consciousness: awake and alert  Airway patency: patent  Nausea & Vomiting: no nausea and no vomiting  Complications: no  Cardiovascular status: blood pressure returned to baseline and hemodynamically stable  Respiratory status: acceptable and spontaneous ventilation  Hydration status: euvolemic  Multimodal analgesia pain management approach  Pain management: adequate

## 2023-09-01 ENCOUNTER — HOSPITAL ENCOUNTER (OUTPATIENT)
Dept: INTERVENTIONAL RADIOLOGY/VASCULAR | Age: 73
Discharge: HOME | End: 2023-09-01
Payer: MEDICARE

## 2023-09-01 DIAGNOSIS — R18.8 OTHER ASCITES: ICD-10-CM

## 2023-09-01 LAB
ALBUMIN FLD-MCNC: 1.7 G/DL
APPEARANCE FLD: CLEAR
BODY FLD TYPE: NORMAL
CLOT CHECK: NORMAL
COLOR FLD: YELLOW
MONOCYTES NFR FLD: 96 %
NEUTROPHILS NFR FLD: 4 %
PROT FLD-MCNC: 3.7 G/DL
RBC # FLD: <2000 CELLS/UL
SPECIMEN TYPE: NORMAL
SPECIMEN TYPE: NORMAL
WBC # FLD: 55 CELLS/UL

## 2023-09-01 PROCEDURE — 87205 SMEAR GRAM STAIN: CPT

## 2023-09-01 PROCEDURE — 87070 CULTURE OTHR SPECIMN AEROBIC: CPT

## 2023-09-01 PROCEDURE — 6360000002 HC RX W HCPCS: Performed by: INTERNAL MEDICINE

## 2023-09-01 PROCEDURE — 49083 ABD PARACENTESIS W/IMAGING: CPT

## 2023-09-01 PROCEDURE — 84157 ASSAY OF PROTEIN OTHER: CPT

## 2023-09-01 PROCEDURE — C1729 CATH, DRAINAGE: HCPCS

## 2023-09-01 PROCEDURE — P9047 ALBUMIN (HUMAN), 25%, 50ML: HCPCS | Performed by: INTERNAL MEDICINE

## 2023-09-01 PROCEDURE — 82042 OTHER SOURCE ALBUMIN QUAN EA: CPT

## 2023-09-01 PROCEDURE — 89051 BODY FLUID CELL COUNT: CPT

## 2023-09-01 RX ORDER — ALBUMIN (HUMAN) 12.5 G/50ML
50 SOLUTION INTRAVENOUS ONCE
Status: COMPLETED | OUTPATIENT
Start: 2023-09-01 | End: 2023-09-01

## 2023-09-01 RX ADMIN — ALBUMIN (HUMAN) 50 G: 0.25 INJECTION, SOLUTION INTRAVENOUS at 15:46

## 2023-09-01 NOTE — PROGRESS NOTES
Patient here for ultrasound guided paracentesis. Instructions given and questions answered. Consent signed. Abdomen scanned and marked under the guidance of procedural Radiologist.     1454  start procedure /61  76  16  99% on room air    1548  end procedure /60  84  16  98% on room air      8400  cc drained of clear yellow colored ascites fluid. Dry sterile dressing of folded 4 x 4 and tegaderm to RLQ     50  grams of IV Albumin given, per order    Cy Nehal for patient to be discharged back to SNF. Sample sent to lab per order. Please remove dressing after 24 hours. Note printed and sent with patient.

## 2023-09-03 LAB
MICROORGANISM SPEC CULT: ABNORMAL
MICROORGANISM/AGENT SPEC: ABNORMAL
SPECIMEN DESCRIPTION: ABNORMAL

## 2023-09-04 LAB
MICROORGANISM SPEC CULT: NO GROWTH
MICROORGANISM/AGENT SPEC: ABNORMAL
SPECIMEN DESCRIPTION: ABNORMAL

## 2023-09-08 NOTE — FLOWSHEET NOTE
03/15/23 1537   Vital Signs   BP (!) 110/50   Temp 98.1 °F (36.7 °C)   Heart Rate 70   Resp 16   Weight 160 lb 11.5 oz (72.9 kg)   Weight Method Bed scale   Percent Weight Change -0.68   Post-Hemodialysis Assessment   Post-Treatment Procedures Blood returned;Catheter capped, clamped with Citrate x 2 ports   Machine Disinfection Process Acid/Vinegar Clean;Bleach; Machine Absence of [employer]; Exterior Machine Disinfection   Rinseback Volume (ml) 300 ml   Blood Volume Processed (Liters) 28.5 l/min   Dialyzer Clearance Clear   Duration of Treatment (minutes) 120 minutes   Hemodialysis Intake (ml) 300 ml   Hemodialysis Output (ml) 800 ml   NET Removed (ml) 500   Tolerated Treatment Good   Time Off 1537   Patient Disposition Return to room BMI: BMI (kg/m2): 24.8 (09-04-23 @ 05:30)  HbA1c:   Glucose:   BP: 113/82 (09-08-23 @ 08:48) (113/82 - 114/60)  Lipid Panel:  BMI: BMI (kg/m2): 24.8 (09-04-23 @ 05:30)  HbA1c:   Glucose:   BP: 103/73 (09-12-23 @ 08:09) (103/73 - 118/62)  Lipid Panel:

## 2023-09-18 ENCOUNTER — TELEPHONE (OUTPATIENT)
Dept: VASCULAR SURGERY | Age: 73
End: 2023-09-18

## 2023-09-18 ENCOUNTER — OFFICE VISIT (OUTPATIENT)
Dept: VASCULAR SURGERY | Age: 73
End: 2023-09-18

## 2023-09-18 VITALS — HEIGHT: 62 IN | BODY MASS INDEX: 24.69 KG/M2

## 2023-09-18 DIAGNOSIS — N18.6 ENCOUNTER REGARDING VASCULAR ACCESS FOR DIALYSIS FOR END-STAGE RENAL DISEASE (HCC): Primary | ICD-10-CM

## 2023-09-18 DIAGNOSIS — Z99.2 ENCOUNTER REGARDING VASCULAR ACCESS FOR DIALYSIS FOR END-STAGE RENAL DISEASE (HCC): Primary | ICD-10-CM

## 2023-09-18 PROCEDURE — 99024 POSTOP FOLLOW-UP VISIT: CPT | Performed by: SURGERY

## 2023-09-18 NOTE — TELEPHONE ENCOUNTER
Notified Parth Casonor at [employer], ok to use graft, notify office when catheter ready to be removed. Faxed office note to [facility].

## 2023-09-18 NOTE — PROGRESS NOTES
9/18/2023    Susana Crews  1950    PCP : Zulema Cobian MD  Nephrologist : Dr Georgean Closs  ESRD T [facility] AT [facility]     Previous Vascular Procedures  2/10/23 L BC AVF     R IJ tunn hd cath   4/28/23 L BC AVF revision with artegraft patch of proximal cephalic vein   5/25/65 LUE fistulagram  Plasty of proximal cephalic vein with 0Q73 mustang   8/25/23 L Brachial Cephalic forearm loop graft - 4-7 mm acuseal      Patient returns for post operative evaluation. The patient denies any unexpected problems since the procedure. The wound is healing well. Denies left hand pain. Swelling has resolved.      PE  Gen NAD Awake and Alert  left Upper Extremity  AV graft forearm loop slightly pulsatile  + thrill in cephalic upper arm   Brachial 2+    Radial 1+  Ulnar biphasic     Palmar biphasic  Wound is clean, dry and intact  with no evidence of cellulitis or drainage  No deficits in sensation or motor     A/P Dialysis Access  left brachiocephalic AV graft forearm loop  wound is healing well  no evidence of steal syndrome  av access is ready for cannulation- the facility will be notified  The tunneled cathter will be removed once her am access is functioning well  I reviewed with the patient that normal activities can be resumed as tolerated      Celena Philip MD

## 2023-09-21 NOTE — PROGRESS NOTES
Attempted tx with pt, however pt refused therapy at this time. Pt had just returned from dialysis and is currently eating lunch. Son present in room. Pt to be D/C'd soon per nsg/family.  Colette Hollingsworth, [address] Imiquimod Counseling:  I discussed with the patient the risks of imiquimod including but not limited to erythema, scaling, itching, weeping, crusting, and pain.  Patient understands that the inflammatory response to imiquimod is variable from person to person and was educated regarded proper titration schedule.  If flu-like symptoms develop, patient knows to discontinue the medication and contact us.

## 2023-09-26 ENCOUNTER — TELEPHONE (OUTPATIENT)
Dept: VASCULAR SURGERY | Age: 73
End: 2023-09-26

## 2023-09-26 NOTE — TELEPHONE ENCOUNTER
(L) AVG inserted 8/25/23. Lalita from [facility] dialysis called stating it is clotted; pt has TDC.

## 2023-10-04 ENCOUNTER — PREP FOR PROCEDURE (OUTPATIENT)
Dept: VASCULAR SURGERY | Age: 73
End: 2023-10-04

## 2023-10-04 NOTE — TELEPHONE ENCOUNTER
Scheduled thrombectomy possible revision (L) AVG 10/13/23. Oliver at [facility] notified, message left on son, Dario Nolen', voicemail.

## 2023-10-09 ENCOUNTER — HOSPITAL ENCOUNTER (OUTPATIENT)
Dept: INTERVENTIONAL RADIOLOGY/VASCULAR | Age: 73
Discharge: HOME | End: 2023-10-09
Payer: COMMERCIAL

## 2023-10-09 ENCOUNTER — HOSPITAL ENCOUNTER (OUTPATIENT)
Age: 73
Discharge: HOME | End: 2023-10-09
Payer: COMMERCIAL

## 2023-10-09 ENCOUNTER — HOSPITAL ENCOUNTER (OUTPATIENT)
Age: 73
End: 2023-10-09
Payer: COMMERCIAL

## 2023-10-09 DIAGNOSIS — R18.8 OTHER ASCITES: ICD-10-CM

## 2023-10-09 LAB
BASOPHILS # BLD: 0 K/UL (ref 0–0.2)
BASOPHILS NFR BLD: 0 % (ref 0–2)
EKG ATRIAL RATE: 76 BPM
EKG P AXIS: 67 DEGREES
EKG P-R INTERVAL: 156 MS
EKG Q-T INTERVAL: 390 MS
EKG QRS DURATION: 86 MS
EKG QTC CALCULATION (BAZETT): 438 MS
EKG R AXIS: 37 DEGREES
EKG T AXIS: 63 DEGREES
EKG VENTRICULAR RATE: 76 BPM
EOSINOPHIL # BLD: 0 K/UL (ref 0.05–0.5)
EOSINOPHILS RELATIVE PERCENT: 0 % (ref 0–6)
ERYTHROCYTE [DISTWIDTH] IN BLOOD BY AUTOMATED COUNT: 17.1 % (ref 11.5–15)
HCT VFR BLD AUTO: 33.9 % (ref 34–48)
HGB BLD-MCNC: 9.3 G/DL (ref 11.5–15.5)
INR PPP: 1.3
LYMPHOCYTES NFR BLD: 0.42 K/UL (ref 1.5–4)
LYMPHOCYTES RELATIVE PERCENT: 14 % (ref 20–42)
MCH RBC QN AUTO: 21.1 PG (ref 26–35)
MCHC RBC AUTO-ENTMCNC: 27.4 G/DL (ref 32–34.5)
MCV RBC AUTO: 77 FL (ref 80–99.9)
MONOCYTES NFR BLD: 0.16 K/UL (ref 0.1–0.95)
MONOCYTES NFR BLD: 5 % (ref 2–12)
NEUTROPHILS NFR BLD: 81 % (ref 43–80)
NEUTS SEG NFR BLD: 2.43 K/UL (ref 1.8–7.3)
PLATELET, FLUORESCENCE: 105 K/UL (ref 130–450)
PMV BLD AUTO: 11.3 FL (ref 7–12)
PROTHROMBIN TIME: 15.2 SEC (ref 9.3–12.4)
RBC # BLD AUTO: 4.4 M/UL (ref 3.5–5.5)
RBC # BLD: ABNORMAL 10*6/UL
WBC OTHER # BLD: 3 K/UL (ref 4.5–11.5)

## 2023-10-09 PROCEDURE — 93005 ELECTROCARDIOGRAM TRACING: CPT | Performed by: NURSE PRACTITIONER

## 2023-10-09 PROCEDURE — 85610 PROTHROMBIN TIME: CPT

## 2023-10-09 PROCEDURE — 6360000002 HC RX W HCPCS: Performed by: INTERNAL MEDICINE

## 2023-10-09 PROCEDURE — P9047 ALBUMIN (HUMAN), 25%, 50ML: HCPCS | Performed by: INTERNAL MEDICINE

## 2023-10-09 PROCEDURE — C1729 CATH, DRAINAGE: HCPCS

## 2023-10-09 PROCEDURE — 49083 ABD PARACENTESIS W/IMAGING: CPT

## 2023-10-09 PROCEDURE — 85025 COMPLETE CBC W/AUTO DIFF WBC: CPT

## 2023-10-09 RX ORDER — ALBUMIN (HUMAN) 12.5 G/50ML
50 SOLUTION INTRAVENOUS ONCE
Status: COMPLETED | OUTPATIENT
Start: 2023-10-09 | End: 2023-10-09

## 2023-10-09 RX ADMIN — ALBUMIN (HUMAN) 50 G: 0.25 INJECTION, SOLUTION INTRAVENOUS at 10:18

## 2023-10-09 NOTE — PROGRESS NOTES
Patient here for ultrasound guided paracentesis. Instructions given and questions answered. Consent signed. Abdomen scanned and marked under the guidance of procedural Radiologist.     8428  start procedure /65 69 18 97% on room air    1014  end procedure /60 69 18 96% on room air      7250  cc drained of clear straw colored ascites fluid. Dry sterile dressing of folded 4 x 4 tegaderm to RLQ     Please removed dressing after 24 hours. 50  grams of IV Albumin given, per order    Told patients son that patient needed to go to lab for labwork before going back to the facility. Elisa Boo for patient to be discharged back to SNF. Progress note printed and sent back with patient.

## 2023-10-10 RX ORDER — CALCIUM ACETATE 667 MG/1
1 TABLET ORAL
COMMUNITY

## 2023-10-10 RX ORDER — ACETAMINOPHEN 325 MG/1
650 TABLET ORAL EVERY 4 HOURS PRN
COMMUNITY

## 2023-10-10 RX ORDER — MIDODRINE HYDROCHLORIDE 10 MG/1
10 TABLET ORAL
COMMUNITY

## 2023-10-10 NOTE — PROGRESS NOTES
[address]   PRE-ADMISSION TESTING GENERAL INSTRUCTIONS  [facility] Phone Number: [phone]      GENERAL INSTRUCTIONS:    [x] Antibacterial Soap Shower Night before or AM of surgery. [x] Do not wear makeup, lotions, powders, deodorant. [x] Nothing by mouth after midnight; including gum, candy, mints, or water. [x] You may brush your teeth, gargle, but do NOT swallow water. [x] No tobacco products, illegal drugs, or alcohol within 24 hours of your surgery. [x] Jewelry or valuables should not be brought to the hospital. All body and/or tongue piercing's must be removed prior to arriving to hospital. No contact lens or hair pins. PARKING INSTRUCTIONS:     [x] ARRIVAL DATE & TIME: 10/13/23 arrive no later than 1245. [x] Times are subject to change. We will contact you the business day before surgery if that were to occur. [x] Enter into the [employer]. Two people may accompany you. Masks are not required. [x] Parking Lot \"I\" is where you will park. It is located on the corner of [address] [place] [address] and [address]. The entrance is on [address]. Only one vehicle - per patient, is permitted in parking lot. Upon entering the parking lot, a voucher ticket will print. MEDICATION INSTRUCTIONS:    [x] Bring a complete list of your medications, please write the last time you took the medicine, give this list to the nurse in Pre-Op. [x] Take only the following medications the morning of surgery with 1-2 ounces of water: prilosec, luvox, and exelon. [x] Stop all herbal supplements and vitamins 5 days before surgery. Stop NSAIDS 7 days before surgery. [x] Follow physician instructions regarding any blood thinners you may be taking. WHAT TO EXPECT:     [x]  Delays may occur with surgery and staff will make a sincere effort to keep you informed of delays.   If any delays occur with your procedure, we apologize ahead of time for your inconvenience as we recognize

## 2023-10-10 NOTE — PROGRESS NOTES
Spoke with Alexis Box, nurse caring for patient at [facility] discussed following up regarding information needed which was faxed to the facility on 10/6 and 10/9. Alexis Box states someone from pre admission testing spoke with her yesterday regarding the above. She states she informed the Clinical Director. She request to please refax the requested information and she will follow up with the Clinical Director. Confirmed [phone] is the fax number she would like to be used. 9:49 am faxed the requested information to facility. 9:58 am Spoke with Dc Brown at [facility] discussed attempting to refax the above information however the fax is not going through. Requested if the facility has another fax number. She states they do not. She states requested faxed information was received yesterday and that Alexis Box is following up with the Clinical Director, she states she will have Alexis Box return the call if there is any issues regarding the requested faxed information.

## 2023-10-12 ENCOUNTER — ANESTHESIA EVENT (OUTPATIENT)
Dept: OPERATING ROOM | Age: 73
End: 2023-10-12
Payer: MEDICARE

## 2023-10-13 ENCOUNTER — ANESTHESIA (OUTPATIENT)
Dept: OPERATING ROOM | Age: 73
End: 2023-10-13
Payer: MEDICARE

## 2023-10-13 ENCOUNTER — HOSPITAL ENCOUNTER (OUTPATIENT)
Age: 73
Setting detail: OUTPATIENT SURGERY
Discharge: OTHER INSTITUTION | End: 2023-10-13
Attending: SURGERY | Admitting: SURGERY
Payer: COMMERCIAL

## 2023-10-13 VITALS
DIASTOLIC BLOOD PRESSURE: 70 MMHG | RESPIRATION RATE: 18 BRPM | OXYGEN SATURATION: 95 % | HEIGHT: 62 IN | BODY MASS INDEX: 23 KG/M2 | HEART RATE: 72 BPM | WEIGHT: 125 LBS | SYSTOLIC BLOOD PRESSURE: 120 MMHG | TEMPERATURE: 98 F

## 2023-10-13 DIAGNOSIS — Z99.2 ENCOUNTER REGARDING VASCULAR ACCESS FOR DIALYSIS FOR END-STAGE RENAL DISEASE (HCC): ICD-10-CM

## 2023-10-13 DIAGNOSIS — N18.6 ENCOUNTER REGARDING VASCULAR ACCESS FOR DIALYSIS FOR END-STAGE RENAL DISEASE (HCC): ICD-10-CM

## 2023-10-13 DIAGNOSIS — Z01.812 PRE-OPERATIVE LABORATORY EXAMINATION: Primary | ICD-10-CM

## 2023-10-13 LAB
ABO + RH BLD: NORMAL
ANION GAP SERPL CALCULATED.3IONS-SCNC: 13 MMOL/L (ref 7–16)
ARM BAND NUMBER: NORMAL
BLOOD BANK SAMPLE EXPIRATION: NORMAL
BLOOD GROUP ANTIBODIES SERPL: NEGATIVE
BUN SERPL-MCNC: 26 MG/DL (ref 6–23)
CALCIUM SERPL-MCNC: 8.4 MG/DL (ref 8.6–10.2)
CHLORIDE SERPL-SCNC: 93 MMOL/L (ref 98–107)
CO2 SERPL-SCNC: 30 MMOL/L (ref 22–29)
CREAT SERPL-MCNC: 3.6 MG/DL (ref 0.5–1)
ERYTHROCYTE [DISTWIDTH] IN BLOOD BY AUTOMATED COUNT: 17.2 % (ref 11.5–15)
GFR SERPL CREATININE-BSD FRML MDRD: 13 ML/MIN/1.73M2
GLUCOSE SERPL-MCNC: 89 MG/DL (ref 74–99)
HCT VFR BLD AUTO: 41.6 % (ref 34–48)
HGB BLD-MCNC: 11.4 G/DL (ref 11.5–15.5)
INR PPP: 1.3
MCH RBC QN AUTO: 21.5 PG (ref 26–35)
MCHC RBC AUTO-ENTMCNC: 27.4 G/DL (ref 32–34.5)
MCV RBC AUTO: 78.5 FL (ref 80–99.9)
PLATELET, FLUORESCENCE: 144 K/UL (ref 130–450)
PMV BLD AUTO: 10.3 FL (ref 7–12)
POTASSIUM SERPL-SCNC: 4.1 MMOL/L (ref 3.5–5)
PROTHROMBIN TIME: 14.3 SEC (ref 9.3–12.4)
RBC # BLD AUTO: 5.3 M/UL (ref 3.5–5.5)
SODIUM SERPL-SCNC: 136 MMOL/L (ref 132–146)
WBC OTHER # BLD: 3.8 K/UL (ref 4.5–11.5)

## 2023-10-13 PROCEDURE — 86901 BLOOD TYPING SEROLOGIC RH(D): CPT

## 2023-10-13 PROCEDURE — 6360000002 HC RX W HCPCS: Performed by: ANESTHESIOLOGY

## 2023-10-13 PROCEDURE — 85610 PROTHROMBIN TIME: CPT

## 2023-10-13 PROCEDURE — 2709999900 HC NON-CHARGEABLE SUPPLY: Performed by: SURGERY

## 2023-10-13 PROCEDURE — 85027 COMPLETE CBC AUTOMATED: CPT

## 2023-10-13 PROCEDURE — 2500000003 HC RX 250 WO HCPCS: Performed by: SURGERY

## 2023-10-13 PROCEDURE — 6360000002 HC RX W HCPCS: Performed by: SURGERY

## 2023-10-13 PROCEDURE — 6360000002 HC RX W HCPCS

## 2023-10-13 PROCEDURE — 2580000003 HC RX 258: Performed by: NURSE PRACTITIONER

## 2023-10-13 PROCEDURE — 3600000012 HC SURGERY LEVEL 2 ADDTL 15MIN: Performed by: SURGERY

## 2023-10-13 PROCEDURE — A4217 STERILE WATER/SALINE, 500 ML: HCPCS | Performed by: SURGERY

## 2023-10-13 PROCEDURE — 36830 ARTERY-VEIN NONAUTOGRAFT: CPT | Performed by: SURGERY

## 2023-10-13 PROCEDURE — 7100000011 HC PHASE II RECOVERY - ADDTL 15 MIN: Performed by: SURGERY

## 2023-10-13 PROCEDURE — 86850 RBC ANTIBODY SCREEN: CPT

## 2023-10-13 PROCEDURE — 6360000002 HC RX W HCPCS: Performed by: NURSE PRACTITIONER

## 2023-10-13 PROCEDURE — 3600000002 HC SURGERY LEVEL 2 BASE: Performed by: SURGERY

## 2023-10-13 PROCEDURE — 80048 BASIC METABOLIC PNL TOTAL CA: CPT

## 2023-10-13 PROCEDURE — 64415 NJX AA&/STRD BRCH PLXS IMG: CPT | Performed by: ANESTHESIOLOGY

## 2023-10-13 PROCEDURE — 86900 BLOOD TYPING SEROLOGIC ABO: CPT

## 2023-10-13 PROCEDURE — 3700000000 HC ANESTHESIA ATTENDED CARE: Performed by: SURGERY

## 2023-10-13 PROCEDURE — C1768 GRAFT, VASCULAR: HCPCS | Performed by: SURGERY

## 2023-10-13 PROCEDURE — 3700000001 HC ADD 15 MINUTES (ANESTHESIA): Performed by: SURGERY

## 2023-10-13 PROCEDURE — 2580000003 HC RX 258: Performed by: ANESTHESIOLOGY

## 2023-10-13 PROCEDURE — 2580000003 HC RX 258: Performed by: SURGERY

## 2023-10-13 PROCEDURE — 7100000010 HC PHASE II RECOVERY - FIRST 15 MIN: Performed by: SURGERY

## 2023-10-13 DEVICE — GORE ACUSEAL VASCULAR GRAFT 4-7MMX45CM TAPERED HEPARIN
Type: IMPLANTABLE DEVICE | Site: ARM | Status: FUNCTIONAL
Brand: GORE ACUSEAL VASCULAR GRAFT

## 2023-10-13 RX ORDER — HEPARIN SODIUM 1000 [USP'U]/ML
INJECTION, SOLUTION INTRAVENOUS; SUBCUTANEOUS PRN
Status: DISCONTINUED | OUTPATIENT
Start: 2023-10-13 | End: 2023-10-13 | Stop reason: SDUPTHER

## 2023-10-13 RX ORDER — SODIUM CHLORIDE 0.9 % (FLUSH) 0.9 %
5-40 SYRINGE (ML) INJECTION EVERY 12 HOURS SCHEDULED
Status: DISCONTINUED | OUTPATIENT
Start: 2023-10-13 | End: 2023-10-13 | Stop reason: HOSPADM

## 2023-10-13 RX ORDER — ALBUTEROL SULFATE 90 UG/1
2 AEROSOL, METERED RESPIRATORY (INHALATION) EVERY 6 HOURS PRN
Status: DISCONTINUED | OUTPATIENT
Start: 2023-10-13 | End: 2023-10-13 | Stop reason: HOSPADM

## 2023-10-13 RX ORDER — SODIUM CHLORIDE 0.9 % (FLUSH) 0.9 %
5-40 SYRINGE (ML) INJECTION PRN
Status: DISCONTINUED | OUTPATIENT
Start: 2023-10-13 | End: 2023-10-13 | Stop reason: HOSPADM

## 2023-10-13 RX ORDER — FENTANYL CITRATE 50 UG/ML
INJECTION, SOLUTION INTRAMUSCULAR; INTRAVENOUS PRN
Status: DISCONTINUED | OUTPATIENT
Start: 2023-10-13 | End: 2023-10-13 | Stop reason: SDUPTHER

## 2023-10-13 RX ORDER — SODIUM CHLORIDE 9 MG/ML
INJECTION, SOLUTION INTRAVENOUS PRN
Status: DISCONTINUED | OUTPATIENT
Start: 2023-10-13 | End: 2023-10-13 | Stop reason: HOSPADM

## 2023-10-13 RX ORDER — FENTANYL CITRATE 50 UG/ML
25 INJECTION, SOLUTION INTRAMUSCULAR; INTRAVENOUS ONCE
Status: COMPLETED | OUTPATIENT
Start: 2023-10-13 | End: 2023-10-13

## 2023-10-13 RX ORDER — DEXAMETHASONE SODIUM PHOSPHATE 10 MG/ML
4 INJECTION, SOLUTION INTRAMUSCULAR; INTRAVENOUS ONCE
Status: DISCONTINUED | OUTPATIENT
Start: 2023-10-13 | End: 2023-10-13 | Stop reason: HOSPADM

## 2023-10-13 RX ORDER — ROPIVACAINE HYDROCHLORIDE 5 MG/ML
INJECTION, SOLUTION EPIDURAL; INFILTRATION; PERINEURAL
Status: COMPLETED | OUTPATIENT
Start: 2023-10-13 | End: 2023-10-13

## 2023-10-13 RX ORDER — PHENYLEPHRINE HCL IN 0.9% NACL 1 MG/10 ML
SYRINGE (ML) INTRAVENOUS PRN
Status: DISCONTINUED | OUTPATIENT
Start: 2023-10-13 | End: 2023-10-13 | Stop reason: SDUPTHER

## 2023-10-13 RX ORDER — PROPOFOL 10 MG/ML
INJECTION, EMULSION INTRAVENOUS PRN
Status: DISCONTINUED | OUTPATIENT
Start: 2023-10-13 | End: 2023-10-13 | Stop reason: SDUPTHER

## 2023-10-13 RX ORDER — ROPIVACAINE HYDROCHLORIDE 5 MG/ML
30 INJECTION, SOLUTION EPIDURAL; INFILTRATION; PERINEURAL ONCE
Status: COMPLETED | OUTPATIENT
Start: 2023-10-13 | End: 2023-10-13

## 2023-10-13 RX ORDER — FENTANYL CITRATE 50 UG/ML
100 INJECTION, SOLUTION INTRAMUSCULAR; INTRAVENOUS ONCE
Status: COMPLETED | OUTPATIENT
Start: 2023-10-13 | End: 2023-10-13

## 2023-10-13 RX ORDER — MIDAZOLAM HYDROCHLORIDE 2 MG/2ML
2 INJECTION, SOLUTION INTRAMUSCULAR; INTRAVENOUS EVERY 10 MIN PRN
Status: DISCONTINUED | OUTPATIENT
Start: 2023-10-13 | End: 2023-10-13 | Stop reason: HOSPADM

## 2023-10-13 RX ORDER — SODIUM CHLORIDE 9 MG/ML
INJECTION, SOLUTION INTRAVENOUS CONTINUOUS
Status: DISCONTINUED | OUTPATIENT
Start: 2023-10-13 | End: 2023-10-13 | Stop reason: HOSPADM

## 2023-10-13 RX ORDER — OXYCODONE HYDROCHLORIDE AND ACETAMINOPHEN 5; 325 MG/1; MG/1
1 TABLET ORAL EVERY 6 HOURS PRN
Qty: 28 TABLET | Refills: 0 | Status: SHIPPED | OUTPATIENT
Start: 2023-10-13 | End: 2023-10-20

## 2023-10-13 RX ADMIN — PROPOFOL 30 MG: 10 INJECTION, EMULSION INTRAVENOUS at 15:46

## 2023-10-13 RX ADMIN — Medication 100 MCG: at 15:31

## 2023-10-13 RX ADMIN — FENTANYL CITRATE 25 MCG: 50 INJECTION, SOLUTION INTRAMUSCULAR; INTRAVENOUS at 14:54

## 2023-10-13 RX ADMIN — FENTANYL CITRATE 25 MCG: 50 INJECTION, SOLUTION INTRAMUSCULAR; INTRAVENOUS at 15:45

## 2023-10-13 RX ADMIN — ROPIVACAINE HYDROCHLORIDE 25 ML: 5 INJECTION, SOLUTION EPIDURAL; INFILTRATION; PERINEURAL at 13:37

## 2023-10-13 RX ADMIN — HEPARIN SODIUM 6000 UNITS: 1000 INJECTION, SOLUTION INTRAVENOUS; SUBCUTANEOUS at 15:22

## 2023-10-13 RX ADMIN — MIDAZOLAM 1 MG: 1 INJECTION INTRAMUSCULAR; INTRAVENOUS at 13:33

## 2023-10-13 RX ADMIN — FENTANYL CITRATE 50 MCG: 50 INJECTION INTRAMUSCULAR; INTRAVENOUS at 13:33

## 2023-10-13 RX ADMIN — Medication 100 MCG: at 15:26

## 2023-10-13 RX ADMIN — CEFAZOLIN 2000 MG: 2 INJECTION, POWDER, FOR SOLUTION INTRAMUSCULAR; INTRAVENOUS at 14:58

## 2023-10-13 RX ADMIN — Medication 100 MCG: at 15:48

## 2023-10-13 RX ADMIN — ROPIVACAINE HYDROCHLORIDE 25 ML: 5 INJECTION EPIDURAL; INFILTRATION; PERINEURAL at 13:35

## 2023-10-13 RX ADMIN — SODIUM CHLORIDE: 9 INJECTION, SOLUTION INTRAVENOUS at 14:50

## 2023-10-13 RX ADMIN — PROPOFOL 50 MCG/KG/MIN: 10 INJECTION, EMULSION INTRAVENOUS at 14:55

## 2023-10-13 RX ADMIN — SODIUM CHLORIDE, PRESERVATIVE FREE 10 ML: 5 INJECTION INTRAVENOUS at 12:04

## 2023-10-13 RX ADMIN — PROPOFOL 30 MG: 10 INJECTION, EMULSION INTRAVENOUS at 14:54

## 2023-10-13 ASSESSMENT — PAIN - FUNCTIONAL ASSESSMENT: PAIN_FUNCTIONAL_ASSESSMENT: 0-10

## 2023-10-13 ASSESSMENT — LIFESTYLE VARIABLES: SMOKING_STATUS: 0

## 2023-10-13 ASSESSMENT — PAIN SCALES - GENERAL
PAINLEVEL_OUTOF10: 0
PAINLEVEL_OUTOF10: 0

## 2023-10-13 NOTE — ANESTHESIA PRE PROCEDURE
Applicable): No results found for: \"PREGTESTUR\", \"PREGSERUM\", \"HCG\", \"HCGQUANT\"     ABGs: No results found for: \"PHART\", \"PO2ART\", \"EGZ7GSM\", \"LLS9NVC\", \"BEART\", \"J9PKEREY\"     Type & Screen (If Applicable):  No results found for: \"LABABO\", \"LABRH\"    Drug/Infectious Status (If Applicable):  No results found for: \"HIV\", \"HEPCAB\"    COVID-19 Screening (If Applicable):   Lab Results   Component Value Date/Time    COVID19 Not Detected 03/17/2023 08:55 AM           Anesthesia Evaluation  Patient summary reviewed and Nursing notes reviewed no history of anesthetic complications:   Airway: Mallampati: III  TM distance: >3 FB   Neck ROM: full  Mouth opening: > = 3 FB   Dental: normal exam     Comment: None loose    Pulmonary:Negative Pulmonary ROS and normal exam  breath sounds clear to auscultation      (-) not a current smoker                           Cardiovascular:  Exercise tolerance: poor (<4 METS),   (+) hypertension:, valvular problems/murmurs (Mild AR): AS, murmur: Grade 2, Aortic, hyperlipidemia      ECG reviewed  Rhythm: regular  Rate: normal  Echocardiogram reviewed         Beta Blocker:  Not on Beta Blocker      ROS comment: EKG 03/21/2023  Normal sinus rhythm  Cannot rule out Anterior infarct , age undetermined  Abnormal ECG  No previous ECGs available  Confirmed by Karlo Garrett ([id number]) on 3/23/2023 10:09:12 PM    Specimen Collected: 03/21/23 23:18 EDT      2D Echocardiogram 03/08/2023   Summary   Normal left ventricular chamber size. Normal left ventricular systolic function, EF 40%. Mild left ventricular concentric hypertrophy noted. Stage II diastolic dysfunction. Left atrium is enlarged. Increased left atrial volume. Interatrial septum not well visualized but appears intact. Normal right ventricle size and function. There is mild mitral regurgitation. No mitral valve prolapse. Mild aortic stenosis - Mean gradient 18mmHg, peak 31mmHg, SELINA 1.7cm2 by   VTI, 1.6cm2 by Vmax, DLI 0.52.

## 2023-10-13 NOTE — ANESTHESIA PROCEDURE NOTES
Peripheral Block    Patient location during procedure: pre-op  Reason for block: post-op pain management and at surgeon's request  Start time: 10/13/2023 1:35 PM  End time: 10/13/2023 1:40 PM  Staffing  Performed: anesthesiologist   Anesthesiologist: Gilbert Terry MD  Performed by: Gilbert Terry MD  Authorized by: Gilbert Terry MD    Preanesthetic Checklist  Completed: patient identified, IV checked, site marked, risks and benefits discussed, surgical/procedural consents, equipment checked, pre-op evaluation, timeout performed, anesthesia consent given, oxygen available, monitors applied/VS acknowledged, fire risk safety assessment completed and verbalized and blood product R/B/A discussed and consented  Peripheral Block   Patient position: supine  Prep: ChloraPrep  Provider prep: mask and sterile gloves  Patient monitoring: cardiac monitor, continuous pulse ox, frequent blood pressure checks and IV access  Block type: Brachial plexus  Supraclavicular  Laterality: left  Injection technique: single-shot  Guidance: ultrasound guided    Needle   Needle type: combined needle/nerve stimulator   Needle gauge: 22 G  Needle localization: anatomical landmarks and ultrasound guidance  Needle length: 5 cm  Assessment   Injection assessment: negative aspiration for heme, no paresthesia on injection, local visualized surrounding nerve on ultrasound and no intravascular symptoms  Paresthesia pain: none  Slow fractionated injection: yes  Hemodynamics: stable  Real-time US image taken/store: yes  Outcomes: uncomplicated and patient tolerated procedure well    Additional Notes  Time out performed. Local anesthetic injected easily in incremental doses and without paresthesia.   Well tolerated  Medications Administered  ropivacaine (NAROPIN) injection 0.5% - Perineural   25 mL - 10/13/2023 1:35:00 PM

## 2023-10-13 NOTE — ANESTHESIA POSTPROCEDURE EVALUATION
Department of Anesthesiology  Postprocedure Note    Patient: Shira Saavedra  MRN: [id number]  YOB: 1950  Date of evaluation: 10/13/2023      Procedure Summary     Date: 10/13/23 Room / Location: [facility] OR 03 / [facility]    Anesthesia Start: 0682 Anesthesia Stop: 9426    Procedure: THROMBECTOMY POSSIBLE REVISION AV GRAFT LEFT ARM/ FACILITY (Left: Arm Lower) Diagnosis:       End stage renal disease (HCC)      (End stage renal disease ([address]) [N18.6])    Surgeons: Ollen Simmonds, MD Responsible Provider: Arslan Cheung MD    Anesthesia Type: MAC, Regional ASA Status: 4          Anesthesia Type: MAC, Regional    Jt Phase I: Jt Score: 10    Jt Phase II: Jt Score: 10      Anesthesia Post Evaluation    Patient location during evaluation: PACU  Patient participation: complete - patient participated  Level of consciousness: awake and alert  Airway patency: patent  Nausea & Vomiting: no nausea and no vomiting  Complications: no  Cardiovascular status: blood pressure returned to baseline and hemodynamically stable  Respiratory status: acceptable and spontaneous ventilation  Hydration status: euvolemic  Multimodal analgesia pain management approach  Pain management: adequate

## 2023-10-13 NOTE — PROGRESS NOTES
Nurse to nurse called to [address] , informed Evaristo Box will pick patient up at 5:30, patients son request for patient to have dinner tray ordered or saved [facility] informed.

## 2023-10-13 NOTE — OP NOTE
Operative Note      Patient: Juan M Gray  YOB: 1950  MRN: [id number]    Date of Procedure: 10/13/2023    Pre-Op Diagnosis Codes:     * End stage renal disease ([address]) [N18.6]    Post-Op Diagnosis: Same       Procedure  L UE Brach Ax AV graft (acuseal 4-7 mm)    Surgeon(s):  Lucrecia Navarro MD    Assistant:   Surgical Assistant: Shelly Donato    Anesthesia: Regional    Estimated Blood Loss (mL): less than 50     Complications: None    Specimens:   * No specimens in log *    Implants:  Implant Name Type Inv. Item Serial No.  Lot No. LRB No. Used Action   GRAFT VASC L45CM ID4-7MM TAPR ACUSEAL - [id number] Vascular grafts GRAFT VASC L45CM ID4-7MM TAPR ACUSEAL [id number]  [employer] [id number] Left 1 Implanted     DESCRIPTION OF PROCEDURE: The patient was identified and the procedure was confirmed. The left arm was prepped and draped in the usual sterile fashion. Lidocaine 1% mixed with 0.25% Marcaine was used for local anesthesia. Original plan for thrombectomy was decided against as there was significant amount of thrombus in cephalic vein outflow. A transverse skin incision was made in the distal upper arm and carried down through the subcutaneous tissue. The brachial artery was identified and freed from the surrounding tissues. A second skin incision was made near the axilla and continued through the subcutaneous tissue. The axillary vein was identified and freed from the surrounding tissues. The graft was then marked for orientation and pulled through a subcutaneous tunnel using the Jackie-Wick tunneler. The patient was heparinized, and the artery was clamped proximally and distally, and the longitudinal arteriotomy measuring 5 mm was made. The graft was cut to the appropriate length and spatulated, and anastomosed to the side of the artery using a running 6-0 goretex suture. After completing the anastomosis, the clamps were released.   The vein

## 2023-10-13 NOTE — H&P
Vascular Surgery History & Physical Exam      Chief Complaint: L AVG unable to be used    HISTORY OF PRESENT ILLNESS:                The patient is a 74 y.o. female who presents to the hospital for elective L AVG thrombectomy. She states she saw Dr. Sally Tafoya in the office a few weeks ago and was cleared to use the graft and that it worked in dialysis a few times and then stopped working. She has been getting dialysis through a catheter, last session was yesterday. She denies any changes since the last office visit.     IMPRESSION:   Active Hospital Problems    Diagnosis     End stage renal disease ([address]) [N18.6]        PLAN:     - left AVG thrombectomy possible revision of graft with Dr. Sally Tafoya   - anticipate discharge after the procedure      Past Medical History:   Diagnosis Date    Anemia     iron deficiency    Cirrhosis of liver ([facility])     COVID-19     Depression     Hemodialysis patient ([facility])     Hypertension     Iron deficiency anemia, unspecified     Kidney failure     Thyroid disease         Past Surgical History:   Procedure Laterality Date    CT BIOPSY RENAL  11/10/2022    CT BIOPSY RENAL 11/10/2022 Juan Boo MD [facility]    DIALYSIS FISTULA CREATION Left 02/10/2023    CREATION ARTERIOVENOUS FISTULA LEFT ARM performed by Emanuel Verma MD at [facility] 04/28/2023    REVISION OF LEFT ARM ARTERIOVENOUS FISTULA performed by Emanuel Verma MD at [facility]  07/21/2023    Dr. Yin Kimbrough- Danielle Coca N/A 03/14/2023    EGD ESOPHAGOGASTRODUODENOSCOPY performed by Juan M Salcedo MD at [address] [place] [address] Left 8/25/2023    INSERTION LEFT UPPER EXTREMITY ARTERIOVENOUS GRAFT performed by Emanuel Verma MD at [address] [place] [address]       Current Medications:     Current Facility-Administered Medications:     0.9 % sodium chloride infusion, , IntraVENous, Continuous, Cindy Colin, APRN - CNP    sodium chloride flush Strain: Not on file   Food Insecurity: Not on file   Transportation Needs: Not on file   Physical Activity: Not on file   Stress: Not on file   Social Connections: Not on file   Intimate Partner Violence: Not on file   Housing Stability: Not on file        History reviewed. No pertinent family history. REVIEW OF SYSTEMS:  The chart was reviewed. PHYSICAL EXAM:    There were no vitals filed for this visit. CONSTITUTIONAL:  awake, alert, cooperative, no apparent distress, and appears stated age  NECK:  supple, symmetrical  LUNGS:  no increased work of breathing, good air exchange   CARDIOVASCULAR:  regular rate and rhythm and pulses 2 plus all extermities bilaterally. Graft present in left forearm, palpable pulsatile flow  ABDOMEN:  soft, non-distended and non-tender    LABS:    Lab Results   Component Value Date    WBC 3.0 (L) 10/09/2023    HGB 9.3 (L) 10/09/2023    HCT 33.9 (L) 10/09/2023     (L) 08/25/2023    PROTIME 15.2 (H) 10/09/2023    INR 1.3 10/09/2023    K 4.6 08/25/2023    BUN 31 (H) 08/25/2023    CREATININE 3.8 (H) 08/25/2023       RADIOLOGY:     Pt seen and examined  L UE forearm loop graft is thrombosed  Discussed with patient thrombectomy, revision vs. New upper arm graft    I reviewed the procedure with the patient and family as available. I discussed the procedure, risks, benefits, complications, and alternatives of the procedure. They understand and consent.   All questions were answered    Zoë Solano MD

## 2023-11-15 ENCOUNTER — PREP FOR PROCEDURE (OUTPATIENT)
Dept: VASCULAR SURGERY | Age: 73
End: 2023-11-15

## 2023-11-15 ENCOUNTER — TELEPHONE (OUTPATIENT)
Dept: VASCULAR SURGERY | Age: 73
End: 2023-11-15

## 2023-11-15 NOTE — TELEPHONE ENCOUNTER
Spoke with Lynn at dialysis center regarding scheduling catheter removal.  Scheduled surgery on Friday, 12-15-23. Report to . E's at 10:00 am. NPO after midnight except for heart and BP meds with sips of water. Kaleigh Zaid will give nursing home surgery information and instructions.

## 2023-11-16 ENCOUNTER — TELEPHONE (OUTPATIENT)
Dept: VASCULAR SURGERY | Age: 73
End: 2023-11-16

## 2023-11-16 NOTE — TELEPHONE ENCOUNTER
Spoke with Faviola Hansen from dialysis center. He is requesting to move up catheter removal. Rescheduled from 12-15-23 to 12-5-23. He will instruct nursing home.

## 2023-11-29 RX ORDER — LEVOTHYROXINE SODIUM 0.1 MG/1
TABLET ORAL
COMMUNITY
Start: 2023-11-15 | End: 2023-11-29

## 2023-11-29 NOTE — PROGRESS NOTES
[address] [place] [address]   PRE-ADMISSION TESTING GENERAL INSTRUCTIONS  PAT Phone Number: [phone]       Instructions faxed to [facility] ([phone]) on 11/23/2023. GENERAL INSTRUCTIONS:     [x] Antibacterial Soap Shower Night before and/or AM of surgery. [x] Do not wear makeup, lotions, powders, deodorant. [x] Nothing by mouth after midnight; including gum, candy, mints, or water. [x] Jewelry or valuables should not be brought to the hospital. All body and/or tongue piercing's must be removed prior to arriving to hospital. No contact lens or hair pins. [x] Bring insurance card and photo ID. PARKING INSTRUCTIONS:      [x] ARRIVAL DATE & TIME: 12/5/23 @ 7:00AM  [x] Times are subject to change. We will contact you the business day before surgery if that were to occur. [x] Enter into the [employer]. Two people may accompany you. Masks are not required. [x] Parking Lot \"I\" is where you will park. It is located on the corner of [address] [place] [address] and [address] [place] [address]. The entrance is on [address] [place] [address]. Only one vehicle - per patient, is permitted in parking lot. Upon entering the parking lot, a voucher ticket will print. MEDICATION INSTRUCTIONS:    [x] Take only the following medications the morning of surgery with 1-2 ounces of water: fluvoxamine (Luvox), rivastigmine (Exelon) and omeprazole (Prilosec). [x] Stop all herbal supplements and vitamins 5 days before surgery. Stop NSAIDS 7 days before surgery.

## 2023-12-04 ENCOUNTER — ANESTHESIA EVENT (OUTPATIENT)
Dept: OPERATING ROOM | Age: 73
End: 2023-12-04
Payer: COMMERCIAL

## 2023-12-05 ENCOUNTER — HOSPITAL ENCOUNTER (OUTPATIENT)
Age: 73
Setting detail: OUTPATIENT SURGERY
Discharge: HOME | End: 2023-12-05
Attending: SURGERY | Admitting: SURGERY
Payer: COMMERCIAL

## 2023-12-05 ENCOUNTER — ANESTHESIA (OUTPATIENT)
Dept: OPERATING ROOM | Age: 73
End: 2023-12-05
Payer: COMMERCIAL

## 2023-12-05 VITALS
RESPIRATION RATE: 18 BRPM | WEIGHT: 118 LBS | HEART RATE: 78 BPM | SYSTOLIC BLOOD PRESSURE: 114 MMHG | TEMPERATURE: 97.5 F | OXYGEN SATURATION: 94 % | HEIGHT: 62 IN | BODY MASS INDEX: 21.71 KG/M2 | DIASTOLIC BLOOD PRESSURE: 54 MMHG

## 2023-12-05 LAB
ANION GAP SERPL CALCULATED.3IONS-SCNC: 21 MMOL/L (ref 7–16)
BUN SERPL-MCNC: 58 MG/DL (ref 6–23)
CALCIUM SERPL-MCNC: 7.9 MG/DL (ref 8.6–10.2)
CHLORIDE SERPL-SCNC: 101 MMOL/L (ref 98–107)
CO2 SERPL-SCNC: 21 MMOL/L (ref 22–29)
CREAT SERPL-MCNC: 5.5 MG/DL (ref 0.5–1)
ERYTHROCYTE [DISTWIDTH] IN BLOOD BY AUTOMATED COUNT: 17.4 % (ref 11.5–15)
GFR SERPL CREATININE-BSD FRML MDRD: 8 ML/MIN/1.73M2
GLUCOSE SERPL-MCNC: 95 MG/DL (ref 74–99)
HCT VFR BLD AUTO: 36.5 % (ref 34–48)
HGB BLD-MCNC: 10.3 G/DL (ref 11.5–15.5)
INR PPP: 1.4
MCH RBC QN AUTO: 22.2 PG (ref 26–35)
MCHC RBC AUTO-ENTMCNC: 28.2 G/DL (ref 32–34.5)
MCV RBC AUTO: 78.5 FL (ref 80–99.9)
PLATELET, FLUORESCENCE: 106 K/UL (ref 130–450)
PMV BLD AUTO: 10.5 FL (ref 7–12)
POTASSIUM SERPL-SCNC: 5.7 MMOL/L (ref 3.5–5)
PROTHROMBIN TIME: 15.1 SEC (ref 9.3–12.4)
RBC # BLD AUTO: 4.65 M/UL (ref 3.5–5.5)
SODIUM SERPL-SCNC: 143 MMOL/L (ref 132–146)
WBC OTHER # BLD: 4 K/UL (ref 4.5–11.5)

## 2023-12-05 PROCEDURE — 2500000003 HC RX 250 WO HCPCS: Performed by: SURGERY

## 2023-12-05 PROCEDURE — 85610 PROTHROMBIN TIME: CPT

## 2023-12-05 PROCEDURE — 36589 REMOVAL TUNNELED CV CATH: CPT | Performed by: SURGERY

## 2023-12-05 PROCEDURE — 6360000002 HC RX W HCPCS: Performed by: SURGERY

## 2023-12-05 PROCEDURE — 7100000011 HC PHASE II RECOVERY - ADDTL 15 MIN: Performed by: SURGERY

## 2023-12-05 PROCEDURE — 85027 COMPLETE CBC AUTOMATED: CPT

## 2023-12-05 PROCEDURE — 3600000002 HC SURGERY LEVEL 2 BASE: Performed by: SURGERY

## 2023-12-05 PROCEDURE — 3700000001 HC ADD 15 MINUTES (ANESTHESIA): Performed by: SURGERY

## 2023-12-05 PROCEDURE — 2580000003 HC RX 258: Performed by: NURSE PRACTITIONER

## 2023-12-05 PROCEDURE — 80048 BASIC METABOLIC PNL TOTAL CA: CPT

## 2023-12-05 PROCEDURE — 7100000010 HC PHASE II RECOVERY - FIRST 15 MIN: Performed by: SURGERY

## 2023-12-05 PROCEDURE — 6360000002 HC RX W HCPCS: Performed by: NURSE PRACTITIONER

## 2023-12-05 PROCEDURE — 3700000000 HC ANESTHESIA ATTENDED CARE: Performed by: SURGERY

## 2023-12-05 PROCEDURE — 2709999900 HC NON-CHARGEABLE SUPPLY: Performed by: SURGERY

## 2023-12-05 PROCEDURE — 6360000002 HC RX W HCPCS

## 2023-12-05 PROCEDURE — 3600000012 HC SURGERY LEVEL 2 ADDTL 15MIN: Performed by: SURGERY

## 2023-12-05 RX ORDER — FENTANYL CITRATE 50 UG/ML
INJECTION, SOLUTION INTRAMUSCULAR; INTRAVENOUS PRN
Status: DISCONTINUED | OUTPATIENT
Start: 2023-12-05 | End: 2023-12-05 | Stop reason: SDUPTHER

## 2023-12-05 RX ORDER — SODIUM CHLORIDE 9 MG/ML
INJECTION, SOLUTION INTRAVENOUS PRN
Status: DISCONTINUED | OUTPATIENT
Start: 2023-12-05 | End: 2023-12-05 | Stop reason: HOSPADM

## 2023-12-05 RX ORDER — SODIUM CHLORIDE 0.9 % (FLUSH) 0.9 %
5-40 SYRINGE (ML) INJECTION EVERY 12 HOURS SCHEDULED
Status: DISCONTINUED | OUTPATIENT
Start: 2023-12-05 | End: 2023-12-05 | Stop reason: HOSPADM

## 2023-12-05 RX ORDER — SODIUM CHLORIDE 0.9 % (FLUSH) 0.9 %
5-40 SYRINGE (ML) INJECTION PRN
Status: DISCONTINUED | OUTPATIENT
Start: 2023-12-05 | End: 2023-12-05 | Stop reason: HOSPADM

## 2023-12-05 RX ORDER — LIDOCAINE HYDROCHLORIDE 20 MG/ML
INJECTION, SOLUTION INTRAVENOUS PRN
Status: DISCONTINUED | OUTPATIENT
Start: 2023-12-05 | End: 2023-12-05 | Stop reason: SDUPTHER

## 2023-12-05 RX ORDER — PROPOFOL 10 MG/ML
INJECTION, EMULSION INTRAVENOUS PRN
Status: DISCONTINUED | OUTPATIENT
Start: 2023-12-05 | End: 2023-12-05 | Stop reason: SDUPTHER

## 2023-12-05 RX ADMIN — CEFAZOLIN 2000 MG: 2 INJECTION, POWDER, FOR SOLUTION INTRAMUSCULAR; INTRAVENOUS at 09:30

## 2023-12-05 RX ADMIN — PROPOFOL 20 MG: 10 INJECTION, EMULSION INTRAVENOUS at 09:27

## 2023-12-05 RX ADMIN — PROPOFOL 40 MCG/KG/MIN: 10 INJECTION, EMULSION INTRAVENOUS at 09:28

## 2023-12-05 RX ADMIN — FENTANYL CITRATE 50 MCG: 50 INJECTION, SOLUTION INTRAMUSCULAR; INTRAVENOUS at 09:27

## 2023-12-05 RX ADMIN — LIDOCAINE HYDROCHLORIDE 20 MG: 20 INJECTION, SOLUTION INTRAVENOUS at 09:27

## 2023-12-05 RX ADMIN — SODIUM CHLORIDE: 9 INJECTION, SOLUTION INTRAVENOUS at 09:19

## 2023-12-05 ASSESSMENT — PAIN - FUNCTIONAL ASSESSMENT: PAIN_FUNCTIONAL_ASSESSMENT: 0-10

## 2023-12-05 ASSESSMENT — LIFESTYLE VARIABLES: SMOKING_STATUS: 0

## 2023-12-05 NOTE — ANESTHESIA PRE PROCEDURE
limited  Mouth opening: > = 3 FB   Dental: normal exam     Comment: Pt states mutiple missing teeth, denies loose chipped or cracked    Pulmonary:Negative Pulmonary ROS and normal exam  breath sounds clear to auscultation      (-) not a current smoker                           Cardiovascular:  Exercise tolerance: poor (<4 METS)  (+) hypertension:, valvular problems/murmurs (Mild AR): AS, murmur: Grade 2, Aortic, hyperlipidemia      ECG reviewed  Rhythm: regular  Rate: normal  Echocardiogram reviewed  Stress test reviewed       Beta Blocker:  Not on Beta Blocker      ROS comment: EKG 03/21/2023  Normal sinus rhythm  Cannot rule out Anterior infarct , age undetermined  Abnormal ECG  No previous ECGs available  Confirmed by Karlo Garrett ([id number]) on 3/23/2023 10:09:12 PM    Specimen Collected: 03/21/23 23:18 EDT      2D Echocardiogram 03/08/2023   Summary   Normal left ventricular chamber size. Normal left ventricular systolic function, EF 24%. Mild left ventricular concentric hypertrophy noted. Stage II diastolic dysfunction. Left atrium is enlarged. Increased left atrial volume. Interatrial septum not well visualized but appears intact. Normal right ventricle size and function. There is mild mitral regurgitation. No mitral valve prolapse. Mild aortic stenosis - Mean gradient 18mmHg, peak 31mmHg, SELINA 1.7cm2 by   VTI, 1.6cm2 by Vmax, DLI 0.52. Mild aortic regurgitation, pressure 1/2 time 484ms. There is mild tricuspid regurgitation. Mild pulmonary hypertension, RVSP 45mmHg. Normal aortic root size. No evidence of pericardial effusion. Moderate to large pleural effusion. No intra cardiac mass or thrombus. No previous echo for comparison.       Signature      ----------------------------------------------------------------   Electronically signed by Glendon Severance MD(Interpreting   physician) on 03/09/2023 07:28 AM     Neuro/Psych:   (+) psychiatric history:depression/anxiety

## 2023-12-05 NOTE — PROGRESS NOTES
Son bedside with patient. Discharge instructions reviewed to patient and sin with understanding. Also [facility] nursing facility called and updated about patient and discharge instructions reviewed.

## 2023-12-05 NOTE — ANESTHESIA POSTPROCEDURE EVALUATION
Department of Anesthesiology  Postprocedure Note    Patient: Hunter Khalil  MRN: [id number]  [facility] 1950  Date of evaluation: 12/5/2023      Procedure Summary       Date: 12/05/23 Room / [facility] OR 04 / [facility]    Anesthesia Start: 0652 Anesthesia Stop: 0916    Procedure: removal of tunneled hemodialysis catheter/ FACILITY (Right) Diagnosis:       End stage renal disease ([address])      (End stage renal disease ([address]) [N18.6])    Surgeons: Marilin Higginbotham MD Responsible Provider: Mario Seaman MD    Anesthesia Type: MAC ASA Status: 4            Anesthesia Type: MAC    Jt Phase I: Jt Score: 10    Jt Phase II: Jt Score: 9      Anesthesia Post Evaluation    Patient location during evaluation: PACU  Patient participation: complete - patient participated  Level of consciousness: awake  Pain score: 3  Airway patency: patent  Nausea & Vomiting: no nausea and no vomiting  Complications: no  Cardiovascular status: blood pressure returned to baseline  Respiratory status: acceptable  Hydration status: euvolemic

## 2023-12-05 NOTE — DISCHARGE INSTRUCTIONS
Discharge Instructions for Tunneled HD catheter removal    Call Dr. Lay Life office [phone] with any issues related to surgical site  Keep wound clean and dry  Change 4x4 dressing daily and until no longer draining  When wound is no longer draining ok to shower                                                            Please call with any issues related to AV access in your arm     Diet    You may go back to your regular diet after the procedure. Physical Activity    Do not lift heavy objects or engage in any strenuous exercise or sexual activity for a minimum of 24 hours. Ask your doctor when you will be able to return to work. Do not drive until your doctor tells you to do so. Medications    If you are diagnosed with heart valve disease, you may be given antibiotics to avoid valve infection or endocarditis . Take all of the antibiotic prescribed, even if you do not feel ill. If you are taking medications, follow these general guidelines:   Talk to your doctor about what the best pain relief medicine for you is. Take your medication as directed not more, not less, not at a different time. If you are taking warfarin or persantine, your doctor will tell you when to stop these. Do not stop taking your medications without consulting your healthcare provider. Don't share medications with anyone else. Know what effects and side effects to expect, and report them to your healthcare provider. If you are taking more than one drug, even if it is an over-the-counter medication, herb, or dietary supplement, be sure to check with a physician or pharmacist about drug interactions. Plan ahead for refills so you don't run out. Lifestyle Changes    Together, you and your doctor will plan proper lifestyle changes that will aid in your recovery. Follow-up   Your doctor will discuss the findings and suggest appropriate treatment options.  In some cases, the results can indicate an immediate need

## 2023-12-05 NOTE — OP NOTE
Operative Note      Patient: Jazmin Nevarez  YOB: 1950  MRN: [id number]    Date of Procedure: 12/5/2023    Pre-Op Diagnosis Codes:     * End stage renal disease ([address]) [N18.6]    Post-Op Diagnosis: Same       Procedure :  Removal R IJ tunn hd cath    Surgeon(s):  Jennifer Jamil MD    Assistant:   Resident: Devaughn Cunningham MD    Anesthesia: Monitor Anesthesia Care    Estimated Blood Loss (mL): Minimal    Complications: None    Specimens:   * No specimens in log *    Implants:  * No implants in log *      Drains: * No LDAs found *    Findings: RIJ tunneled line removed in entirety    Detailed Description of Procedure: The patient was identified and the procedure was confirmed. The right neck, chest, and catheter were prepped and draped in the usual sterile fashion. Next, 1% lidocaine mixed with 0.25% Marcaine was used for local anesthesia. Through the catheter exit site the cuff was freed from the surrounding tissues. Traction was applied to the catheter and it was removed intact. Pressure was held for hemostasis for ten minutes and a sterile pressure dressing was applied to the exit site in the operating room. Needle, sponge, and instrument counts were reported as correct x2. The patient tolerated the procedure and was transferred to the recovery area in satisfactory condition. Dr. Joslyn Calles was immediately available for the entirety of the procedure.      Electronically signed by Devaughn Cunningham MD on 12/5/2023 at 9:51 AM    PCP : Taqueria Strickland MD  Nephrologist : Dr Rajat Mittal  ESRD T [facility] AT [facility]     Previous Vascular Procedures  2/10/23 L BC AVF     R IJ tunn hd cath   4/28/23 L BC AVF revision with artegraft patch of proximal cephalic vein   8/40/31 LUE fistulagram  Plasty of proximal cephalic vein with 0L53 mustang   8/25/}23 L Brachial Cephalic forearm loop graft - 4-7 mm acuseal   10/13/23 L Brach Ax AV Graft 407 mm acuseal   12/5/23 Removal R IJ tunn hd cath

## 2023-12-05 NOTE — H&P
VASCULAR SURGERY  HISTORY AND PHYSICAL  12/5/2023    CC: Tunneled line removal    HPI  Raj Beltran is a 74 y.o. female with ESRD on HD who presents for removal of tunneled line. Patient has history of left BC AVF and RIJ tunneled line placed on 2/10/23. She then underwent BC AVF revision w/ artegraft patch on 4/28/23. On 7/21/23, she underwent LUE fistulagram with plasty of the proximal cephalic vein. On 8/25/23, she underwent placement of L BC forearm loop graft. Unfortunately, this thrombosed and she presented on 10/13/23 for planned thrombectomy however due to significant thrombus in cephalic vein outflow, she underwent brachial-axial AV graft. It has been functioning well since placement and she is here for tunneled line removal.     No changes to medical history. No fevers, chills, cough, shortness of breath. Her LUE brachial-axillary graft was last accessed on Saturday with plans for another hemodialysis session today.      Past Medical History:   Diagnosis Date    Anemia     iron deficiency    Cirrhosis of liver ([address])     COVID-19     Depression     Hemodialysis patient ([address])     Hypertension     Iron deficiency anemia, unspecified     Kidney failure     Thyroid disease        Past Surgical History:   Procedure Laterality Date    CT BIOPSY RENAL  11/10/2022    CT BIOPSY RENAL 11/10/2022 Juan Novoa MD [facility]    DIALYSIS FISTULA CREATION Left 02/10/2023    CREATION ARTERIOVENOUS FISTULA LEFT ARM performed by Prasad Lacey MD at [facility] 04/28/2023    REVISION OF LEFT ARM ARTERIOVENOUS FISTULA performed by Prasad Lacey MD at [facility]  07/21/2023    Dr. Fredy Burkett N/A 03/14/2023    EGD ESOPHAGOGASTRODUODENOSCOPY performed by Eryn Tomas MD at [address] [place] [address] Left 8/25/2023    INSERTION LEFT UPPER EXTREMITY ARTERIOVENOUS GRAFT performed by Prasad Lacey MD at

## 2023-12-11 ENCOUNTER — HOSPITAL ENCOUNTER (OUTPATIENT)
Dept: INTERVENTIONAL RADIOLOGY/VASCULAR | Age: 73
Discharge: HOME | End: 2023-12-11
Payer: COMMERCIAL

## 2023-12-11 DIAGNOSIS — R18.8 OTHER ASCITES: ICD-10-CM

## 2023-12-11 PROCEDURE — 49083 ABD PARACENTESIS W/IMAGING: CPT

## 2023-12-11 PROCEDURE — P9047 ALBUMIN (HUMAN), 25%, 50ML: HCPCS | Performed by: PHYSICIAN ASSISTANT

## 2023-12-11 PROCEDURE — 6360000002 HC RX W HCPCS: Performed by: PHYSICIAN ASSISTANT

## 2023-12-11 PROCEDURE — C1729 CATH, DRAINAGE: HCPCS

## 2023-12-11 RX ORDER — ALBUMIN (HUMAN) 12.5 G/50ML
25 SOLUTION INTRAVENOUS ONCE
Status: COMPLETED | OUTPATIENT
Start: 2023-12-11 | End: 2023-12-11

## 2023-12-11 RX ADMIN — ALBUMIN (HUMAN) 25 G: 0.25 INJECTION, SOLUTION INTRAVENOUS at 11:48

## 2023-12-11 NOTE — PROGRESS NOTES
Patient here for ultrasound guided paracentesis. Instructions given and questions answered. Consent signed. Abdomen scanned and marked under the guidance of procedural Radiologist.     1114  start procedure /54 69 18 96% on room air     1140  end procedure /55 79 18 95% on room air      5900  cc drained of clear straw colored ascites fluid.       Dry sterile dressing of folded 4 x 4 tegaderm to RLQ     25  grams of IV Albumin given, per order    Patient sent back to facility with progress note    Mat Willingham for patient to be discharged back to facility

## 2024-01-24 NOTE — PROGRESS NOTES
[facility]  Nephrology Attending Progress Note      SUBJECTIVE:     3/15/23-she had a right IJ tunneled dialysis catheter placed this morning.  She is for initiation of hemodialysis this afternoon.  Her son is in the room and both are updated.  They are awaiting a chair time at [facility]    PROBLEM LIST:    Patient Active Problem List   Diagnosis    End stage renal disease (HCC)    Encounter regarding vascular access for dialysis for end-stage renal disease (HCC)    Acute renal failure (ARF) (HCC)        PAST MEDICAL HISTORY:    Past Medical History:   Diagnosis Date    Anemia     iron deficiency    Hypertension     Kidney failure        DIET:    ADULT DIET; Regular; Low Sodium (2 gm)     PHYSICAL EXAM:     Patient Vitals for the past 24 hrs:   BP Temp Temp src Pulse Resp SpO2 Weight   03/15/23 1337 (!) 103/49 -- -- 72 -- -- --   03/15/23 1330 (!) 103/49 98.2 °F (36.8 °C) -- 72 16 -- 161 lb 13.1 oz (73.4 kg)   03/15/23 1207 (!) 112/55 -- -- -- -- -- --   03/15/23 0700 (!) 120/58 98.5 °F (36.9 °C) Oral 79 18 95 % --   03/15/23 0608 (!) 130/48 -- -- -- -- -- --   03/15/23 0602 (!) 105/36 -- -- -- -- -- --   03/14/23 2330 (!) 130/58 97.7 °F (36.5 °C) Oral 80 16 96 % --   03/14/23 1725 -- -- -- 73 -- -- --   03/14/23 1722 -- -- -- 75 -- 97 % --   03/14/23 1721 -- -- -- 74 -- 97 % --   03/14/23 1719 (!) 106/57 -- -- 73 17 97 % --   03/14/23 1714 (!) 111/50 98 °F (36.7 °C) Infrared 75 17 97 % --   03/14/23 1709 (!) 111/57 -- -- 75 15 95 % --   03/14/23 1704 (!) 110/52 -- -- 74 14 96 % --   03/14/23 1659 100/61 -- -- 73 21 95 % --   03/14/23 1653 (!) 100/41 -- -- 74 25 94 % --   03/14/23 1649 (!) 103/47 97.1 °F (36.2 °C) Infrared 72 17 (!) 9 % --   03/14/23 1515 (!) 108/54 97.5 °F (36.4 °C) Oral 75 -- 96 % --   @      Intake/Output Summary (Last 24 hours) at 3/15/2023 1413  Last data filed at 3/15/2023 0455  Gross per 24 hour   Intake 110 ml   Output 750 ml   Net -640 ml         Wt Readings from Last 3  Encounters:   03/15/23 161 lb 13.1 oz (73.4 kg)   02/27/23 178 lb (80.7 kg)   02/10/23 167 lb (75.8 kg)       Constitutional:  Pt is in no acute distress  Head: normocephalic, atraumatic  Neck: no JVD  Cardiovascular: S1 S2 no S3 or rub  Respiratory: Clear upper diminished bases  Gastrointestinal:  Soft, nontender, distended  Ext: ++ edema   Skin: dry, no rash  Neuro: Awake alert and oriented    MEDS (scheduled):    pantoprazole  40 mg IntraVENous BID    bumetanide  2 mg IntraVENous Q8H    polyethylene glycol  17 g Oral Daily    cholestyramine  1 packet Oral BID    folic acid  1 mg Oral Daily    colchicine  0.6 mg Oral Every Other Day    levothyroxine  100 mcg Oral Daily    ferrous sulfate  325 mg Oral Once per day on Mon Wed Fri    amLODIPine  20 mg Oral Daily    FLUoxetine  20 mg Oral Daily    hydrALAZINE  20 mg Oral BID    OLANZapine zydis  2.5 mg Oral Nightly    sodium bicarbonate  650 mg Oral BID    vitamin B-12  1,000 mcg Oral Daily    zinc gluconate  50 mg Oral Daily    [Held by provider] pantoprazole  40 mg Oral QAM AC    vitamin D  50,000 Units Oral Weekly       MEDS (infusions):   sodium chloride         MEDS (prn):  albumin human, sodium chloride, perflutren lipid microspheres    DATA:    Recent Labs     03/13/23  0515 03/14/23  0552 03/15/23  0505   WBC 4.2* 3.5* 4.0*   HGB 8.1* 7.6* 7.6*   HCT 24.9* 24.6* 24.8*   MCV 91.2 93.2 92.9   PLT 58* 56* 71*     Recent Labs     03/13/23  0515 03/14/23  0552 03/15/23  0505    137 140   K 4.0 3.8 4.0    100 103   CO2 23 24 24   BUN 62* 66* 68*   CREATININE 6.1* 6.5* 6.7*   LABGLOM 7 6 6   GLUCOSE 76 80 80   CALCIUM 7.8* 8.0* 8.0*   ALT 7 8 8   AST 19 26 23   BILITOT 0.5 0.5 0.5   ALKPHOS 116* 133* 125*   MG 2.4 2.5 2.5   PHOS 5.0* 4.8* 5.2*       Lab Results   Component Value Date    LABALBU 3.0 (L) 03/15/2023    LABALBU 3.2 (L) 03/14/2023    LABALBU 3.1 (L) 03/13/2023     Lab Results   Component Value Date    TSH 11.870 (H) 03/09/2023       Iron Studies  Lab Results   Component Value Date    IRON 28 (L) 03/09/2023    TIBC 157 (L) 03/09/2023    FERRITIN 264 03/09/2023     Vitamin B-12   Date Value Ref Range Status   03/09/2023 >2000 (H) 211 - 946 pg/mL Final     Folate   Date Value Ref Range Status   03/09/2023 3.3 (L) 4.8 - 24.2 ng/mL Final       Vit D, 25-Hydroxy   Date Value Ref Range Status   03/08/2023 <6 (L) 30 - 100 ng/mL Final     Comment:     <20 ng/mL. ........... [place] Deficient  20-30 ng/mL. ......... [place] Insufficient   ng/mL. ........ [place] Sufficient  >100 ng/mL. .......... [place] Toxic       PTH   Date Value Ref Range Status   03/09/2023 104 (H) 15 - 65 pg/mL Final       No components found for: URIC    Lab Results   Component Value Date/Time    COLORU Yellow 03/08/2023 05:00 PM    NITRU Negative 03/08/2023 05:00 PM    GLUCOSEU Negative 03/08/2023 05:00 PM    KETUA Negative 03/08/2023 05:00 PM    UROBILINOGEN 0.2 03/08/2023 05:00 PM    BILIRUBINUR Negative 03/08/2023 05:00 PM       No results found for: Elizabeth Medrano      IMPRESSION/RECOMMENDATIONS:      Chronic kidney disease stage V-  In the setting of biopsy-proven IgA nephropathy  Recent baseline serum creatinine 4.37 to 4.50 mg/dL  Creatinine is continued to trend upward. Patient does have an immature upper extremity AV fistula. She is for intervention with Dr. Fisher Due April 6. As she is continued to decline she is agreeable to initiation of kidney replacement therapy. IR was consulted yesterday for placement of tunneled dialysis catheter and initiation of hemodialysis prior to discharge. Plan dialysis post tunneled dialysis catheter placement. 2.  Volume overload-  In the setting of chronic kidney disease and cirrhosis  She is status post paracentesis 3/14 for 3450 mL  She had 1 L of urine out  We will plan further volume removal with initiation of hemodialysis    3. Hypertension with chronic kidney disease stage V-  Blood pressure goal less than 130/80  Follow with volume removal with hemodialysis    4. Secondary hyperparathyroidism of renal origin-  ; vitamin D<6 on 3/9  She has been started on ergocalciferol 50,000 units weekly  Calcium 8.0; phosphorus 5.2  She is not currently on a phosphorus binder. 5.  Anemia of chronic kidney disease-  With history of pernicious anemia and folate deficiency. Further exacerbated by GI bleed  Plan transfusion for hemoglobin less than 7  Hemoglobin 7.6  We will start patient on [facility], APRN - [facility]     Patient seen and examined. Patient's son and multiple other family members are present at the bedside. Chart reviewed. Patient is status post placement of a tunneled hemodialysis catheter  I had a face to face encounter with the patient. Agree with exam.    Agree with  formulation, assessment and plan as outlined above and directed by me. Addition and corrections were done as deemed appropriate. My exam and plan include:     Continue current treatment as outlined above. Patient for her first hemodialysis treatment today. Patient should be okay for discharge in the next 48 hours. From a renal standpoint.   Arrangements being made for outpatient dialysis in [place] MD  Nephrology        Electronically signed by Cameron Barry MD on 3/15/2023 at 6:46 PM No

## 2024-02-21 ENCOUNTER — HOSPITAL ENCOUNTER (OUTPATIENT)
Age: 74
Discharge: HOME | End: 2024-02-21
Payer: COMMERCIAL

## 2024-02-21 ENCOUNTER — HOSPITAL ENCOUNTER (OUTPATIENT)
Dept: INTERVENTIONAL RADIOLOGY/VASCULAR | Age: 74
Discharge: HOME | End: 2024-02-21
Payer: COMMERCIAL

## 2024-02-21 DIAGNOSIS — R18.8 OTHER ASCITES: ICD-10-CM

## 2024-02-21 DIAGNOSIS — R18.8 OTHER ASCITES: Primary | ICD-10-CM

## 2024-02-21 LAB
BASOPHILS # BLD: 0.02 K/UL (ref 0–0.2)
BASOPHILS NFR BLD: 1 % (ref 0–2)
EOSINOPHIL # BLD: 0.11 K/UL (ref 0.05–0.5)
EOSINOPHILS RELATIVE PERCENT: 3 % (ref 0–6)
ERYTHROCYTE [DISTWIDTH] IN BLOOD BY AUTOMATED COUNT: 15.9 % (ref 11.5–15)
HCT VFR BLD AUTO: 26.7 % (ref 34–48)
HGB BLD-MCNC: 8 G/DL (ref 11.5–15.5)
IMM GRANULOCYTES # BLD AUTO: <0.03 K/UL (ref 0–0.58)
IMM GRANULOCYTES NFR BLD: 0 % (ref 0–5)
INR PPP: 1.3
LYMPHOCYTES NFR BLD: 0.69 K/UL (ref 1.5–4)
LYMPHOCYTES RELATIVE PERCENT: 19 % (ref 20–42)
MCH RBC QN AUTO: 25.6 PG (ref 26–35)
MCHC RBC AUTO-ENTMCNC: 30 G/DL (ref 32–34.5)
MCV RBC AUTO: 85.6 FL (ref 80–99.9)
MONOCYTES NFR BLD: 0.32 K/UL (ref 0.1–0.95)
MONOCYTES NFR BLD: 9 % (ref 2–12)
NEUTROPHILS NFR BLD: 68 % (ref 43–80)
NEUTS SEG NFR BLD: 2.47 K/UL (ref 1.8–7.3)
PLATELET # BLD AUTO: 131 K/UL (ref 130–450)
PMV BLD AUTO: 10.8 FL (ref 7–12)
PROTHROMBIN TIME: 14.7 SEC (ref 9.3–12.4)
RBC # BLD AUTO: 3.12 M/UL (ref 3.5–5.5)
WBC OTHER # BLD: 3.6 K/UL (ref 4.5–11.5)

## 2024-02-21 PROCEDURE — 85610 PROTHROMBIN TIME: CPT

## 2024-02-21 PROCEDURE — 36415 COLL VENOUS BLD VENIPUNCTURE: CPT

## 2024-02-21 PROCEDURE — 49083 ABD PARACENTESIS W/IMAGING: CPT

## 2024-02-21 PROCEDURE — P9047 ALBUMIN (HUMAN), 25%, 50ML: HCPCS | Performed by: PHYSICIAN ASSISTANT

## 2024-02-21 PROCEDURE — 85025 COMPLETE CBC W/AUTO DIFF WBC: CPT

## 2024-02-21 PROCEDURE — 6360000002 HC RX W HCPCS: Performed by: PHYSICIAN ASSISTANT

## 2024-02-21 PROCEDURE — C1729 CATH, DRAINAGE: HCPCS

## 2024-02-21 RX ORDER — ALBUMIN (HUMAN) 12.5 G/50ML
25 SOLUTION INTRAVENOUS ONCE
Status: COMPLETED | OUTPATIENT
Start: 2024-02-21 | End: 2024-02-21

## 2024-02-21 RX ADMIN — ALBUMIN (HUMAN) 25 G: 0.25 INJECTION, SOLUTION INTRAVENOUS at 13:43

## 2024-02-21 NOTE — PROGRESS NOTES
Patient here for ultrasound guided paracentesis.      Instructions given and questions answered. Consent signed.      Abdomen scanned and marked under the guidance of procedural Radiologist.     1308   start procedure /65 89 18 100% on room air     1324   end procedure /62 84 17 100% on room air      3050   cc drained of yellow colored ascites fluid.      Dry sterile dressing of folded 4 x 4 tegaderm to RLQ     25  grams of IV Albumin given, per order    Copy of patients progress note sent back to facility with patient    Ok for patient to be discharged back to facility

## 2024-02-26 LAB — AMMONIA PLAS-SCNC: 46 UMOL/L (ref 11–51)

## 2024-03-11 LAB
25(OH)D3 SERPL-MCNC: 87.3 NG/ML (ref 30–100)
ALBUMIN SERPL-MCNC: 2.8 G/DL (ref 3.5–5.2)
ALP SERPL-CCNC: 273 U/L (ref 35–104)
ALT SERPL-CCNC: 19 U/L (ref 0–32)
ANION GAP SERPL CALCULATED.3IONS-SCNC: 12 MMOL/L (ref 7–16)
ANION GAP SERPL CALCULATED.3IONS-SCNC: 13 MMOL/L (ref 7–16)
AST SERPL-CCNC: 30 U/L (ref 0–31)
BASOPHILS # BLD: 0.02 K/UL (ref 0–0.2)
BASOPHILS NFR BLD: 1 % (ref 0–2)
BILIRUB SERPL-MCNC: 0.4 MG/DL (ref 0–1.2)
BUN SERPL-MCNC: 48 MG/DL (ref 6–23)
BUN SERPL-MCNC: 48 MG/DL (ref 6–23)
CALCIUM SERPL-MCNC: 8.4 MG/DL (ref 8.6–10.2)
CALCIUM SERPL-MCNC: 8.5 MG/DL (ref 8.6–10.2)
CHLORIDE SERPL-SCNC: 94 MMOL/L (ref 98–107)
CHLORIDE SERPL-SCNC: 96 MMOL/L (ref 98–107)
CO2 SERPL-SCNC: 28 MMOL/L (ref 22–29)
CO2 SERPL-SCNC: 28 MMOL/L (ref 22–29)
CREAT SERPL-MCNC: 4.8 MG/DL (ref 0.5–1)
CREAT SERPL-MCNC: 4.8 MG/DL (ref 0.5–1)
EOSINOPHIL # BLD: 0.19 K/UL (ref 0.05–0.5)
EOSINOPHILS RELATIVE PERCENT: 5 % (ref 0–6)
ERYTHROCYTE [DISTWIDTH] IN BLOOD BY AUTOMATED COUNT: 16.2 % (ref 11.5–15)
FOLATE SERPL-MCNC: >20 NG/ML (ref 4.8–24.2)
GFR SERPL CREATININE-BSD FRML MDRD: 9 ML/MIN/1.73M2
GFR SERPL CREATININE-BSD FRML MDRD: 9 ML/MIN/1.73M2
GLUCOSE SERPL-MCNC: 82 MG/DL (ref 74–99)
GLUCOSE SERPL-MCNC: 82 MG/DL (ref 74–99)
HCT VFR BLD AUTO: 28.7 % (ref 34–48)
HGB BLD-MCNC: 8.3 G/DL (ref 11.5–15.5)
IMM GRANULOCYTES # BLD AUTO: 0.03 K/UL (ref 0–0.58)
IMM GRANULOCYTES NFR BLD: 1 % (ref 0–5)
LYMPHOCYTES NFR BLD: 0.74 K/UL (ref 1.5–4)
LYMPHOCYTES RELATIVE PERCENT: 18 % (ref 20–42)
MCH RBC QN AUTO: 25.2 PG (ref 26–35)
MCHC RBC AUTO-ENTMCNC: 28.9 G/DL (ref 32–34.5)
MCV RBC AUTO: 87.2 FL (ref 80–99.9)
MONOCYTES NFR BLD: 0.52 K/UL (ref 0.1–0.95)
MONOCYTES NFR BLD: 12 % (ref 2–12)
NEUTROPHILS NFR BLD: 65 % (ref 43–80)
NEUTS SEG NFR BLD: 2.72 K/UL (ref 1.8–7.3)
PLATELET # BLD AUTO: 138 K/UL (ref 130–450)
PMV BLD AUTO: 11.6 FL (ref 7–12)
POTASSIUM SERPL-SCNC: 5 MMOL/L (ref 3.5–5)
POTASSIUM SERPL-SCNC: 5.1 MMOL/L (ref 3.5–5)
PROT SERPL-MCNC: 6.2 G/DL (ref 6.4–8.3)
RBC # BLD AUTO: 3.29 M/UL (ref 3.5–5.5)
RBC # BLD: ABNORMAL 10*6/UL
SODIUM SERPL-SCNC: 135 MMOL/L (ref 132–146)
SODIUM SERPL-SCNC: 136 MMOL/L (ref 132–146)
TSH SERPL DL<=0.05 MIU/L-ACNC: 0.86 UIU/ML (ref 0.27–4.2)
VIT B12 SERPL-MCNC: 995 PG/ML (ref 211–946)
WBC OTHER # BLD: 4.2 K/UL (ref 4.5–11.5)

## 2024-03-19 LAB
INR PPP: 1.3
PROTHROMBIN TIME: 14 SEC (ref 9.3–12.4)

## 2024-03-20 LAB
AFP SERPL-MCNC: <1.8 UG/L
CERULOPLASMIN SERPL-MCNC: 17 MG/DL (ref 16–45)

## 2024-04-22 ENCOUNTER — HOSPITAL ENCOUNTER (OUTPATIENT)
Dept: INTERVENTIONAL RADIOLOGY/VASCULAR | Age: 74
Discharge: HOME | End: 2024-04-24
Payer: COMMERCIAL

## 2024-04-22 VITALS
RESPIRATION RATE: 16 BRPM | OXYGEN SATURATION: 94 % | DIASTOLIC BLOOD PRESSURE: 56 MMHG | SYSTOLIC BLOOD PRESSURE: 132 MMHG | HEART RATE: 81 BPM

## 2024-04-22 DIAGNOSIS — R18.8 OTHER ASCITES: ICD-10-CM

## 2024-04-22 PROCEDURE — 6360000002 HC RX W HCPCS: Performed by: PHYSICIAN ASSISTANT

## 2024-04-22 PROCEDURE — C1729 CATH, DRAINAGE: HCPCS

## 2024-04-22 PROCEDURE — 49083 ABD PARACENTESIS W/IMAGING: CPT

## 2024-04-22 PROCEDURE — P9047 ALBUMIN (HUMAN), 25%, 50ML: HCPCS | Performed by: PHYSICIAN ASSISTANT

## 2024-04-22 RX ORDER — ALBUMIN (HUMAN) 12.5 G/50ML
25 SOLUTION INTRAVENOUS ONCE
Status: COMPLETED | OUTPATIENT
Start: 2024-04-22 | End: 2024-04-22

## 2024-04-22 RX ADMIN — ALBUMIN (HUMAN) 25 G: 0.25 INJECTION, SOLUTION INTRAVENOUS at 13:00

## 2024-04-22 NOTE — PROGRESS NOTES
Patient here for ultrasound guided paracentesis.      Instructions given and questions answered. Consent signed.      Abdomen scanned and marked under the guidance of procedural Radiologist.     1230  start procedure /63  81  16  99% on room air    1300  end procedure /56  81  16  94% on room air      4050  cc drained of hazy yellow colored ascites fluid.      Dry sterile dressing of folded 4 x 4 and tegaderm to RLQ     25  grams of IV Albumin given, per order    Ok for patient to be discharged home

## 2024-06-10 ENCOUNTER — OFFICE VISIT (OUTPATIENT)
Dept: VASCULAR SURGERY | Age: 74
End: 2024-06-10
Payer: COMMERCIAL

## 2024-06-10 VITALS
HEIGHT: 62 IN | DIASTOLIC BLOOD PRESSURE: 68 MMHG | HEART RATE: 78 BPM | BODY MASS INDEX: 21.58 KG/M2 | OXYGEN SATURATION: 99 % | RESPIRATION RATE: 18 BRPM | SYSTOLIC BLOOD PRESSURE: 122 MMHG

## 2024-06-10 DIAGNOSIS — Z99.2 ENCOUNTER REGARDING VASCULAR ACCESS FOR DIALYSIS FOR END-STAGE RENAL DISEASE (HCC): Primary | ICD-10-CM

## 2024-06-10 DIAGNOSIS — N18.6 ENCOUNTER REGARDING VASCULAR ACCESS FOR DIALYSIS FOR END-STAGE RENAL DISEASE (HCC): Primary | ICD-10-CM

## 2024-06-10 PROCEDURE — 1123F ACP DISCUSS/DSCN MKR DOCD: CPT

## 2024-06-10 PROCEDURE — 99213 OFFICE O/P EST LOW 20 MIN: CPT

## 2024-06-10 RX ORDER — VIT B COMP NO.3/FOLIC/C/BIOTIN 1 MG-60 MG
1 TABLET ORAL
COMMUNITY

## 2024-06-10 RX ORDER — DOXYCYCLINE HYCLATE 100 MG
100 TABLET ORAL 2 TIMES DAILY
Qty: 20 TABLET | Refills: 0 | Status: SHIPPED | OUTPATIENT
Start: 2024-06-10 | End: 2024-06-20

## 2024-06-10 NOTE — PROGRESS NOTES
Social History Narrative    Not on file     Social Determinants of Health     Financial Resource Strain: Not on file   Food Insecurity: Not on file   Transportation Needs: Not on file   Physical Activity: Not on file   Stress: Not on file   Social Connections: Not on file   Intimate Partner Violence: Not on file   Housing Stability: Not on file     No family history on file.  Labs  Lab Results   Component Value Date    WBC 4.2 (L) 03/11/2024    HGB 8.3 (L) 03/11/2024    HCT 28.7 (L) 03/11/2024     03/11/2024    PROTIME 14.0 (H) 03/19/2024    INR 1.3 03/19/2024    K 5.0 03/11/2024    BUN 48 (H) 03/11/2024    CREATININE 4.8 (H) 03/11/2024     PHYSICAL EXAM:    CONSTITUTIONAL:   Awake, alert, cooperative  PSYCHIATRIC :  Oriented to time, place and person     Appropriate insight to disease process  EYES: Lids and lashes normal  ENT:  External ears and nose without lesions   Hearing deficits not noted  NECK: Supple, symmetrical, trachea midline  LUNGS:  No increased work of breathing                 Clear to auscultation  CARDIOVASCULAR:  regular rate and rhythm   ABDOMEN:  soft, non-distended, non-tender  SKIN:   Normal skin color   Texture and turgor normal, no induration  EXTREMITIES:   Left UE  +Thrill   +Bruit   Edema absent  Incisions well healed   Wound over thrombosed forearm loop graft.  Scab removed. No signs of infection.  No graft exposed.     Left brachial 2+   Left radial 2+   Left ulnar biphasic   Left palmar biphasic     A/P Hx of previous Left upper extremity brach ax arteriovenous graft  L forearm wound overlying the graft  pt's access is functioning well  they are using it for dialysis  they are not experiencing symptoms of steal syndrome  She does have a wound over the left forearm loop graft  Script for doxy sent with her to the facility  Facility to do daily dressing changes to the left arm wound with alginate and 4x4 - instructions written on facility paperwork  I asked her to call us with

## 2024-06-11 LAB
ANION GAP SERPL CALCULATED.3IONS-SCNC: 17 MMOL/L (ref 7–16)
BASOPHILS # BLD: 0.02 K/UL (ref 0–0.2)
BASOPHILS NFR BLD: 1 % (ref 0–2)
BUN SERPL-MCNC: 67 MG/DL (ref 6–23)
CALCIUM SERPL-MCNC: 9.4 MG/DL (ref 8.6–10.2)
CHLORIDE SERPL-SCNC: 96 MMOL/L (ref 98–107)
CO2 SERPL-SCNC: 23 MMOL/L (ref 22–29)
CREAT SERPL-MCNC: 5.8 MG/DL (ref 0.5–1)
EOSINOPHIL # BLD: 0.23 K/UL (ref 0.05–0.5)
EOSINOPHILS RELATIVE PERCENT: 6 % (ref 0–6)
ERYTHROCYTE [DISTWIDTH] IN BLOOD BY AUTOMATED COUNT: 17.3 % (ref 11.5–15)
GFR, ESTIMATED: 7 ML/MIN/1.73M2
GLUCOSE SERPL-MCNC: 80 MG/DL (ref 74–99)
HCT VFR BLD AUTO: 30 % (ref 34–48)
HGB BLD-MCNC: 8.9 G/DL (ref 11.5–15.5)
IMM GRANULOCYTES # BLD AUTO: <0.03 K/UL (ref 0–0.58)
IMM GRANULOCYTES NFR BLD: 0 % (ref 0–5)
LYMPHOCYTES NFR BLD: 0.72 K/UL (ref 1.5–4)
LYMPHOCYTES RELATIVE PERCENT: 19 % (ref 20–42)
MCH RBC QN AUTO: 23.6 PG (ref 26–35)
MCHC RBC AUTO-ENTMCNC: 29.7 G/DL (ref 32–34.5)
MCV RBC AUTO: 79.6 FL (ref 80–99.9)
MONOCYTES NFR BLD: 0.39 K/UL (ref 0.1–0.95)
MONOCYTES NFR BLD: 10 % (ref 2–12)
NEUTROPHILS NFR BLD: 64 % (ref 43–80)
NEUTS SEG NFR BLD: 2.46 K/UL (ref 1.8–7.3)
PLATELET CONFIRMATION: NORMAL
PLATELET, FLUORESCENCE: 84 K/UL (ref 130–450)
PMV BLD AUTO: ABNORMAL FL (ref 7–12)
POTASSIUM SERPL-SCNC: 6.2 MMOL/L (ref 3.5–5)
RBC # BLD AUTO: 3.77 M/UL (ref 3.5–5.5)
SODIUM SERPL-SCNC: 136 MMOL/L (ref 132–146)
TSH SERPL DL<=0.05 MIU/L-ACNC: 0.72 UIU/ML (ref 0.27–4.2)
WBC OTHER # BLD: 3.8 K/UL (ref 4.5–11.5)

## 2024-06-24 ENCOUNTER — OFFICE VISIT (OUTPATIENT)
Dept: VASCULAR SURGERY | Age: 74
End: 2024-06-24
Payer: COMMERCIAL

## 2024-06-24 DIAGNOSIS — N18.6 ENCOUNTER REGARDING VASCULAR ACCESS FOR DIALYSIS FOR END-STAGE RENAL DISEASE (HCC): Primary | ICD-10-CM

## 2024-06-24 DIAGNOSIS — Z99.2 ENCOUNTER REGARDING VASCULAR ACCESS FOR DIALYSIS FOR END-STAGE RENAL DISEASE (HCC): Primary | ICD-10-CM

## 2024-06-24 PROCEDURE — 99213 OFFICE O/P EST LOW 20 MIN: CPT

## 2024-06-24 PROCEDURE — 1123F ACP DISCUSS/DSCN MKR DOCD: CPT

## 2024-06-24 RX ORDER — POLYETHYLENE GLYCOL 3350 17 G/17G
17 POWDER, FOR SOLUTION ORAL DAILY PRN
COMMUNITY

## 2024-06-24 RX ORDER — MIRTAZAPINE 15 MG/1
15 TABLET, FILM COATED ORAL NIGHTLY
COMMUNITY
Start: 2024-06-07

## 2024-06-24 RX ORDER — CALCIUM ACETATE 667 MG/1
CAPSULE ORAL
COMMUNITY
Start: 2024-06-15

## 2024-06-24 NOTE — PROGRESS NOTES
Vascular Surgery Outpatient Followup    PCP : Eufemia Garcia MD  Nephrologist : Dr Bello  Dialysis T [facility] Sat    Previous Vascular Procedures  2/10/23 L BC AVF     R IJ tunn hd cath   4/28/23 L BC AVF revision with artegraft patch of proximal cephalic vein   7/21/23 LUE fistulagram  Plasty of proximal cephalic vein with 6x40 mustang   8/25/}23 L Brachial Cephalic forearm loop graft - 4-7 mm acuseal   10/13/23 L Brach Ax AV Graft 407 mm acuseal   12/5/23 Removal R IJ tunn hd cath     HISTORY OF PRESENT ILLNESS:    The patient is a 74 y.o. female who is here in regards to follow up of their previosly placed left brach ax graft.  She was seen 2 weeks ago and her left brach ax graft is functioning without issue.  However she did have an old forearm loop graft which had a wound over it.  She was given a script for doxy and the facility was to do daily dressing changes with alginate.  The wound is much improved.  She will be going home from the facility later this week.      Past Medical History:        Diagnosis Date    Anemia     iron deficiency    Cirrhosis of liver (HCC)     COVID-19     Depression     Hemodialysis patient (HCC)     Hypertension     Iron deficiency anemia, unspecified     Kidney failure     Thyroid disease      Past Surgical History:        Procedure Laterality Date    CATHETER REMOVAL Right 12/5/2023    removal of tunneled hemodialysis catheter/ FACILITY performed by Quin Chavez [place] [facility] [place]    CT BIOPSY RENAL  11/10/2022    CT BIOPSY RENAL 11/10/2022 Juan Swenson MD [facility] [place]    DIALYSIS FISTULA CREATION Left 02/10/2023    CREATION ARTERIOVENOUS FISTULA LEFT ARM performed by Quin Chavez MD at [facility] [place]    DIALYSIS FISTULA CREATION Left 04/28/2023    REVISION OF LEFT ARM ARTERIOVENOUS FISTULA performed by Quin Chavez MD at [facility] [place]    FISTULAGRAM  07/21/2023    Dr. Chavez- Fistulaplasty    UPPER GASTROINTESTINAL ENDOSCOPY N/A 03/14/2023    EGD

## 2024-07-04 ENCOUNTER — HOSPITAL ENCOUNTER (INPATIENT)
Age: 74
LOS: 1 days | DRG: 871 | End: 2024-07-05
Attending: EMERGENCY MEDICINE | Admitting: INTERNAL MEDICINE
Payer: MEDICARE

## 2024-07-04 ENCOUNTER — APPOINTMENT (OUTPATIENT)
Dept: CT IMAGING | Age: 74
DRG: 871 | End: 2024-07-04
Payer: MEDICARE

## 2024-07-04 ENCOUNTER — APPOINTMENT (OUTPATIENT)
Dept: GENERAL RADIOLOGY | Age: 74
DRG: 871 | End: 2024-07-04
Payer: MEDICARE

## 2024-07-04 DIAGNOSIS — A41.9 SEPTIC SHOCK DUE TO URINARY TRACT INFECTION (HCC): Primary | ICD-10-CM

## 2024-07-04 DIAGNOSIS — R65.21 SEPTIC SHOCK DUE TO URINARY TRACT INFECTION (HCC): Primary | ICD-10-CM

## 2024-07-04 DIAGNOSIS — N39.0 SEPTIC SHOCK DUE TO URINARY TRACT INFECTION (HCC): Primary | ICD-10-CM

## 2024-07-04 DIAGNOSIS — J96.01 ACUTE HYPOXIC RESPIRATORY FAILURE (HCC): ICD-10-CM

## 2024-07-04 LAB
ABO + RH BLD: NORMAL
ALBUMIN SERPL-MCNC: 2.5 G/DL (ref 3.5–5.2)
ALP SERPL-CCNC: 99 U/L (ref 35–104)
ALT SERPL-CCNC: 13 U/L (ref 0–32)
AMMONIA PLAS-SCNC: 19 UMOL/L (ref 11–51)
AMMONIA PLAS-SCNC: 27 UMOL/L (ref 11–51)
ANION GAP SERPL CALCULATED.3IONS-SCNC: 28 MMOL/L (ref 7–16)
ARM BAND NUMBER: NORMAL
AST SERPL-CCNC: 29 U/L (ref 0–31)
B PARAP IS1001 DNA NPH QL NAA+NON-PROBE: NOT DETECTED
B PERT DNA SPEC QL NAA+PROBE: NOT DETECTED
B.E.: -9.9 MMOL/L (ref -3–3)
BACTERIA URNS QL MICRO: POSITIVE
BASOPHILS # BLD: 0 K/UL (ref 0–0.2)
BASOPHILS NFR BLD: 0 % (ref 0–2)
BILIRUB SERPL-MCNC: 0.9 MG/DL (ref 0–1.2)
BILIRUB UR QL STRIP: ABNORMAL
BLOOD BANK SAMPLE EXPIRATION: NORMAL
BLOOD GROUP ANTIBODIES SERPL: NEGATIVE
BUN SERPL-MCNC: 69 MG/DL (ref 6–23)
C PNEUM DNA NPH QL NAA+NON-PROBE: NOT DETECTED
CALCIUM SERPL-MCNC: 9.6 MG/DL (ref 8.6–10.2)
CHLORIDE SERPL-SCNC: 96 MMOL/L (ref 98–107)
CLARITY UR: ABNORMAL
CO2 SERPL-SCNC: 15 MMOL/L (ref 22–29)
COHB: 1.5 % (ref 0–1.5)
COLOR UR: YELLOW
CREAT SERPL-MCNC: 5.8 MG/DL (ref 0.5–1)
CRITICAL: ABNORMAL
DATE ANALYZED: ABNORMAL
DATE OF COLLECTION: ABNORMAL
EKG ATRIAL RATE: 121 BPM
EKG P AXIS: 47 DEGREES
EKG P-R INTERVAL: 140 MS
EKG Q-T INTERVAL: 302 MS
EKG QRS DURATION: 80 MS
EKG QTC CALCULATION (BAZETT): 428 MS
EKG R AXIS: 23 DEGREES
EKG T AXIS: 55 DEGREES
EKG VENTRICULAR RATE: 121 BPM
EOSINOPHIL # BLD: 0.03 K/UL (ref 0.05–0.5)
EOSINOPHILS RELATIVE PERCENT: 1 % (ref 0–6)
EPI CELLS #/AREA URNS HPF: ABNORMAL /HPF
ERYTHROCYTE [DISTWIDTH] IN BLOOD BY AUTOMATED COUNT: 17.2 % (ref 11.5–15)
FLUAV RNA NPH QL NAA+NON-PROBE: NOT DETECTED
FLUBV RNA NPH QL NAA+NON-PROBE: NOT DETECTED
GFR, ESTIMATED: 7 ML/MIN/1.73M2
GLUCOSE BLD-MCNC: 114 MG/DL (ref 74–99)
GLUCOSE BLD-MCNC: 114 MG/DL (ref 74–99)
GLUCOSE BLD-MCNC: 62 MG/DL (ref 74–99)
GLUCOSE BLD-MCNC: 71 MG/DL (ref 74–99)
GLUCOSE BLD-MCNC: <40 MG/DL (ref 74–99)
GLUCOSE SERPL-MCNC: 47 MG/DL (ref 74–99)
GLUCOSE UR STRIP-MCNC: NEGATIVE MG/DL
HADV DNA NPH QL NAA+NON-PROBE: NOT DETECTED
HCO3: 14.4 MMOL/L (ref 22–26)
HCOV 229E RNA NPH QL NAA+NON-PROBE: NOT DETECTED
HCOV HKU1 RNA NPH QL NAA+NON-PROBE: NOT DETECTED
HCOV NL63 RNA NPH QL NAA+NON-PROBE: NOT DETECTED
HCOV OC43 RNA NPH QL NAA+NON-PROBE: NOT DETECTED
HCT VFR BLD AUTO: 39 % (ref 34–48)
HGB BLD-MCNC: 11 G/DL (ref 11.5–15.5)
HGB UR QL STRIP.AUTO: ABNORMAL
HHB: 5.1 % (ref 0–5)
HMPV RNA NPH QL NAA+NON-PROBE: NOT DETECTED
HPIV1 RNA NPH QL NAA+NON-PROBE: NOT DETECTED
HPIV2 RNA NPH QL NAA+NON-PROBE: NOT DETECTED
HPIV3 RNA NPH QL NAA+NON-PROBE: NOT DETECTED
HPIV4 RNA NPH QL NAA+NON-PROBE: NOT DETECTED
INFLUENZA A BY PCR: NOT DETECTED
INFLUENZA B BY PCR: NOT DETECTED
KETONES UR STRIP-MCNC: NEGATIVE MG/DL
LAB: ABNORMAL
LACTATE BLDV-SCNC: 10.8 MMOL/L (ref 0.5–1.9)
LACTATE BLDV-SCNC: 9 MMOL/L (ref 0.5–2.2)
LACTATE BLDV-SCNC: 9.6 MMOL/L (ref 0.5–1.9)
LEUKOCYTE ESTERASE UR QL STRIP: ABNORMAL
LYMPHOCYTES NFR BLD: 0.39 K/UL (ref 1.5–4)
LYMPHOCYTES RELATIVE PERCENT: 13 % (ref 20–42)
Lab: 1059
M PNEUMO DNA NPH QL NAA+NON-PROBE: NOT DETECTED
MAGNESIUM SERPL-MCNC: 2 MG/DL (ref 1.6–2.6)
MCH RBC QN AUTO: 22.7 PG (ref 26–35)
MCHC RBC AUTO-ENTMCNC: 28.2 G/DL (ref 32–34.5)
MCV RBC AUTO: 80.4 FL (ref 80–99.9)
METAMYELOCYTES ABSOLUTE COUNT: 0.06 K/UL (ref 0–0.12)
METAMYELOCYTES: 2 % (ref 0–1)
METHB: 0.3 % (ref 0–1.5)
MODE: ABNORMAL
MONOCYTES NFR BLD: 0.15 K/UL (ref 0.1–0.95)
MONOCYTES NFR BLD: 5 % (ref 2–12)
NEUTROPHILS NFR BLD: 79 % (ref 43–80)
NEUTS SEG NFR BLD: 2.37 K/UL (ref 1.8–7.3)
NITRITE UR QL STRIP: POSITIVE
O2 CONTENT: 15 ML/DL
O2 SATURATION: 94.8 % (ref 92–98.5)
O2HB: 93.1 % (ref 94–97)
OPERATOR ID: 797
PATIENT TEMP: 37 C
PCO2: 27.1 MMHG (ref 35–45)
PH BLOOD GAS: 7.34 (ref 7.35–7.45)
PH UR STRIP: 6.5 [PH] (ref 5–9)
PLATELET, FLUORESCENCE: 149 K/UL (ref 130–450)
PMV BLD AUTO: ABNORMAL FL (ref 7–12)
PO2: 85.1 MMHG (ref 75–100)
POTASSIUM SERPL-SCNC: 3.5 MMOL/L (ref 3.5–5)
PROT SERPL-MCNC: 5.8 G/DL (ref 6.4–8.3)
PROT UR STRIP-MCNC: NEGATIVE MG/DL
RBC # BLD AUTO: 4.85 M/UL (ref 3.5–5.5)
RBC # BLD: ABNORMAL 10*6/UL
RBC #/AREA URNS HPF: ABNORMAL /HPF
RSV RNA NPH QL NAA+NON-PROBE: NOT DETECTED
RV+EV RNA NPH QL NAA+NON-PROBE: NOT DETECTED
SARS-COV-2 RDRP RESP QL NAA+PROBE: NOT DETECTED
SARS-COV-2 RNA NPH QL NAA+NON-PROBE: NOT DETECTED
SODIUM SERPL-SCNC: 139 MMOL/L (ref 132–146)
SOURCE, BLOOD GAS: ABNORMAL
SP GR UR STRIP: <1.005 (ref 1–1.03)
SPECIMEN DESCRIPTION: NORMAL
SPECIMEN DESCRIPTION: NORMAL
THB: 11.4 G/DL (ref 11.5–16.5)
TIME ANALYZED: 1108
TROPONIN I SERPL HS-MCNC: 115 NG/L (ref 0–9)
UROBILINOGEN UR STRIP-ACNC: 0.2 EU/DL (ref 0–1)
WBC #/AREA URNS HPF: ABNORMAL /HPF
WBC OTHER # BLD: 3 K/UL (ref 4.5–11.5)

## 2024-07-04 PROCEDURE — 71260 CT THORAX DX C+: CPT

## 2024-07-04 PROCEDURE — 6360000002 HC RX W HCPCS: Performed by: EMERGENCY MEDICINE

## 2024-07-04 PROCEDURE — 87154 CUL TYP ID BLD PTHGN 6+ TRGT: CPT

## 2024-07-04 PROCEDURE — 2580000003 HC RX 258

## 2024-07-04 PROCEDURE — 0202U NFCT DS 22 TRGT SARS-COV-2: CPT

## 2024-07-04 PROCEDURE — 84145 PROCALCITONIN (PCT): CPT

## 2024-07-04 PROCEDURE — 93005 ELECTROCARDIOGRAM TRACING: CPT | Performed by: EMERGENCY MEDICINE

## 2024-07-04 PROCEDURE — 87449 NOS EACH ORGANISM AG IA: CPT

## 2024-07-04 PROCEDURE — 2580000003 HC RX 258: Performed by: INTERNAL MEDICINE

## 2024-07-04 PROCEDURE — 6360000004 HC RX CONTRAST MEDICATION: Performed by: RADIOLOGY

## 2024-07-04 PROCEDURE — 3E030XZ INTRODUCTION OF VASOPRESSOR INTO PERIPHERAL VEIN, OPEN APPROACH: ICD-10-PCS | Performed by: FAMILY MEDICINE

## 2024-07-04 PROCEDURE — 87040 BLOOD CULTURE FOR BACTERIA: CPT

## 2024-07-04 PROCEDURE — 87324 CLOSTRIDIUM AG IA: CPT

## 2024-07-04 PROCEDURE — 87635 SARS-COV-2 COVID-19 AMP PRB: CPT

## 2024-07-04 PROCEDURE — 2000000000 HC ICU R&B

## 2024-07-04 PROCEDURE — 83605 ASSAY OF LACTIC ACID: CPT

## 2024-07-04 PROCEDURE — 74177 CT ABD & PELVIS W/CONTRAST: CPT

## 2024-07-04 PROCEDURE — 82140 ASSAY OF AMMONIA: CPT

## 2024-07-04 PROCEDURE — 71045 X-RAY EXAM CHEST 1 VIEW: CPT

## 2024-07-04 PROCEDURE — 6360000002 HC RX W HCPCS: Performed by: INTERNAL MEDICINE

## 2024-07-04 PROCEDURE — 86900 BLOOD TYPING SEROLOGIC ABO: CPT

## 2024-07-04 PROCEDURE — 96361 HYDRATE IV INFUSION ADD-ON: CPT

## 2024-07-04 PROCEDURE — 2580000003 HC RX 258: Performed by: EMERGENCY MEDICINE

## 2024-07-04 PROCEDURE — 87086 URINE CULTURE/COLONY COUNT: CPT

## 2024-07-04 PROCEDURE — 87081 CULTURE SCREEN ONLY: CPT

## 2024-07-04 PROCEDURE — 93010 ELECTROCARDIOGRAM REPORT: CPT | Performed by: INTERNAL MEDICINE

## 2024-07-04 PROCEDURE — 2500000003 HC RX 250 WO HCPCS

## 2024-07-04 PROCEDURE — 84484 ASSAY OF TROPONIN QUANT: CPT

## 2024-07-04 PROCEDURE — 81001 URINALYSIS AUTO W/SCOPE: CPT

## 2024-07-04 PROCEDURE — 70450 CT HEAD/BRAIN W/O DYE: CPT

## 2024-07-04 PROCEDURE — 82805 BLOOD GASES W/O2 SATURATION: CPT

## 2024-07-04 PROCEDURE — 85025 COMPLETE CBC W/AUTO DIFF WBC: CPT

## 2024-07-04 PROCEDURE — 87077 CULTURE AEROBIC IDENTIFY: CPT

## 2024-07-04 PROCEDURE — 82962 GLUCOSE BLOOD TEST: CPT

## 2024-07-04 PROCEDURE — 99285 EMERGENCY DEPT VISIT HI MDM: CPT

## 2024-07-04 PROCEDURE — 86850 RBC ANTIBODY SCREEN: CPT

## 2024-07-04 PROCEDURE — 96365 THER/PROPH/DIAG IV INF INIT: CPT

## 2024-07-04 PROCEDURE — 2700000000 HC OXYGEN THERAPY PER DAY

## 2024-07-04 PROCEDURE — 83735 ASSAY OF MAGNESIUM: CPT

## 2024-07-04 PROCEDURE — 86901 BLOOD TYPING SEROLOGIC RH(D): CPT

## 2024-07-04 PROCEDURE — 80053 COMPREHEN METABOLIC PANEL: CPT

## 2024-07-04 PROCEDURE — 87502 INFLUENZA DNA AMP PROBE: CPT

## 2024-07-04 RX ORDER — SODIUM CHLORIDE 9 MG/ML
INJECTION, SOLUTION INTRAVENOUS
Status: COMPLETED
Start: 2024-07-04 | End: 2024-07-04

## 2024-07-04 RX ORDER — CHOLESTYRAMINE 4 G/9G
1 POWDER, FOR SUSPENSION ORAL 2 TIMES DAILY
Status: DISCONTINUED | OUTPATIENT
Start: 2024-07-04 | End: 2024-07-05

## 2024-07-04 RX ORDER — PANTOPRAZOLE SODIUM 40 MG/1
40 TABLET, DELAYED RELEASE ORAL
Status: DISCONTINUED | OUTPATIENT
Start: 2024-07-05 | End: 2024-07-04

## 2024-07-04 RX ORDER — LEVOTHYROXINE SODIUM 0.1 MG/1
100 TABLET ORAL DAILY
Status: DISCONTINUED | OUTPATIENT
Start: 2024-07-04 | End: 2024-07-05

## 2024-07-04 RX ORDER — NOREPINEPHRINE BITARTRATE 0.06 MG/ML
1-100 INJECTION, SOLUTION INTRAVENOUS CONTINUOUS
Status: DISCONTINUED | OUTPATIENT
Start: 2024-07-04 | End: 2024-07-05

## 2024-07-04 RX ORDER — NOREPINEPHRINE BITARTRATE 0.06 MG/ML
INJECTION, SOLUTION INTRAVENOUS
Status: COMPLETED
Start: 2024-07-04 | End: 2024-07-04

## 2024-07-04 RX ORDER — DEXTROSE MONOHYDRATE AND SODIUM CHLORIDE 5; .45 G/100ML; G/100ML
INJECTION, SOLUTION INTRAVENOUS CONTINUOUS
Status: DISCONTINUED | OUTPATIENT
Start: 2024-07-04 | End: 2024-07-05

## 2024-07-04 RX ORDER — RIVASTIGMINE TARTRATE 1.5 MG/1
1.5 CAPSULE ORAL 2 TIMES DAILY
Status: DISCONTINUED | OUTPATIENT
Start: 2024-07-04 | End: 2024-07-05

## 2024-07-04 RX ORDER — PANTOPRAZOLE SODIUM 40 MG/10ML
40 INJECTION, POWDER, LYOPHILIZED, FOR SOLUTION INTRAVENOUS DAILY
Status: DISCONTINUED | OUTPATIENT
Start: 2024-07-04 | End: 2024-07-05

## 2024-07-04 RX ORDER — ONDANSETRON 2 MG/ML
4 INJECTION INTRAMUSCULAR; INTRAVENOUS EVERY 6 HOURS PRN
Status: DISCONTINUED | OUTPATIENT
Start: 2024-07-04 | End: 2024-07-05 | Stop reason: HOSPADM

## 2024-07-04 RX ORDER — MIRTAZAPINE 15 MG/1
15 TABLET, FILM COATED ORAL NIGHTLY
Status: DISCONTINUED | OUTPATIENT
Start: 2024-07-04 | End: 2024-07-05

## 2024-07-04 RX ORDER — 0.9 % SODIUM CHLORIDE 0.9 %
500 INTRAVENOUS SOLUTION INTRAVENOUS ONCE
Status: COMPLETED | OUTPATIENT
Start: 2024-07-04 | End: 2024-07-04

## 2024-07-04 RX ORDER — DIGOXIN 0.25 MG/ML
125 INJECTION INTRAMUSCULAR; INTRAVENOUS ONCE
Status: COMPLETED | OUTPATIENT
Start: 2024-07-04 | End: 2024-07-04

## 2024-07-04 RX ORDER — CALCIUM ACETATE 667 MG/1
2 CAPSULE ORAL
Status: DISCONTINUED | OUTPATIENT
Start: 2024-07-04 | End: 2024-07-05

## 2024-07-04 RX ORDER — ALBUTEROL SULFATE 2.5 MG/3ML
2.5 SOLUTION RESPIRATORY (INHALATION) EVERY 6 HOURS PRN
Status: DISCONTINUED | OUTPATIENT
Start: 2024-07-04 | End: 2024-07-05

## 2024-07-04 RX ORDER — FLUVOXAMINE MALEATE 50 MG/1
50 TABLET, COATED ORAL 2 TIMES DAILY
Status: DISCONTINUED | OUTPATIENT
Start: 2024-07-04 | End: 2024-07-05

## 2024-07-04 RX ORDER — DEXTROSE MONOHYDRATE 100 MG/ML
INJECTION, SOLUTION INTRAVENOUS CONTINUOUS PRN
Status: DISCONTINUED | OUTPATIENT
Start: 2024-07-04 | End: 2024-07-05

## 2024-07-04 RX ORDER — 0.9 % SODIUM CHLORIDE 0.9 %
1000 INTRAVENOUS SOLUTION INTRAVENOUS ONCE
Status: COMPLETED | OUTPATIENT
Start: 2024-07-04 | End: 2024-07-04

## 2024-07-04 RX ORDER — MIDODRINE HYDROCHLORIDE 5 MG/1
10 TABLET ORAL
Status: DISCONTINUED | OUTPATIENT
Start: 2024-07-05 | End: 2024-07-05

## 2024-07-04 RX ORDER — ACETAMINOPHEN 325 MG/1
650 TABLET ORAL EVERY 4 HOURS PRN
Status: DISCONTINUED | OUTPATIENT
Start: 2024-07-04 | End: 2024-07-05 | Stop reason: HOSPADM

## 2024-07-04 RX ORDER — GLUCAGON 1 MG/ML
1 KIT INJECTION PRN
Status: DISCONTINUED | OUTPATIENT
Start: 2024-07-04 | End: 2024-07-05

## 2024-07-04 RX ORDER — HEPARIN SODIUM 5000 [USP'U]/ML
5000 INJECTION, SOLUTION INTRAVENOUS; SUBCUTANEOUS EVERY 8 HOURS SCHEDULED
Status: DISCONTINUED | OUTPATIENT
Start: 2024-07-04 | End: 2024-07-05

## 2024-07-04 RX ORDER — POLYETHYLENE GLYCOL 3350 17 G/17G
17 POWDER, FOR SOLUTION ORAL DAILY PRN
Status: DISCONTINUED | OUTPATIENT
Start: 2024-07-04 | End: 2024-07-05 | Stop reason: HOSPADM

## 2024-07-04 RX ORDER — NEPHROCAP 1 MG
1 CAP ORAL
Status: DISCONTINUED | OUTPATIENT
Start: 2024-07-05 | End: 2024-07-05

## 2024-07-04 RX ORDER — BUDESONIDE 0.5 MG/2ML
500 INHALANT ORAL
Status: DISCONTINUED | OUTPATIENT
Start: 2024-07-04 | End: 2024-07-05

## 2024-07-04 RX ORDER — IPRATROPIUM BROMIDE AND ALBUTEROL SULFATE 2.5; .5 MG/3ML; MG/3ML
1 SOLUTION RESPIRATORY (INHALATION)
Status: DISCONTINUED | OUTPATIENT
Start: 2024-07-05 | End: 2024-07-05

## 2024-07-04 RX ADMIN — Medication 5 MCG/MIN: at 18:50

## 2024-07-04 RX ADMIN — PHENYLEPHRINE HYDROCHLORIDE 30 MCG/MIN: 50 INJECTION INTRAVENOUS at 20:11

## 2024-07-04 RX ADMIN — SODIUM CHLORIDE 1000 ML: 9 INJECTION, SOLUTION INTRAVENOUS at 12:01

## 2024-07-04 RX ADMIN — DEXTROSE AND SODIUM CHLORIDE: 5; 450 INJECTION, SOLUTION INTRAVENOUS at 18:59

## 2024-07-04 RX ADMIN — PIPERACILLIN AND TAZOBACTAM 4500 MG: 4; .5 INJECTION, POWDER, LYOPHILIZED, FOR SOLUTION INTRAVENOUS at 13:55

## 2024-07-04 RX ADMIN — PANTOPRAZOLE SODIUM 40 MG: 40 INJECTION, POWDER, FOR SOLUTION INTRAVENOUS at 18:42

## 2024-07-04 RX ADMIN — SODIUM CHLORIDE 5 MCG/MIN: 9 INJECTION, SOLUTION INTRAVENOUS at 18:50

## 2024-07-04 RX ADMIN — DEXTROSE MONOHYDRATE 250 ML: 100 INJECTION, SOLUTION INTRAVENOUS at 17:04

## 2024-07-04 RX ADMIN — VASOPRESSIN 0.03 UNITS/MIN: 20 INJECTION INTRAVENOUS at 23:22

## 2024-07-04 RX ADMIN — HEPARIN SODIUM 5000 UNITS: 5000 INJECTION, SOLUTION INTRAVENOUS; SUBCUTANEOUS at 22:02

## 2024-07-04 RX ADMIN — DIGOXIN 125 MCG: 0.25 INJECTION INTRAMUSCULAR; INTRAVENOUS at 15:33

## 2024-07-04 RX ADMIN — VANCOMYCIN HYDROCHLORIDE 750 MG: 5 INJECTION, POWDER, LYOPHILIZED, FOR SOLUTION INTRAVENOUS at 15:13

## 2024-07-04 RX ADMIN — DEXTROSE MONOHYDRATE 125 ML: 100 INJECTION, SOLUTION INTRAVENOUS at 18:43

## 2024-07-04 RX ADMIN — Medication 500 ML: at 15:11

## 2024-07-04 RX ADMIN — SODIUM CHLORIDE 500 ML: 9 INJECTION, SOLUTION INTRAVENOUS at 15:11

## 2024-07-04 RX ADMIN — IOPAMIDOL 75 ML: 755 INJECTION, SOLUTION INTRAVENOUS at 13:28

## 2024-07-04 ASSESSMENT — LIFESTYLE VARIABLES
HOW MANY STANDARD DRINKS CONTAINING ALCOHOL DO YOU HAVE ON A TYPICAL DAY: PATIENT DOES NOT DRINK
HOW OFTEN DO YOU HAVE A DRINK CONTAINING ALCOHOL: NEVER

## 2024-07-04 ASSESSMENT — PAIN - FUNCTIONAL ASSESSMENT: PAIN_FUNCTIONAL_ASSESSMENT: NONE - DENIES PAIN

## 2024-07-04 ASSESSMENT — ENCOUNTER SYMPTOMS
CHEST TIGHTNESS: 0
SHORTNESS OF BREATH: 0

## 2024-07-04 NOTE — PROGRESS NOTES
4 Eyes Skin Assessment     NAME:  Laurence Rodriguez  YOB: 1950  MEDICAL RECORD NUMBER:  [id number]    The patient is being assessed for  Admission    I agree that at least one RN has performed a thorough Head to Toe Skin Assessment on the patient. ALL assessment sites listed below have been assessed.      Areas assessed by both nurses:    Head, Face, Ears, Shoulders, Back, Chest, Arms, Elbows, Hands, Sacrum. Buttock, Coccyx, Ischium, Legs. Feet and Heels, and Under Medical Devices     Scabs generalized     Blanchable Redness coccyx     Heels red, boggy and blanchable     Redness on bilateral great toes     Redness to the groin     Bruising generalized on all extremities         Does the Patient have a Wound? No noted wound(s)       Олег Prevention initiated by RN: Yes  Wound Care Orders initiated by RN: No    Pressure Injury (Stage 3,4, Unstageable, DTI, NWPT, and Complex wounds) if present, place Wound referral order by RN under : No    New Ostomies, if present place, Ostomy referral order under : No     Nurse 1 eSignature: Electronically signed by Ceasar Guzamn RN on 7/4/24 at 6:52 PM EDT    **SHARE this note so that the co-signing nurse can place an eSignature**    Nurse 2 eSignature: Electronically signed by Una Morgan RN on 7/4/24 at 7:37 PM EDT

## 2024-07-04 NOTE — PROGRESS NOTES
Pharmacy Consultation Note  (Antibiotic Dosing and Monitoring)    Initial consult date: 7/4/24  Consulting physician/provider: Black  Drug: Vancomycin  Indication: Intra-abdominal    Age/  Gender Height Weight IBW  Allergy Information   74 y.o./female 157.5 cm (5' 2\") 52.2 kg (115 lb)     Ideal body weight: 50.1 kg (110 lb 7.2 oz)  Adjusted ideal body weight: 50.9 kg (112 lb 4.3 oz)   Patient has no known allergies.      Renal Function:  Recent Labs     07/04/24  1245   BUN 69*   CREATININE 5.8*     No intake or output data in the 24 hours ending 07/04/24 1800    Vancomycin Monitoring:  Trough:  No results for input(s): \"VANCOTROUGH\" in the last 72 hours.  Random:  No results for input(s): \"VANCORANDOM\" in the last 72 hours.    Vancomycin Administration Times:  Recent vancomycin administrations                     vancomycin (VANCOCIN) 750 mg in sodium chloride 0.9 % 250 mL IVPB (mg) 750 mg New Bag 07/04/24 1513                    Assessment:  Patient is a 74 y.o. female who has been initiated on vancomycin  Estimated Creatinine Clearance: 7 mL/min (A) (based on SCr of 5.8 mg/dL (H)).  To dose vancomycin, pharmacy will be utilizing dosing based off of levels due to patient requiring hemodialysis    Plan:  Received 1 time does of Vancomycin 750mg in ER  Will check vancomycin levels tomorrow AM  Will continue to monitor renal function   Pharmacy to follow      Ban Amato RPH 7/4/2024 6:00 PM    SJW: [phone]

## 2024-07-04 NOTE — ED PROVIDER NOTES
-- -- -- -- -- -- -- 52.2 kg (115 lb)   07/04/24 1006 (!) 77/45 99.5 °F (37.5 °C) Oral (!) 121 (!) 32 (!) 88 % -- --       Oxygen Saturation Interpretation: Abnormal and Improved after treatment      ------------------------------------------ PROGRESS NOTES ------------------------------------------  Re-evaluation(s):   Patient’s symptoms are improving  Repeat physical examination is improved -patient's heart rate is about the same but her oxygenation is much better, she was 88% on room air, and on about 2 L was 92 to 93%     Patient’s symptoms are improving  Repeat physical examination is improved -patient's heart rate unchanged, oxygenation now 100% on 2 to 3 L.  X-ray independently interpreted by me showing a significant pleural effusion on the left side.  CAT scan of the abdomen pelvis with my independent interpretation shows significant ascites as well.    I have spoken with the patient and sons  and discussed today’s results, in addition to providing specific details for the plan of care and counseling regarding the diagnosis and prognosis.  Their questions are answered at this time and they are agreeable with the plan.    SEP-1 CORE MEASURE DATA      Sepsis Criteria   Severe Sepsis Criteria   Septic Shock Criteria     Must be confirmed or suspected to move forward with diagnosis of sepsis.    Must meet 2:    [] Temperature > 100.9 F (38.3 C)        or < 96.8 F (36 C)  [x] HR > 90  [] RR > 20  [] WBC > 12 or < 4 or 10% bands    AND:    [x] Infection Confirmed or        Suspected.    OR:    [] Exclude from SEP-1 because:    [] No infection present or suspected  [] Does not have 2+ SIRS criteria but may have an incidental infection that requires treatment  [] May have sepsis, but does not meet criteria for severe sepsis or septic shock  [] Alternative explanation for abnormal labs and/or vitals (see MDM)  [] Viral etiology found or highly suspected (including COVID-19) without concomitant bacterial infection  examination: warm, dry    --------------------------------- ADDITIONAL PROVIDER NOTES ---------------------------------  Consultations:  Spoke with Dr. Collins,  They will admit this patient.    This patient's ED course included: a personal history and physicial examination, re-evaluation prior to disposition, multiple bedside re-evaluations, IV medications, cardiac monitoring, continuous pulse oximetry, and complex medical decision making and emergency management    This patient has remained hemodynamically stable, improved, and been closely monitored during their ED course.    Please note that the withdrawal or failure to initiate urgent interventions for this patient would likely result in a life threatening deterioration or permanent disability.      Accordingly this patient received 45 minutes of critical care time, excluding separately billable procedures. Systems at risk for deterioration include: neurologic, cardiovascular ,renal.      Clinical Impression  1. Septic shock due to urinary tract infection (HCC)    2. Acute hypoxic respiratory failure (HCC)          Disposition  [place] [facility] [place]  [place]'s condition is stable.          Matthias Robb DO  07/04/24 1433

## 2024-07-04 NOTE — H&P
0.9 03/10/2023 06:55 AM    EOSPCT PENDING 07/04/2024 10:30 AM    BASOPCT PENDING 07/04/2024 10:30 AM    MONOSABS PENDING 07/04/2024 10:30 AM    LYMPHSABS PENDING 07/04/2024 10:30 AM    EOSABS PENDING 07/04/2024 10:30 AM    BASOSABS PENDING 07/04/2024 10:30 AM     BMP:    Ordered and pending    ASSESSMENT:  Septic shock secondary to atypical pneumonia, atypical bacterial versus viral versus aspiration  Acute on chronic respiratory failure with hypoxia secondary to #1 as well as large left-sided pleural effusion  Acute on end-stage renal disease on hemodialysis with left upper extremity fistula  Acutely decompensated diastolic congestive heart failure, multifactorial  Chronic normocytic normochromic anemia  Essential hypertension with present hypotension  Hypothyroidism  Hepatic cirrhosis    PLAN:  Laurence Rodriguez is seen early in the course of ER workup for altered mental status evaluation.  Examination is very weak but nonfocal and the patient is lethargic and ill in appearance.  Meets criteria for septic shock with atypical pneumonia suspected including bacterial versus viral versus aspiration.  Also to be considered given the large pleural effusion that is unilateral is a possibility of a loculated infectious parapneumonic effusion or empyema.  Infectious workup is being undertaken.  Broad-spectrum antimicrobial therapy is being implemented.  Volume management via dialysis, may need to be called in today due to the acuity of illness and the nephrology team will provide consultation.  Depending upon response to treatment and outcome of workup patient may require ICU versus intermediate.  Much of the patient's workup is outstanding, we will be updated on results and they will be addressed accordingly.  PT, OT, speech and social work for discharge planning as she may require return to facility.  Underlying co-morbidites will be addressed during hospitalization as well. Labs and vital signs will be monitored closely and  addressed accordingly. See additional orders for details.     Greater than 40 minutes of critical care time was spent with the patient.  This time included chart review, , and discussion with those consultants involved in the patient's care.      QUINTEN Ponce CNP  11:24 AM  7/4/2024    Electronically signed by QUINTEN Ponce CNP on 7/4/24 at 11:24 AM EDT

## 2024-07-05 ENCOUNTER — APPOINTMENT (OUTPATIENT)
Dept: GENERAL RADIOLOGY | Age: 74
DRG: 871 | End: 2024-07-05
Payer: MEDICARE

## 2024-07-05 VITALS
RESPIRATION RATE: 33 BRPM | SYSTOLIC BLOOD PRESSURE: 99 MMHG | OXYGEN SATURATION: 40 % | TEMPERATURE: 99.7 F | HEIGHT: 62 IN | WEIGHT: 116.4 LBS | HEART RATE: 125 BPM | DIASTOLIC BLOOD PRESSURE: 60 MMHG | BODY MASS INDEX: 21.42 KG/M2

## 2024-07-05 PROBLEM — N39.0 SEPTIC SHOCK DUE TO URINARY TRACT INFECTION (HCC): Status: ACTIVE | Noted: 2024-07-04

## 2024-07-05 PROBLEM — J96.01 ACUTE HYPOXIC RESPIRATORY FAILURE (HCC): Status: ACTIVE | Noted: 2024-07-05

## 2024-07-05 LAB
AADO2: 459 MMHG
ALBUMIN SERPL-MCNC: 1.9 G/DL (ref 3.5–5.2)
ALP SERPL-CCNC: 63 U/L (ref 35–104)
ALT SERPL-CCNC: 27 U/L (ref 0–32)
ANION GAP SERPL CALCULATED.3IONS-SCNC: 29 MMOL/L (ref 7–16)
AST SERPL-CCNC: 95 U/L (ref 0–31)
B.E.: -24.7 MMOL/L (ref -3–3)
BASOPHILS # BLD: 0 K/UL (ref 0–0.2)
BASOPHILS NFR BLD: 0 % (ref 0–2)
BILIRUB SERPL-MCNC: 0.8 MG/DL (ref 0–1.2)
BUN SERPL-MCNC: 71 MG/DL (ref 6–23)
C DIFF GDH + TOXINS A+B STL QL IA.RAPID: POSITIVE
CALCIUM SERPL-MCNC: 8.4 MG/DL (ref 8.6–10.2)
CHLORIDE SERPL-SCNC: 93 MMOL/L (ref 98–107)
CO2 SERPL-SCNC: 13 MMOL/L (ref 22–29)
COHB: 1.1 % (ref 0–1.5)
CREAT SERPL-MCNC: 5.4 MG/DL (ref 0.5–1)
CRITICAL: ABNORMAL
DATE ANALYZED: ABNORMAL
DATE LAST DOSE: NORMAL
DATE OF COLLECTION: ABNORMAL
EKG ATRIAL RATE: 138 BPM
EKG P AXIS: 44 DEGREES
EKG P-R INTERVAL: 168 MS
EKG Q-T INTERVAL: 250 MS
EKG QRS DURATION: 68 MS
EKG QTC CALCULATION (BAZETT): 378 MS
EKG R AXIS: 27 DEGREES
EKG T AXIS: 20 DEGREES
EKG VENTRICULAR RATE: 138 BPM
EOSINOPHIL # BLD: 0.1 K/UL (ref 0.05–0.5)
EOSINOPHILS RELATIVE PERCENT: 1 % (ref 0–6)
ERYTHROCYTE [DISTWIDTH] IN BLOOD BY AUTOMATED COUNT: 17.4 % (ref 11.5–15)
FIO2: 100 %
GFR, ESTIMATED: 8 ML/MIN/1.73M2
GLUCOSE BLD-MCNC: 82 MG/DL (ref 74–99)
GLUCOSE BLD-MCNC: <40 MG/DL (ref 74–99)
GLUCOSE SERPL-MCNC: 281 MG/DL (ref 74–99)
HCO3: 8.2 MMOL/L (ref 22–26)
HCT VFR BLD AUTO: 34.5 % (ref 34–48)
HGB BLD-MCNC: 9.1 G/DL (ref 11.5–15.5)
HHB: 1.7 % (ref 0–5)
LAB: ABNORMAL
LACTATE BLDV-SCNC: 10.7 MMOL/L (ref 0.5–2.2)
LACTATE BLDV-SCNC: 13.8 MMOL/L (ref 0.5–2.2)
LYMPHOCYTES NFR BLD: 1.62 K/UL (ref 1.5–4)
LYMPHOCYTES RELATIVE PERCENT: 17 % (ref 20–42)
Lab: 315
MAGNESIUM SERPL-MCNC: 2.1 MG/DL (ref 1.6–2.6)
MCH RBC QN AUTO: 21.9 PG (ref 26–35)
MCHC RBC AUTO-ENTMCNC: 26.4 G/DL (ref 32–34.5)
MCV RBC AUTO: 82.9 FL (ref 80–99.9)
METHB: 0.3 % (ref 0–1.5)
MODE: ABNORMAL
MONOCYTES NFR BLD: 0.76 K/UL (ref 0.1–0.95)
MONOCYTES NFR BLD: 8 % (ref 2–12)
MYELOCYTES ABSOLUTE COUNT: 0.38 K/UL
MYELOCYTES: 4 %
NEUTROPHILS NFR BLD: 69 % (ref 43–80)
NEUTS SEG NFR BLD: 6.56 K/UL (ref 1.8–7.3)
NUCLEATED RED BLOOD CELLS: 7 PER 100 WBC
O2 CONTENT: 13.6 ML/DL
O2 SATURATION: 98.3 % (ref 92–98.5)
O2HB: 96.9 % (ref 94–97)
OPERATOR ID: 2323
PATIENT TEMP: 37 C
PCO2: 49.1 MMHG (ref 35–45)
PFO2: 1.8 MMHG/%
PH BLOOD GAS: 6.84 (ref 7.35–7.45)
PHOSPHATE SERPL-MCNC: 9.3 MG/DL (ref 2.5–4.5)
PLATELET # BLD AUTO: 211 K/UL (ref 130–450)
PLATELET ESTIMATE: ABNORMAL
PMV BLD AUTO: 12.6 FL (ref 7–12)
PO2: 179.9 MMHG (ref 75–100)
POTASSIUM SERPL-SCNC: 3.7 MMOL/L (ref 3.5–5)
PROCALCITONIN SERPL-MCNC: >100 NG/ML (ref 0–0.08)
PROMYELOCYTES ABSOLUTE COUNT: 0.1 K/UL
PROMYELOCYTES: 1 %
PROT SERPL-MCNC: 4.6 G/DL (ref 6.4–8.3)
RBC # BLD AUTO: 4.16 M/UL (ref 3.5–5.5)
RBC # BLD: ABNORMAL 10*6/UL
RI(T): 2.55
SODIUM SERPL-SCNC: 135 MMOL/L (ref 132–146)
SOURCE, BLOOD GAS: ABNORMAL
SPECIMEN DESCRIPTION: ABNORMAL
THB: 9.7 G/DL (ref 11.5–16.5)
TIME ANALYZED: 321
TME LAST DOSE: NORMAL H
TSH SERPL DL<=0.05 MIU/L-ACNC: 2.22 UIU/ML (ref 0.27–4.2)
VANCOMYCIN DOSE: NORMAL MG
VANCOMYCIN SERPL-MCNC: 10.2 UG/ML (ref 5–40)
WBC OTHER # BLD: 9.5 K/UL (ref 4.5–11.5)

## 2024-07-05 PROCEDURE — 5A1D70Z PERFORMANCE OF URINARY FILTRATION, INTERMITTENT, LESS THAN 6 HOURS PER DAY: ICD-10-PCS | Performed by: FAMILY MEDICINE

## 2024-07-05 PROCEDURE — 2580000003 HC RX 258: Performed by: INTERNAL MEDICINE

## 2024-07-05 PROCEDURE — 0DH67UZ INSERTION OF FEEDING DEVICE INTO STOMACH, VIA NATURAL OR ARTIFICIAL OPENING: ICD-10-PCS | Performed by: FAMILY MEDICINE

## 2024-07-05 PROCEDURE — 32555 ASPIRATE PLEURA W/ IMAGING: CPT | Performed by: INTERNAL MEDICINE

## 2024-07-05 PROCEDURE — 6370000000 HC RX 637 (ALT 250 FOR IP): Performed by: INTERNAL MEDICINE

## 2024-07-05 PROCEDURE — 71045 X-RAY EXAM CHEST 1 VIEW: CPT

## 2024-07-05 PROCEDURE — 2580000003 HC RX 258: Performed by: EMERGENCY MEDICINE

## 2024-07-05 PROCEDURE — 93010 ELECTROCARDIOGRAM REPORT: CPT | Performed by: INTERNAL MEDICINE

## 2024-07-05 PROCEDURE — 36592 COLLECT BLOOD FROM PICC: CPT

## 2024-07-05 PROCEDURE — 94640 AIRWAY INHALATION TREATMENT: CPT

## 2024-07-05 PROCEDURE — P9047 ALBUMIN (HUMAN), 25%, 50ML: HCPCS | Performed by: INTERNAL MEDICINE

## 2024-07-05 PROCEDURE — 2500000003 HC RX 250 WO HCPCS

## 2024-07-05 PROCEDURE — 2500000003 HC RX 250 WO HCPCS: Performed by: INTERNAL MEDICINE

## 2024-07-05 PROCEDURE — 0W9G3ZZ DRAINAGE OF PERITONEAL CAVITY, PERCUTANEOUS APPROACH: ICD-10-PCS | Performed by: FAMILY MEDICINE

## 2024-07-05 PROCEDURE — 0W9B3ZZ DRAINAGE OF LEFT PLEURAL CAVITY, PERCUTANEOUS APPROACH: ICD-10-PCS | Performed by: FAMILY MEDICINE

## 2024-07-05 PROCEDURE — 85025 COMPLETE CBC W/AUTO DIFF WBC: CPT

## 2024-07-05 PROCEDURE — 83605 ASSAY OF LACTIC ACID: CPT

## 2024-07-05 PROCEDURE — 6360000002 HC RX W HCPCS: Performed by: INTERNAL MEDICINE

## 2024-07-05 PROCEDURE — 83735 ASSAY OF MAGNESIUM: CPT

## 2024-07-05 PROCEDURE — 94002 VENT MGMT INPAT INIT DAY: CPT

## 2024-07-05 PROCEDURE — 6360000002 HC RX W HCPCS: Performed by: FAMILY MEDICINE

## 2024-07-05 PROCEDURE — 99292 CRITICAL CARE ADDL 30 MIN: CPT | Performed by: INTERNAL MEDICINE

## 2024-07-05 PROCEDURE — 99291 CRITICAL CARE FIRST HOUR: CPT | Performed by: INTERNAL MEDICINE

## 2024-07-05 PROCEDURE — 82805 BLOOD GASES W/O2 SATURATION: CPT

## 2024-07-05 PROCEDURE — 82962 GLUCOSE BLOOD TEST: CPT

## 2024-07-05 PROCEDURE — 84100 ASSAY OF PHOSPHORUS: CPT

## 2024-07-05 PROCEDURE — 74018 RADEX ABDOMEN 1 VIEW: CPT

## 2024-07-05 PROCEDURE — 5A1935Z RESPIRATORY VENTILATION, LESS THAN 24 CONSECUTIVE HOURS: ICD-10-PCS | Performed by: FAMILY MEDICINE

## 2024-07-05 PROCEDURE — 02HV33Z INSERTION OF INFUSION DEVICE INTO SUPERIOR VENA CAVA, PERCUTANEOUS APPROACH: ICD-10-PCS | Performed by: FAMILY MEDICINE

## 2024-07-05 PROCEDURE — 99291 CRITICAL CARE FIRST HOUR: CPT | Performed by: FAMILY MEDICINE

## 2024-07-05 PROCEDURE — 31500 INSERT EMERGENCY AIRWAY: CPT

## 2024-07-05 PROCEDURE — 49083 ABD PARACENTESIS W/IMAGING: CPT | Performed by: INTERNAL MEDICINE

## 2024-07-05 PROCEDURE — 2500000003 HC RX 250 WO HCPCS: Performed by: FAMILY MEDICINE

## 2024-07-05 PROCEDURE — 84443 ASSAY THYROID STIM HORMONE: CPT

## 2024-07-05 PROCEDURE — 80053 COMPREHEN METABOLIC PANEL: CPT

## 2024-07-05 PROCEDURE — 80202 ASSAY OF VANCOMYCIN: CPT

## 2024-07-05 PROCEDURE — 2580000003 HC RX 258: Performed by: FAMILY MEDICINE

## 2024-07-05 PROCEDURE — 36556 INSERT NON-TUNNEL CV CATH: CPT | Performed by: FAMILY MEDICINE

## 2024-07-05 PROCEDURE — 0BH17EZ INSERTION OF ENDOTRACHEAL AIRWAY INTO TRACHEA, VIA NATURAL OR ARTIFICIAL OPENING: ICD-10-PCS | Performed by: FAMILY MEDICINE

## 2024-07-05 RX ORDER — GLYCOPYRROLATE 0.2 MG/ML
0.2 INJECTION INTRAMUSCULAR; INTRAVENOUS EVERY 4 HOURS PRN
Status: DISCONTINUED | OUTPATIENT
Start: 2024-07-05 | End: 2024-07-05 | Stop reason: HOSPADM

## 2024-07-05 RX ORDER — MORPHINE SULFATE 2 MG/ML
2 INJECTION, SOLUTION INTRAMUSCULAR; INTRAVENOUS
Status: DISCONTINUED | OUTPATIENT
Start: 2024-07-05 | End: 2024-07-05 | Stop reason: HOSPADM

## 2024-07-05 RX ORDER — LORAZEPAM 2 MG/ML
1 INJECTION INTRAMUSCULAR EVERY 6 HOURS PRN
Status: DISCONTINUED | OUTPATIENT
Start: 2024-07-05 | End: 2024-07-05 | Stop reason: HOSPADM

## 2024-07-05 RX ORDER — MORPHINE SULFATE 4 MG/ML
4 INJECTION, SOLUTION INTRAMUSCULAR; INTRAVENOUS
Status: DISCONTINUED | OUTPATIENT
Start: 2024-07-05 | End: 2024-07-05 | Stop reason: HOSPADM

## 2024-07-05 RX ORDER — MINERAL OIL, PETROLATUM 425; 568 MG/G; MG/G
OINTMENT OPHTHALMIC PRN
Status: DISCONTINUED | OUTPATIENT
Start: 2024-07-05 | End: 2024-07-05 | Stop reason: HOSPADM

## 2024-07-05 RX ORDER — ALBUMIN (HUMAN) 12.5 G/50ML
50 SOLUTION INTRAVENOUS ONCE
Status: DISCONTINUED | OUTPATIENT
Start: 2024-07-05 | End: 2024-07-05

## 2024-07-05 RX ADMIN — MORPHINE SULFATE 2 MG: 2 INJECTION, SOLUTION INTRAMUSCULAR; INTRAVENOUS at 10:28

## 2024-07-05 RX ADMIN — DEXTROSE MONOHYDRATE 250 ML: 100 INJECTION, SOLUTION INTRAVENOUS at 02:26

## 2024-07-05 RX ADMIN — PIPERACILLIN AND TAZOBACTAM 3375 MG: 3; .375 INJECTION, POWDER, FOR SOLUTION INTRAVENOUS at 04:20

## 2024-07-05 RX ADMIN — MIDODRINE HYDROCHLORIDE 10 MG: 5 TABLET ORAL at 08:04

## 2024-07-05 RX ADMIN — PHENYLEPHRINE HYDROCHLORIDE 300 MCG/MIN: 50 INJECTION INTRAVENOUS at 07:52

## 2024-07-05 RX ADMIN — IPRATROPIUM BROMIDE AND ALBUTEROL SULFATE 1 DOSE: .5; 2.5 SOLUTION RESPIRATORY (INHALATION) at 05:27

## 2024-07-05 RX ADMIN — DEXTROSE AND SODIUM CHLORIDE: 5; 450 INJECTION, SOLUTION INTRAVENOUS at 07:54

## 2024-07-05 RX ADMIN — SODIUM BICARBONATE 50 MEQ: 84 INJECTION INTRAVENOUS at 03:33

## 2024-07-05 RX ADMIN — PHENYLEPHRINE HYDROCHLORIDE 175 MCG/MIN: 50 INJECTION INTRAVENOUS at 04:13

## 2024-07-05 RX ADMIN — SODIUM CHLORIDE 5 MCG/MIN: 9 INJECTION, SOLUTION INTRAVENOUS at 07:56

## 2024-07-05 RX ADMIN — SODIUM BICARBONATE 50 MEQ: 84 INJECTION INTRAVENOUS at 03:35

## 2024-07-05 RX ADMIN — VANCOMYCIN HYDROCHLORIDE 750 MG: 5 INJECTION, POWDER, LYOPHILIZED, FOR SOLUTION INTRAVENOUS at 08:26

## 2024-07-05 RX ADMIN — Medication: at 04:14

## 2024-07-05 RX ADMIN — CALCIUM ACETATE 1334 MG: 667 CAPSULE ORAL at 08:27

## 2024-07-05 RX ADMIN — BUDESONIDE 500 MCG: 0.5 SUSPENSION RESPIRATORY (INHALATION) at 05:27

## 2024-07-05 RX ADMIN — RIVASTIGMINE TARTRATE 1.5 MG: 1.5 CAPSULE ORAL at 08:07

## 2024-07-05 RX ADMIN — HEPARIN SODIUM 5000 UNITS: 5000 INJECTION, SOLUTION INTRAVENOUS; SUBCUTANEOUS at 05:55

## 2024-07-05 RX ADMIN — PANTOPRAZOLE SODIUM 40 MG: 40 INJECTION, POWDER, FOR SOLUTION INTRAVENOUS at 08:18

## 2024-07-05 RX ADMIN — FENTANYL CITRATE 25 MCG/HR: 0.05 INJECTION, SOLUTION INTRAMUSCULAR; INTRAVENOUS at 04:16

## 2024-07-05 RX ADMIN — CHOLESTYRAMINE 4 G: 4 POWDER, FOR SUSPENSION ORAL at 08:04

## 2024-07-05 RX ADMIN — GLYCOPYRROLATE 0.2 MG: 0.2 INJECTION, SOLUTION INTRAMUSCULAR; INTRAVENOUS at 10:28

## 2024-07-05 RX ADMIN — Medication 50 MEQ: at 03:33

## 2024-07-05 RX ADMIN — ALBUMIN (HUMAN) 50 G: 0.25 INJECTION, SOLUTION INTRAVENOUS at 09:37

## 2024-07-05 RX ADMIN — FLUVOXAMINE MALEATE 50 MG: 50 TABLET, COATED ORAL at 08:07

## 2024-07-05 RX ADMIN — VASOPRESSIN 0.03 UNITS/MIN: 20 INJECTION INTRAVENOUS at 09:42

## 2024-07-05 ASSESSMENT — PULMONARY FUNCTION TESTS
PIF_VALUE: 31
PIF_VALUE: 28
PIF_VALUE: 27
PIF_VALUE: 23
PIF_VALUE: 29
PIF_VALUE: 27
PIF_VALUE: 28
PIF_VALUE: 28
PIF_VALUE: 33
PIF_VALUE: 24
PIF_VALUE: 29
PIF_VALUE: 31
PIF_VALUE: 26
PIF_VALUE: 24
PIF_VALUE: 29
PIF_VALUE: 28
PIF_VALUE: 30
PIF_VALUE: 30
PIF_VALUE: 31
PIF_VALUE: 24
PIF_VALUE: 33
PIF_VALUE: 29
PIF_VALUE: 29
PIF_VALUE: 24
PIF_VALUE: 30
PIF_VALUE: 36
PIF_VALUE: 28
PIF_VALUE: 31
PIF_VALUE: 31
PIF_VALUE: 28
PIF_VALUE: 33
PIF_VALUE: 29
PIF_VALUE: 30
PIF_VALUE: 29
PIF_VALUE: 29

## 2024-07-05 NOTE — SIGNIFICANT EVENT
[facility] Hospitalist    Hospitalist RRT/Code Blue Note    Subjective:    Called to bedside at 0243 for hypoxia and hypoglycemia.  Given dextrose bolus by RN.  Pulse ox not reading well but reading in 40s.  Patient RR high teens, mottled, tachycardic.  ABG obtained, patient intubated by RRT.  ABG showed metabolic acidosis, given 2 amps bicarb and bicarb drip ordered.  Central line inserted R IJ.  CXR ordered and pending.  Son updated on patient condition.       Virt-Caps  1 capsule Oral Daily with breakfast    calcium acetate  2 capsule Oral TID WC    cholestyramine  1 packet Oral BID    fluvoxaMINE  50 mg Oral BID    levothyroxine  100 mcg Oral Daily    midodrine  10 mg Oral Once per day on Mon Wed Fri    mirtazapine  15 mg Oral Nightly    rivastigmine  1.5 mg Oral BID    piperacillin-tazobactam  3,375 mg IntraVENous Q12H    ipratropium 0.5 mg-albuterol 2.5 mg  1 Dose Inhalation Q4H WA RT    budesonide  500 mcg Nebulization BID RT    pantoprazole  40 mg IntraVENous Daily    heparin (porcine)  5,000 Units SubCUTAneous 3 times per day    vancomycin (VANCOCIN) intermittent dosing (placeholder)   Other RX Placeholder     acetaminophen, 650 mg, Q4H PRN  polyethylene glycol, 17 g, Daily PRN  albuterol, 2.5 mg, Q6H PRN  ondansetron, 4 mg, Q6H PRN  glucose, 4 tablet, PRN  dextrose bolus, 125 mL, PRN   Or  dextrose bolus, 250 mL, PRN  glucagon (rDNA), 1 mg, PRN  dextrose, , Continuous PRN         Objective:    BP 91/66   Pulse (!) 139   Temp 97.3 °F (36.3 °C) (Axillary)   Resp 19   Ht 1.575 m (5' 2\")   Wt 52.9 kg (116 lb 9.6 oz)   SpO2 (!) 83%   BMI 21.33 kg/m²     Constitutional:  Elderly, thin, unresponsive to noxious stimulus  Eyes: no scleral icterus, normal lids, no discharge  ENMT:  Normocephalic, atraumatic, mucosa moist, EOMI  Neck:  trachea midline, no JVD  Lungs:  CTA bilaterally, mechanical respirations, no retractions  Heart::  RRR, tachycardic, no murmur, rub, or gallop noted during

## 2024-07-05 NOTE — PROCEDURES
PROCEDURE NOTE  Date: 7/5/2024   Name: Laurence Rodriguez  YOB: 1950    Procedures        Late entry         Thoracentesis Procedure Note    Indication:   Pleural Effusion    Time Out:  Completed immediately prior to the start of the procedure which included verification of the correct patient, correct site and agreement on the procedure to be done.    Consent:  The indications, risks, benefits, alternatives to the procedure were explained to the patient/surrogate decision maker and their questions answered. Consent was obtained to proceed with the procedure.    Sterile Prep:  I washed/disinfected my hands prior to starting the procedure  Proceduralist(s) wore the following PPE throughout the procedure: hat, mask, sterile gown, & sterile gloves  Everyone in the room wore a mask during the procedure  A broad field sterile drape was used to cover the site  Chlorhexidine was utilized to prep the site and allowed to dry completely    Location:  Left chest    Ultrasound Guidance:  Yes    Anesthetic Used:  Lidocaine    Sterile Technique used throughout procedure?  Yes    Description/Findings:    2 L  of  Purulent   fluid drained from pleural space and sent to lab for appropriate studies.    Complications:  No apparent complications    Estimated Blood Loss (excluding fluid removed):  None    Follow up x-ray:  Ordered.     Proceduralist:   I personally performed the procedure documented as signed by this procedure note.     Assistant(s):  Not applicable    Supervision:  No supervision required    Electronically signed by Mandeep Vernon MD on 7/5/2024 at 12:34 PM

## 2024-07-05 NOTE — PROCEDURES
PROCEDURE NOTE  Date: 7/5/2024   Name: Laurence Rodriguez  YOB: 1950    Procedures        Late entry        Paracentesis Procedure Note    Indication:   Ascites    Time Out:  Completed immediately prior to the start of the procedure which included verification of the correct patient, correct site and agreement on the procedure to be done.    Consent:  The indications, risks, benefits, alternatives to the procedure were explained to the patient/surrogate decision maker and their questions answered. Consent was obtained to proceed with the procedure.    Sterile Prep:  I washed/disinfected my hands prior to starting the procedure  Proceduralist(s) wore the following PPE throughout the procedure: hat, mask, sterile gown, & sterile gloves  Everyone in the room wore a mask during the procedure  A broad field sterile drape was used to cover the site  Chlorhexidine was utilized to prep the site and allowed to dry completely    Location:  Left abdomen    Ultrasound Guidance:  Yes    Anesthetic Used:  Lidocaine    Sterile Technique used throughout procedure?  Yes    Description/Findings:    1500 ml of straw colored fluid drained from peritoneal space and sent to lab for appropriate studies.    Complications:  No apparent complications    Estimated Blood Loss (excluding fluid removed):  None    Proceduralist:   I personally performed the procedure documented as signed by this procedure note.     Assistant(s):  Not applicable    Supervision:  No supervision required    Electronically signed by Mandeep Vernon MD on 7/5/2024 at 12:36 PM

## 2024-07-05 NOTE — CONSULTS
PULMONARY CRITICAL CARE CONSULTATION NOTE.    7/5/2024    CONSULTING  PHYSICIAN:    REASON FOR REFERRAL: Septic shock    History of Present Illness    Ms. Laurence Rodriguez  is a 74 y.o. female who was initially brought to the emergency room with altered mental status.  Patient was recently discharged from a skilled facility about 3 days ago and has been living at home since then.  She has had increasing lethargic, confusion and not being able to communicate as well as difficulty swallowing over the last several days.  In the emergency room she was found to have a complete whiteout of the left hemithorax with massive amount of left pleural effusion.  She also had ascites, she is a known cirrhotic.      Baseline Exercise tolerance/MMRC score( Bold )       Smoking history- Never smoker    Occupational exposure/ Pet/bird -  No history of exposure to any occupational Pneumotoxins.     Age appropriate Cancer screening-   Patient is up to date on age appropriate cancer screening and immunizations.        WCE is good.     Active Ambulatory Problems     Diagnosis Date Noted    End stage renal disease (HCC) 01/23/2023    Encounter regarding vascular access for dialysis for end-stage renal disease (HCC) 01/23/2023    Acute renal failure (ARF) (HCC) 03/08/2023     Resolved Ambulatory Problems     Diagnosis Date Noted    No Resolved Ambulatory Problems     Past Medical History:   Diagnosis Date    Anemia     Cirrhosis of liver (HCC)     COVID-19     Depression     Hemodialysis patient (HCC)     History of blood transfusion     Hypertension     Iron deficiency anemia, unspecified     Kidney failure     Thyroid disease        Past Surgical History:   Procedure Laterality Date    CATHETER REMOVAL Right 12/5/2023    removal of tunneled hemodialysis catheter/ FACILITY performed by Quin Chavez [place] [facility] [place]    CT BIOPSY RENAL  11/10/2022    CT BIOPSY RENAL 11/10/2022 Juan Swenson [place] [facility] [place]    DIALYSIS FISTULA  07/05/24  0435   TSH 2.22          Radiology:    chest X-ray  (personally reviewed)      PFT's  Pulmonary Functions Testing Results:  None  available     Assessment / Plan-     Multiorgan dysfunction syndrome -   Encephalopathy  Congestive heart failure  Acute respiratory failure  Acute on chronic kidney failure, ESRD  Acute on chronic hepatic failure  Hematopoietic dysfunction  Coagulopathy    Multiple irreversible organ dysfunction and advanced biologic  age. It is in her best interest to institute comfort care measures , will discuss with family.     Palliative care consult .      Detailed discussion was done with the family of the patient which involved 2 sons, daughter-in-law and siblings.  Goals of care were discussed, he has a poor prognosis.  Family is waiting for other family members to arrive to make her comfortable (comfort care measures)      Critical care time spent except separately billable procedures = 78 mins.        Mandeep Vernon MD

## 2024-07-05 NOTE — CODE DOCUMENTATION
Dr. Watts preparing to place central line.  Consent obtained by pt's son via telephone verified by 2 RN's.

## 2024-07-05 NOTE — PROGRESS NOTES
Physical Therapy      Room #:    IC02/IC02-01  Date: 2024       Patient Name: Laurence Rodriguez  : 1950      MRN: [id number]    P.T. on hold due to intubation this am; please reorder PT EVAL AND TREAT when patient able to participate.    Thank you. Herson Chandler, PT

## 2024-07-05 NOTE — DISCHARGE SUMMARY
Internal Medicine Progress Note     [facility]     Rj Wayne D.O., ISHMAELOSAMM Collins D.O., ISHMAELOSAMM Yan D.O.     Chantal Bain, MSN, APRN, NP-C  Steven Gruber, MSN, APRN-CNP  Pedro Marin, MSN, APRN, NP-C  Marychuy Cobian, MSN, APRN-CNP  Nissa Rodriguez, MSN, APRN, NP-C       Internal Medicine  Discharge Summary    NAME: Laurence Rodriguez  :  1950  MRN:  [id number]  PCP:Eufemia Garcia MD  ADMITTED: 2024      DISCHARGED: 24    ADMITTING PHYSICIAN: Balwinder Collins DO    CONSULTANT(S):   IP CONSULT TO NEPHROLOGY  IP CONSULT TO INFECTIOUS DISEASES  IP CONSULT TO PHARMACY     ADMITTING DIAGNOSIS:   Sepsis secondary to UTI (HCC) [A41.9, N39.0]  Septic shock due to urinary tract infection (HCC) [A41.9, R65.21, N39.0]  Acute hypoxic respiratory failure (HCC) [J96.01]     DISCHARGE DIAGNOSES:   Patient was pronounced  at 1109 on 2024  Septic shock secondary to atypical pneumonia, atypical bacterial versus viral versus aspiration along with E. coli bacteremia  Acute on chronic respiratory failure with hypoxia secondary to #1 as well as large left-sided pleural effusion  Acute on end-stage renal disease on hemodialysis with left upper extremity fistula  Acutely decompensated diastolic congestive heart failure, multifactorial  Chronic normocytic normochromic anemia  Essential hypertension with present hypotension  Hypothyroidism  Hepatic cirrhosis    BRIEF HISTORY OF PRESENT ILLNESS:   Laurence Rodriguez is a 74-year-old female patient who presents to the emergency department for evaluation of altered mental status.  The patient's son is present at bedside and provides much of the history.  Apparently she was discharged from skilled nursing facility 2 or 3 days ago.  She has health care aides in the morning and in the evening and he provides assistance for the patient as well.  She developed increased lethargy, confusion, and

## 2024-07-05 NOTE — CARE COORDINATION
Case Management Assessment  Initial Evaluation    Date/Time of Evaluation: 7/5/2024 11:04 AM  Assessment Completed by: Giovanna Bernabe RN    If patient is discharged prior to next notation, then this note serves as note for discharge by case management.    Patient Name: Laurence Rodriguez                   YOB: 1950  Diagnosis: Sepsis secondary to UTI (HCC) [A41.9, N39.0]  Septic shock due to urinary tract infection (HCC) [A41.9, R65.21, N39.0]  Acute hypoxic respiratory failure (HCC) [J96.01]                   Date / Time: 7/4/2024 10:03 AM    Patient Admission Status: Inpatient   Readmission Risk (Low < 19, Mod (19-27), High > 27): Readmission Risk Score: 17.9    Current PCP: Eufemia Garcia MD  PCP verified by CM? Yes    Chart Reviewed: Yes      History Provided by: Child/Family, Medical Record  Patient Orientation: Other (see comment) (end of life- extubated- life banc ruled out)    Patient Cognition: Other (see comment) (extubated- end of life)    Hospitalization in the last 30 days (Readmission):  No      Advance Directives:      Code Status: DNR-CC   Patient's Primary Decision Maker is: Named in Scanned ACP Document    Primary Decision Maker: Pedro Rodriguez - Child - [phone]    Secondary Decision Maker: Massimo Sosa - [phone]    Supplemental (Other) Decision Maker: Tavo Rodriguez - Child - [phone]    Discharge Planning:    Patient lives with: Children Type of Home: House  Primary Care Giver: Other (Comment) (unclear- pt end of life)  Patient Support Systems include: Children, Family Members           Type of Home Care services:  None    ADLS  Prior functional level: Other (see comment) (end of life- was extubated)  Current functional level: Other (see comment) (end of life)    Family can provide assistance at DC: No  Would you like Case Management to discuss the discharge plan with any other family members/significant others, and if so, who? Yes (dpoa-hc documents in epic  noted)  Plans to Return to Present Housing: Other (see comment) (end of life)  Other Identified Issues/Barriers to RETURNING to current housing: end of life  Potential Assistance needed at discharge: N/A            Potential DME:  end of life  Patient expects to discharge to: House  Plan for transportation at discharge:  end of life    Financial    Payor: MEDICARE / Plan: MEDICARE PART A / Product Type: *No Product type* /     [id number] - [place], [place] - [address] - P [phone] - F [phone]  [address]  [place] [address]  Phone: [phone] Fax: [phone]    [facility] [place] - [address] [place] [address] - P [phone] - F [phone]  [address] [facility] [place] [address]  Phone: [phone] Fax: [phone]    End of life: extubated. Life bank ruled out. CM following.       Giovanna Bernabe RN-BC   Case Management Department  Ph:[phone]

## 2024-07-05 NOTE — PROGRESS NOTES
Speech Language Pathology  NAME:  Laurence Rodriguez  :  1950  DATE: 2024  ROOM:  [facility]/IC02-01    Pt unavailable for Clinical Swallow Evaluation     REASON:  Patient intubated prior to evaluation being completed please reorder evaluation when medically appropriate.        Thank You    Macarena Vega MSCCC/SLP  Speech Language Pathologist  [phone]

## 2024-07-05 NOTE — PLAN OF CARE
Jelly Dietz  [address]  [address]  [facility] [address]                December 30, 2022    This letter is regarding your transplant lab work. The most recent laboratory results we have on file for you are from June 2022. Your labs should be completed as soon as possible.   For the best outcome for your transplant and in order for us to refill your prescriptions, we encourage you to have a yearly clinic appointment with a physician and to have current labs. If you are unable to return to our transplant center there are several alternatives. Please call your coordinator to discuss them.  To make an appointment with a physician here at [facility], please call:  The Transplant Office at [phone] or [phone].     The Transplant Staff at [facility]           Problem: Discharge Planning  Goal: Discharge to home or other facility with appropriate resources  Outcome: Not Progressing  Flowsheets (Taken 7/4/2024 2105 by Ulysses Casas, RN)  Discharge to home or other facility with appropriate resources:   Identify barriers to discharge with patient and caregiver   Arrange for needed discharge resources and transportation as appropriate   Identify discharge learning needs (meds, wound care, etc)   Refer to discharge planning if patient needs post-hospital services based on physician order or complex needs related to functional status, cognitive ability or social support system     Problem: Safety - Adult  Goal: Free from fall injury  Outcome: Progressing     Problem: Skin/Tissue Integrity  Goal: Absence of new skin breakdown  Description: 1.  Monitor for areas of redness and/or skin breakdown  2.  Assess vascular access sites hourly  3.  Every 4-6 hours minimum:  Change oxygen saturation probe site  4.  Every 4-6 hours:  If on nasal continuous positive airway pressure, respiratory therapy assess nares and determine need for appliance change or resting period.  Outcome: Progressing     Problem: Discharge Planning  Goal: Discharge to home or other facility with appropriate resources  Outcome: Not Progressing  Flowsheets (Taken 7/4/2024 2105 by Ulysses Casas, RN)  Discharge to home or other facility with appropriate resources:   Identify barriers to discharge with patient and caregiver   Arrange for needed discharge resources and transportation as appropriate   Identify discharge learning needs (meds, wound care, etc)   Refer to discharge planning if patient needs post-hospital services based on physician order or complex needs related to functional status, cognitive ability or social support system

## 2024-07-05 NOTE — PROGRESS NOTES
This nurse contacted Dr. Tanner about critical labs:  Phosphorus: 9.3  Lactic acid: 13.8    No new orders were given and was told Dr. Vernon would handle replacements and orders during morning rounds.

## 2024-07-05 NOTE — ACP (ADVANCE CARE PLANNING)
Advance Care Planning   Healthcare Decision Maker:    Primary Decision Maker: Pedro Rodriguez - Child - [phone]    Secondary Decision Maker: Massimo Sosa - [phone]    Supplemental (Other) Decision Maker: Tavo Rodriguez - Kailyn - [phone]        Today we documented Decision Maker(s) consistent with ACP documents on file.         Electronically signed by Giovanna Bernabe RN-BC on 7/5/2024 at 11:01 AM

## 2024-07-05 NOTE — SIGNIFICANT EVENT
Death Pronouncement    Laurence Rodriguez was pronounced  on 2024   at 1109 hrs., Eastern standard time on the fifth day     Assessment:    Electrical silence noted in the cardiac monitor   There were no heart tones present.  There were no breath sounds or spontaneous respirations.  There was no palpable carotid pulse.  The pupils were fixed and dilated.  There was no response to noxious stimuli.  Absent corneal reflex  Absent conjunctival reflex   Absent Gag reflex  Absent cough reflex    Physical examination findings are  consistent with death.       Notification:  The next of kin was present.        Electronically signed by Mandeep Vernon MD   2024  11:53 AM

## 2024-07-05 NOTE — PROCEDURES
PROCEDURE NOTE  Date: 7/5/2024   Name: Laurence Rodriguez  YOB: 1950    Procedures    Central Line Insertion      Procedure: right internal jugular vein Triple Lumen Catheter placement.     Indications: vascular access; decompensated and hypotensive requiring pressors      Consent: Unable to be obtained due to the emergent nature of this procedure.     Number of sticks: 1     Number of Kits used: 1     Procedure: Time Out: Immediately prior to the procedure a \"timeout\" was called to verify the correct patient and procedure. The patient was place in the trendelenburg position and the skin over the right internal jugular vein was prepped with chlorhexidine and draped in a sterile fashion. Local anesthesia was not performed due to the emergent nature of this procedure.  With Ultrasound guidance a large bore needle was used to identify the vein, dark non pulsatile blood returned. The guide wire was then inserted through the needle with minimal resistance. 2 mm nick was made in the skin beside the guidewire. Then a dilator was inserted and removed. A triple lumen catheter was then inserted into the vessel over the guide wire using the Seldinger technique to the 15 cm sharon.  All ports showed good, free flowing blood return and were flushed with saline solution.  The catheter was then securely fastened to the skin with sutures and with an adhesive dressing and covered with a bio patch and sterile dressing.  A post procedure X-ray was ordered and is still pending at this time.     Complications: None   The patient tolerated the procedure well.     Estimated blood loss: 5 ml.

## 2024-07-05 NOTE — CONSULTS
Consult deferred      Patient was doing poorly.  She was on multiple pressors.  Because of shock SLEDD was ordered.  But the family decided about de-escalating the care and made the patient DNR CC comfort care only.  No aggressive intervention like dialysis.  In the situation we will defer the consult.          Ramy Lojaith, DO  , Thank You for allowing me to participate in the care of this patient.  Will follow the patient with you.    Preston Montiel MD  Nephrology    Electronically signed by Preston Montiel MD on 7/5/2024 at 12:00 PM

## 2024-07-05 NOTE — PROGRESS NOTES
Pharmacy Consultation Note  (Antibiotic Dosing and Monitoring)    Initial consult date: 7/4/24  Consulting physician/provider: Black  Drug: Vancomycin  Indication: Intra-abdominal    Age/  Gender Height Weight IBW  Allergy Information   74 y.o./female 157.5 cm (5' 2\") 52.2 kg (115 lb)     Ideal body weight: 50.1 kg (110 lb 7.2 oz)  Adjusted ideal body weight: 51.2 kg (112 lb 13.3 oz)   Patient has no known allergies.      Renal Function:  Recent Labs     07/04/24  1245 07/05/24  0435   BUN 69* 71*   CREATININE 5.8* 5.4*         Intake/Output Summary (Last 24 hours) at 7/5/2024 0759  Last data filed at 7/4/2024 1933  Gross per 24 hour   Intake 420.25 ml   Output --   Net 420.25 ml       Vancomycin Monitoring:  Trough:  No results for input(s): \"VANCOTROUGH\" in the last 72 hours.  Random:    Recent Labs     07/05/24  0435   VANCORANDOM 10.2       Vancomycin Administration Times:  Recent vancomycin administrations                     vancomycin (VANCOCIN) 750 mg in sodium chloride 0.9 % 250 mL IVPB (mg) 750 mg New Bag 07/04/24 1513                    Assessment:  Patient is a 74 y.o. female who has been initiated on vancomycin  Estimated Creatinine Clearance: 7 mL/min (A) (based on SCr of 5.4 mg/dL (H)).  To dose vancomycin, pharmacy will be utilizing dosing based off of levels due to patient requiring hemodialysis  7/5: Random level @ 0435=10.2 mcg/mL      Plan:   Vancomycin 750mg x1 now, follow for nephrology dialysis plan.  Check vancomycin levels when appropriate  Will continue to monitor renal function   Pharmacy to follow      Yoselin Carolina RPH 7/5/2024 7:59 AM  SJW: [phone]

## 2024-07-05 NOTE — PROGRESS NOTES
Notified [facility] of patient change in code status and plans for withdrawal. Per [facility], patient is not a candidate at this time. Please call back with cardiac time of death.     Electronically signed by Heidy Burns RN on 7/5/2024 at 10:31 AM

## 2024-07-06 LAB
MICROORGANISM SPEC CULT: ABNORMAL
MICROORGANISM SPEC CULT: NORMAL
MICROORGANISM/AGENT SPEC: ABNORMAL
SERVICE CMNT-IMP: ABNORMAL
SPECIMEN DESCRIPTION: ABNORMAL
SPECIMEN DESCRIPTION: NORMAL

## 2024-07-07 LAB
ACB COMPLEX DNA BLD POS QL NAA+NON-PROBE: NOT DETECTED
B FRAGILIS DNA BLD POS QL NAA+NON-PROBE: NOT DETECTED
BIOFIRE TEST COMMENT: ABNORMAL
BLACTX-M ISLT/SPM QL: NOT DETECTED
BLAIMP ISLT/SPM QL: NOT DETECTED
BLAKPC ISLT/SPM QL: NOT DETECTED
BLAOXA-48-LIKE ISLT/SPM QL: NOT DETECTED
BLAVIM ISLT/SPM QL: NOT DETECTED
C ALBICANS DNA BLD POS QL NAA+NON-PROBE: NOT DETECTED
C AURIS DNA BLD POS QL NAA+NON-PROBE: NOT DETECTED
C GATTII+NEOFOR DNA BLD POS QL NAA+N-PRB: NOT DETECTED
C GLABRATA DNA BLD POS QL NAA+NON-PROBE: NOT DETECTED
C KRUSEI DNA BLD POS QL NAA+NON-PROBE: NOT DETECTED
C PARAP DNA BLD POS QL NAA+NON-PROBE: NOT DETECTED
C TROPICLS DNA BLD POS QL NAA+NON-PROBE: NOT DETECTED
COLISTIN RES MCR-1 ISLT/SPM QL: NOT DETECTED
E CLOAC COMP DNA BLD POS NAA+NON-PROBE: NOT DETECTED
E COLI DNA BLD POS QL NAA+NON-PROBE: DETECTED
E FAECALIS DNA BLD POS QL NAA+NON-PROBE: NOT DETECTED
E FAECIUM DNA BLD POS QL NAA+NON-PROBE: NOT DETECTED
ENTEROBACTERALES DNA BLD POS NAA+N-PRB: DETECTED
GP B STREP DNA BLD POS QL NAA+NON-PROBE: NOT DETECTED
HAEM INFLU DNA BLD POS QL NAA+NON-PROBE: NOT DETECTED
K OXYTOCA DNA BLD POS QL NAA+NON-PROBE: NOT DETECTED
KLEBSIELLA SP DNA BLD POS QL NAA+NON-PRB: NOT DETECTED
KLEBSIELLA SP DNA BLD POS QL NAA+NON-PRB: NOT DETECTED
L MONOCYTOG DNA BLD POS QL NAA+NON-PROBE: NOT DETECTED
MICROORGANISM SPEC CULT: ABNORMAL
MICROORGANISM/AGENT SPEC: ABNORMAL
N MEN DNA BLD POS QL NAA+NON-PROBE: NOT DETECTED
P AERUGINOSA DNA BLD POS NAA+NON-PROBE: NOT DETECTED
PROTEUS SP DNA BLD POS QL NAA+NON-PROBE: NOT DETECTED
RESISTANT GENE NDM BY PCR: NOT DETECTED
S AUREUS DNA BLD POS QL NAA+NON-PROBE: NOT DETECTED
S AUREUS+CONS DNA BLD POS NAA+NON-PROBE: NOT DETECTED
S EPIDERMIDIS DNA BLD POS QL NAA+NON-PRB: NOT DETECTED
S LUGDUNENSIS DNA BLD POS QL NAA+NON-PRB: NOT DETECTED
S MALTOPHILIA DNA BLD POS QL NAA+NON-PRB: NOT DETECTED
S MARCESCENS DNA BLD POS NAA+NON-PROBE: NOT DETECTED
S PNEUM DNA BLD POS QL NAA+NON-PROBE: NOT DETECTED
S PYO DNA BLD POS QL NAA+NON-PROBE: NOT DETECTED
SALMONELLA DNA BLD POS QL NAA+NON-PROBE: NOT DETECTED
SERVICE CMNT-IMP: ABNORMAL
SERVICE CMNT-IMP: ABNORMAL
SPECIMEN DESCRIPTION: ABNORMAL
SPECIMEN DESCRIPTION: ABNORMAL
STREPTOCOCCUS DNA BLD POS NAA+NON-PROBE: NOT DETECTED

## (undated) DEVICE — SYRINGE MED 10ML LUERLOCK TIP W/O SFTY DISP

## (undated) DEVICE — LOOP VES W13MM THK09MM MINI RED SIL FLD REPELLENT

## (undated) DEVICE — SYSTEM CATH 20GA L1IN OD1.0668-1.143MM ID0.7874-0.8636MM

## (undated) DEVICE — GAUZE,SPONGE,4"X4",16PLY,XRAY,STRL,LF: Brand: MEDLINE

## (undated) DEVICE — ADHESIVE SKIN CLSR 0.7ML TOP DERMBND ADV

## (undated) DEVICE — STOCKINETTE,DOUBLE PLY,6X48,STERILE: Brand: MEDLINE

## (undated) DEVICE — AV FISTULA: Brand: MEDLINE INDUSTRIES, INC.

## (undated) DEVICE — LUBRICANT SURG JELLY ST BACTER TUBE 4.25OZ

## (undated) DEVICE — GLOVE ORANGE PI 7 1/2   MSG9075

## (undated) DEVICE — SPONGE GZ 4IN 4IN 4 PLY N WVN AVANT

## (undated) DEVICE — SOLUTION IV IRRIG 1000ML POUR BTL 2F7114

## (undated) DEVICE — CLOTH SURG PREP PREOPERATIVE CHLORHEXIDINE GLUC 2% READYPREP

## (undated) DEVICE — GLOVE ORANGE PI 7   MSG9070

## (undated) DEVICE — PENCIL,CAUTERY,ROCKER,PTFE,15'CORD: Brand: MEDLINE INDUSTRIES, INC.

## (undated) DEVICE — STOCKINETTE,SINGLE PLY,4X48,STERILE: Brand: MEDLINE

## (undated) DEVICE — SLING ARM XL L20IN D75IN WHT POLY MESH ENVELOP MTL SIDE

## (undated) DEVICE — GOWN,SIRUS,FABRNF,L,20/CS: Brand: MEDLINE

## (undated) DEVICE — TESIO: Brand: MEDLINE INDUSTRIES, INC.

## (undated) DEVICE — SOLUTION IRRIG 500ML 0.9% SOD CHLO USP POUR PLAS BTL

## (undated) DEVICE — 6 X 9  1.75MIL 4-WALL LABGUARD: Brand: MINIGRIP COMMERCIAL LLC

## (undated) DEVICE — AGENT HEMSTAT W2XL4IN OXIDIZED REGENERATED CELOS ABSRB

## (undated) DEVICE — ADAPTER CLEANING PORPOISE CLEANING

## (undated) DEVICE — KIT BEDSIDE REVITAL OX 500ML

## (undated) DEVICE — TUBING, SUCTION, 1/4" X 10', STRAIGHT: Brand: MEDLINE

## (undated) DEVICE — CATHETER ETER IV 20GA L1IN POLYUR STR RADPQ INTROCAN SFTY

## (undated) DEVICE — SLING ARM M L1375IN D75IN WHT POLY MESH ENVELOP MTL SIDE

## (undated) DEVICE — GLOVE SURG SZ 7.5 L11.73IN FNGR THK9.8MIL STRW LTX POLYMER

## (undated) DEVICE — BLOCK BITE 60FR CAREGUARD

## (undated) DEVICE — SOLUTION IRRIG 1000ML 0.9% SOD CHL USP POUR PLAS BTL

## (undated) DEVICE — GLOVE ORANGE PI 8   MSG9080

## (undated) DEVICE — FORCEPS BX L240CM JAW DIA2.8MM L CAP W/ NDL MIC MESH TOOTH

## (undated) DEVICE — SLING ARM L L165IN D75IN WHT POLY MESH ENVELOP MTL SIDE

## (undated) DEVICE — CLAMP INSERT: Brand: STEALTH® CLAMP INSERT

## (undated) DEVICE — MASK,FACE,MAXFLUIDPROTECT,SHIELD/ERLPS: Brand: MEDLINE

## (undated) DEVICE — 3M™ TEGADERM™ TRANSPARENT FILM DRESSING FRAME STYLE, 1626W, 4 IN X 4-3/4 IN (10 CM X 12 CM), 50/CT 4CT/CASE: Brand: 3M™ TEGADERM™

## (undated) DEVICE — BLADE,STAINLESS-STEEL,15,STRL,DISPOSABLE: Brand: MEDLINE

## (undated) DEVICE — CONTAINER SPEC COLL 960ML POLYPR TRIANG GRAD INTAKE/OUTPUT

## (undated) DEVICE — GAUZE,SPONGE,AVANT,4"X4",4PLY,STRL,10/TR: Brand: MEDLINE

## (undated) DEVICE — KENDALL 450 SERIES MONITORING FOAM ELECTRODE - RECTANGULAR SHAPE ( 3/PK): Brand: KENDALL

## (undated) DEVICE — SYSTEM CATH 20GA L1IN OD1.0668-1.143MM ID0.7874-0.8636MM 383516

## (undated) DEVICE — GOWN ISOLATN REG YEL M WT MULTIPLY SIDETIE LEV 2

## (undated) DEVICE — Device

## (undated) DEVICE — GEL US 20GM NONIRRITATING OVERWRAPPED FILE PCH TRNSMIT

## (undated) DEVICE — WIPES SKIN CLOTH READYPREP 9 X 10.5 IN 2% CHLORHEX GLUCONATE CHG PREOP

## (undated) DEVICE — 18 GA N.G. KIT, 10 PACK: Brand: SITE-RITE

## (undated) DEVICE — ELECTRODE PT RET AD L9FT HI MOIST COND ADH HYDRGEL CORDED

## (undated) DEVICE — YANKAUER,BULB TIP,W/O VENT,RIGID,STERILE: Brand: MEDLINE

## (undated) DEVICE — GARMENT,MEDLINE,DVT,INT,CALF,MED, GEN2: Brand: MEDLINE

## (undated) DEVICE — Device: Brand: DEFENDO VALVE AND CONNECTOR KIT